# Patient Record
Sex: FEMALE | Race: BLACK OR AFRICAN AMERICAN | NOT HISPANIC OR LATINO | URBAN - METROPOLITAN AREA
[De-identification: names, ages, dates, MRNs, and addresses within clinical notes are randomized per-mention and may not be internally consistent; named-entity substitution may affect disease eponyms.]

---

## 2017-05-08 ENCOUNTER — OFFICE VISIT (OUTPATIENT)
Dept: INTERNAL MEDICINE | Facility: CLINIC | Age: 52
End: 2017-05-08
Payer: COMMERCIAL

## 2017-05-08 VITALS
DIASTOLIC BLOOD PRESSURE: 70 MMHG | HEART RATE: 76 BPM | WEIGHT: 186.5 LBS | HEIGHT: 64 IN | OXYGEN SATURATION: 99 % | SYSTOLIC BLOOD PRESSURE: 132 MMHG | BODY MASS INDEX: 31.84 KG/M2

## 2017-05-08 DIAGNOSIS — E78.2 MIXED HYPERLIPIDEMIA: ICD-10-CM

## 2017-05-08 DIAGNOSIS — Z00.00 VISIT FOR ANNUAL HEALTH EXAMINATION: Primary | ICD-10-CM

## 2017-05-08 DIAGNOSIS — I10 BENIGN ESSENTIAL HTN: ICD-10-CM

## 2017-05-08 DIAGNOSIS — M17.0 PRIMARY OSTEOARTHRITIS OF KNEES, BILATERAL: ICD-10-CM

## 2017-05-08 DIAGNOSIS — Z12.11 SCREEN FOR COLON CANCER: ICD-10-CM

## 2017-05-08 DIAGNOSIS — G56.03 BILATERAL CARPAL TUNNEL SYNDROME: ICD-10-CM

## 2017-05-08 PROCEDURE — 1160F RVW MEDS BY RX/DR IN RCRD: CPT | Mod: S$GLB,,, | Performed by: INTERNAL MEDICINE

## 2017-05-08 PROCEDURE — 99999 PR PBB SHADOW E&M-NEW PATIENT-LVL IV: CPT | Mod: PBBFAC,,, | Performed by: INTERNAL MEDICINE

## 2017-05-08 PROCEDURE — 3075F SYST BP GE 130 - 139MM HG: CPT | Mod: S$GLB,,, | Performed by: INTERNAL MEDICINE

## 2017-05-08 PROCEDURE — 99203 OFFICE O/P NEW LOW 30 MIN: CPT | Mod: S$GLB,,, | Performed by: INTERNAL MEDICINE

## 2017-05-08 PROCEDURE — 3078F DIAST BP <80 MM HG: CPT | Mod: S$GLB,,, | Performed by: INTERNAL MEDICINE

## 2017-05-08 RX ORDER — HYDROCHLOROTHIAZIDE 25 MG/1
25 TABLET ORAL DAILY
Qty: 90 TABLET | Refills: 2 | Status: SHIPPED | OUTPATIENT
Start: 2017-05-08 | End: 2018-06-19 | Stop reason: SDUPTHER

## 2017-05-08 RX ORDER — NORGESTIMATE AND ETHINYL ESTRADIOL 0.25-0.035
1 KIT ORAL DAILY
Qty: 30 TABLET | Refills: 0
Start: 2017-05-08 | End: 2018-07-31

## 2017-05-08 RX ORDER — HYDROCHLOROTHIAZIDE 25 MG/1
25 TABLET ORAL DAILY
Qty: 90 TABLET | Refills: 1
Start: 2017-05-08 | End: 2017-05-08 | Stop reason: SDUPTHER

## 2017-05-08 RX ORDER — FLUTICASONE PROPIONATE 50 MCG
1 SPRAY, SUSPENSION (ML) NASAL DAILY
Qty: 1 BOTTLE | Refills: 0
Start: 2017-05-08 | End: 2018-04-06 | Stop reason: SDUPTHER

## 2017-05-08 NOTE — MR AVS SNAPSHOT
[facility]  [address]  [facility] [address]  Phone: [phone]  Fax: [phone]                  Leah Minaya   2017 9:20 AM   Office Visit    Description:  Female : 1965   Provider:  Didi Bermudez MD   Department:  [facility]           Reason for Visit     Establish Care           Diagnoses this Visit        Comments    Visit for annual health examination    -  Primary     Benign essential HTN         Primary osteoarthritis of knees, bilateral         Bilateral carpal tunnel syndrome         Mixed hyperlipidemia         Screen for colon cancer                To Do List           Future Appointments        Provider Department Dept Phone    2017 10:20 AM LAB, APPOINTMENT [facility] [phone]      Goals (5 Years of Data)     None       These Medications        Disp Refills Start End    hydrochlorothiazide (HYDRODIURIL) 25 MG tablet 90 tablet 2 2017    Take 1 tablet (25 mg total) by mouth once daily. - Oral    Pharmacy: [facility] [address] [place] [address] #: [phone]         [facility]     [facility] Care Line -  Assistance  Unless otherwise directed by your provider, please contact [facility], our nurse care line that is available for  assistance.     Registered nurses in the [facility] provide: appointment scheduling, clinical advisement, health education, and other advisory services.  Call: [phone] (toll free)               Medications           Message regarding Medications     Verify the changes and/or additions to your medication regime listed below are the same as discussed with your clinician today.  If any of these changes or additions are incorrect, please notify your healthcare provider.             Verify that the below list of medications is an accurate representation of the medications you are  "currently taking.  If none reported, the list may be blank. If incorrect, please contact your healthcare provider. Carry this list with you in case of emergency.           Current Medications     fluticasone (FLONASE) 50 mcg/actuation nasal spray 1 spray by Each Nare route once daily.    hydrochlorothiazide (HYDRODIURIL) 25 MG tablet Take 1 tablet (25 mg total) by mouth once daily.    norgestimate-ethinyl estradiol (ORTHO-CYCLEN) 0.25-35 mg-mcg per tablet Take 1 tablet by mouth once daily.           Clinical Reference Information           Your Vitals Were     BP Pulse Height Weight SpO2 BMI    132/70 (BP Location: Right arm, Patient Position: Sitting) 76 5' 4" (1.626 m) 84.6 kg (186 lb 8.2 oz) 99% 32.01 kg/m2      Blood Pressure          Most Recent Value    BP  132/70      Allergies as of 5/8/2017     No Known Allergies      Immunizations Administered on Date of Encounter - 5/8/2017     None      Orders Placed During Today's Visit      Normal Orders This Visit    Ambulatory consult to Physical Therapy     Ambulatory referral to Gastroenterology     Future Labs/Procedures Expected by Expires    Comprehensive metabolic panel  5/8/2017 7/7/2018      [facility] Sign-Up     Activating your [facility] account is as easy as 1-2-3!     1) Visit [email or web address], select Sign Up Now, enter this activation code and your date of birth, then select Next.  [email or web address]  Expires: 6/22/2017  9:57 AM      2) Create a username and password to use when you visit [facility] in the future and select a security question in case you lose your password and select Next.    3) Enter your e-mail address and click Sign Up!    Additional Information  If you have questions, please e-mail [email or web address] or call [phone] to talk to our [facility] staff. Remember, [facility] is NOT to be used for urgent needs. For medical emergencies, dial 911.         Language Assistance Services     ATTENTION: Language assistance services are " available, free of charge. Please call [phone].      ATENCIÓN: Si habla español, tiene a hinkle disposición servicios gratuitos de asistencia lingüística. Llame al [phone].     CHÚ Ý: N?u b?n nói Ti?ng Vi?t, có các d?ch v? h? tr? ngôn ng? mi?n phí dành cho b?n. G?i s? [phone].         [facility] complies with applicable Federal civil rights laws and does not discriminate on the basis of race, color, national origin, age, disability, or sex.

## 2017-05-08 NOTE — PROGRESS NOTES
INTERNAL MEDICINE INITIAL VISIT NOTE      CHIEF COMPLAINT     Chief Complaint   Patient presents with    Establish Care       HPI     Leah Minaya is a 52 y.o. AA female who presents with a PMHx of:    Past Medical History:  Past Medical History:   Diagnosis Date    Benign essential HTN     Carpal tunnel syndrome on both sides     History of prediabetes     Mixed hyperlipidemia 5/8/2017    Primary osteoarthritis of knees, bilateral     Vitamin D deficiency      Here to re-establish care from [facility], last seen by me 1/2017.    Has hx B knee pain 2/2 OA.  Pt done in the past which helped and was referred back at last appt in Jan but says she did not get appt set up because [facility] did not have PT available.  Also sees Dr. Rizzo as needed, seen recently and got L knee injection (thinks synvisc?) which she says helped.      At last appt also c c/o of B hand numbness, history and exam c/w B carpal tunnel and pt was rec to use wrist splints.  Says sx have since resolved.    Also c hx anemia likely due to menses in the past but had resolved on last few labs and on OCP as per Gyn.  Has hx fibroids and was offered Mirena IUD but says she is still thinking about it.    Also c hx preDM which had normalized on last few checks c dietary modifications.    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  Also works part-time at [employer].    Past Surgical History:  History reviewed. No pertinent surgical history.    Home Medications:  Prior to Admission medications    Medication Sig Start Date End Date Taking? Authorizing Provider   fluticasone (FLONASE) 50 mcg/actuation nasal spray 1 spray by Each Nare route once daily. 5/8/17   Didi Bermudez MD   hydrochlorothiazide (HYDRODIURIL) 25 MG tablet Take 1 tablet (25 mg total) by mouth once daily. 5/8/17 5/8/18  Didi Bermudez MD   norgestimate-ethinyl estradiol (ORTHO-CYCLEN) 0.25-35 mg-mcg per tablet Take 1 tablet by mouth once daily. 5/8/17 5/8/18  Didi Bermudez MD       Allergies:  Review  "of patient's allergies indicates:  No Known Allergies    Family History:  Family History   Problem Relation Age of Onset    Breast cancer Mother      dx at 52    Hepatitis Father      autoimmune    Hypertension Father     Alcohol abuse Brother     Hypertension Brother        Social History:  Social History   Substance Use Topics    Smoking status: Never Smoker    Smokeless tobacco: None    Alcohol use No       Review of Systems:  Review of Systems   Constitutional: Negative for appetite change, chills, fatigue, fever and unexpected weight change.   HENT: Negative for congestion, hearing loss and rhinorrhea.    Eyes: Negative for pain and visual disturbance.   Respiratory: Negative for cough, chest tightness, shortness of breath and wheezing.    Cardiovascular: Negative for chest pain, palpitations and leg swelling.   Gastrointestinal: Negative for abdominal distention and abdominal pain.   Endocrine: Negative for polydipsia and polyuria.   Genitourinary: Negative for decreased urine volume, difficulty urinating, dysuria, hematuria and vaginal discharge.   Musculoskeletal: Positive for arthralgias (B knees as per HPI).   Neurological: Negative for weakness, numbness (B hands as per HPI) and headaches.   Psychiatric/Behavioral: Negative for behavioral problems and confusion.       Health Maintenance:   Immunizations:   Influenza is up to date 1/2017  TDap is up to date 8/2012    Cancer Screening:  PAP: is up to date. 12/2016 neg c neg HPV, EMB 12/2016 benign (Dr. Bucio)  Mammogram: is up to date. 9/2016 benign at [facility]  Colonoscopy: needed since now 52, will refer to Dr Mccain since pt has [facility] insurance.        PHYSICAL EXAM     /70 (BP Location: Right arm, Patient Position: Sitting)  Pulse 76  Ht 5' 4" (1.626 m)  Wt 84.6 kg (186 lb 8.2 oz)  SpO2 99%  BMI 32.01 kg/m2    GEN - A+OX4, NAD   HEENT - PERRL, EOMI, OP clear  Neck - No thyromegaly or cervical LAD. No thyroid masses felt.  CV - RRR, no " m/r/g  Chest - CTAB, no wheezing, crackles, or rhonchi  Abd - S/NT/ND/+BS.   Ext - 2+BDP and radial pulses. No C/C/E.  Knees--no noted swelling, Crepitus c ROM B.  Neuro - 5/5 BUE and BLE strength.  LN - No LAD appreciated.  Skin - Normal color and texture, no rash, no skin lesions.      LABS     From [facility] in Jan, to be scanned into Epic.    ASSESSMENT/PLAN     Leah Minaya is a 52 y.o. female with  Leah was seen today for establish care.    Diagnoses and all orders for this visit:    Visit for annual health examination       - History and physical exam completed.  Health maintenance reviewed as above.    Benign essential HTN  -     at goal.  Cont current medications.  - Refills for HCTZ sent.  - Comprehensive metabolic panel; Future; Expected date: 5/8/17    Primary osteoarthritis of knees, bilateral  -     Sees Dr. Rizzo at [facility].  Improved c recent knee injection.  Requesting PT referral, will place to [facility] based on insurance.  - Ambulatory consult to Physical Therapy    Bilateral carpal tunnel syndrome        - Resolved, no acute issues,  Not req use of splints.    Mixed hyperlipidemia       -  last year but improved to 128 in Jan 2017 labs at [facility], now at goal of <130, not req meds.       - Patient counseled on need for a low fat diet.  Avoid red meats and fried foods as well as cream-based foods and processed foods.  Recommend weight loss with diet and exercise.       - Can repeat labs at f/u.    Hx anemia       - Resolved on last few checks.  Still c menses q month which pt reports is heavy at times, on OCP as per Gyn, considering IUD.  Pap utd and benign, EMB benign 2016, ref for age-appropriate screening csc now.    Hx Vit D def       - Last check suboptimal at 25, advised to take D3 2000 IU otc as prev recommended.    Screen for colon cancer  -     Ambulatory referral to Gastroenterology    HM as above    RTC in 6 months, sooner if needed and depending on labs, cmp today.    Didi Bermudez,  MD  Department of Internal Medicine - [facility]  05/08/2017  9:27 AM

## 2018-04-06 ENCOUNTER — OFFICE VISIT (OUTPATIENT)
Dept: INTERNAL MEDICINE | Facility: CLINIC | Age: 53
End: 2018-04-06
Payer: COMMERCIAL

## 2018-04-06 VITALS
WEIGHT: 191.38 LBS | HEIGHT: 64 IN | OXYGEN SATURATION: 99 % | DIASTOLIC BLOOD PRESSURE: 90 MMHG | TEMPERATURE: 99 F | HEART RATE: 70 BPM | SYSTOLIC BLOOD PRESSURE: 161 MMHG | BODY MASS INDEX: 32.67 KG/M2

## 2018-04-06 DIAGNOSIS — J20.9 ACUTE BRONCHITIS AND BRONCHIOLITIS: Primary | ICD-10-CM

## 2018-04-06 DIAGNOSIS — J21.9 ACUTE BRONCHITIS AND BRONCHIOLITIS: Primary | ICD-10-CM

## 2018-04-06 PROCEDURE — 99214 OFFICE O/P EST MOD 30 MIN: CPT | Mod: S$GLB,,, | Performed by: INTERNAL MEDICINE

## 2018-04-06 PROCEDURE — 99999 PR PBB SHADOW E&M-EST. PATIENT-LVL IV: CPT | Mod: PBBFAC,,, | Performed by: INTERNAL MEDICINE

## 2018-04-06 RX ORDER — ALBUTEROL SULFATE 90 UG/1
2 AEROSOL, METERED RESPIRATORY (INHALATION) EVERY 4 HOURS PRN
Qty: 1 INHALER | Refills: 0 | Status: SHIPPED | OUTPATIENT
Start: 2018-04-06 | End: 2023-03-28

## 2018-04-06 RX ORDER — CETIRIZINE HYDROCHLORIDE 10 MG/1
10 TABLET ORAL DAILY
Qty: 30 TABLET | Refills: 0 | Status: SHIPPED | OUTPATIENT
Start: 2018-04-06 | End: 2023-03-28

## 2018-04-06 RX ORDER — FLUTICASONE PROPIONATE 50 MCG
1 SPRAY, SUSPENSION (ML) NASAL DAILY
Qty: 1 BOTTLE | Refills: 11
Start: 2018-04-06 | End: 2018-04-06 | Stop reason: SDUPTHER

## 2018-04-06 RX ORDER — FLUTICASONE PROPIONATE 50 MCG
1 SPRAY, SUSPENSION (ML) NASAL DAILY
Qty: 1 BOTTLE | Refills: 11 | Status: SHIPPED | OUTPATIENT
Start: 2018-04-06

## 2018-04-06 RX ORDER — BENZONATATE 100 MG/1
100 CAPSULE ORAL 3 TIMES DAILY PRN
Qty: 30 CAPSULE | Refills: 0 | Status: SHIPPED | OUTPATIENT
Start: 2018-04-06 | End: 2018-05-06

## 2018-04-06 RX ORDER — DEXAMETHASONE 4 MG/1
4 TABLET ORAL EVERY 12 HOURS
Qty: 10 TABLET | Refills: 0 | Status: SHIPPED | OUTPATIENT
Start: 2018-04-06 | End: 2018-04-11

## 2018-04-06 RX ORDER — AZITHROMYCIN 500 MG/1
500 TABLET, FILM COATED ORAL DAILY
Qty: 7 TABLET | Refills: 0 | Status: SHIPPED | OUTPATIENT
Start: 2018-04-06 | End: 2018-07-31

## 2018-04-06 NOTE — PROGRESS NOTES
INTERNAL MEDICINE CLINIC - SAME DAY APPOINTMENT  Progress Note    PRESENTING HISTORY     PCP: Didi Bermudez MD  Chief Complaint/Reason for Visit:     Chief Complaint   Patient presents with    Nasal Congestion     History of Present Illness & ROS : Ms. Leah Minaya is a 53 y.o. female.      She complains of nasal congestion on Saturday.  She felt postnasal drip. She was gagging.  She took Tylenol sinus. Did not work.  Sunday, running nose.    She took Dimetapp.  By Tuesday, she was coughing up phlegm yellow.  Took Mucinex.    Now she is feeling rattling in her chest with cough.    No fever. Possible chill on Tuesday.    Chest pain with coughing.    PAST HISTORY:     Past Medical History:   Diagnosis Date    Benign essential HTN     Bilateral carpal tunnel syndrome 5/8/2017    History of prediabetes     Mixed hyperlipidemia 5/8/2017    Primary osteoarthritis of knees, bilateral     Vitamin D deficiency        No past surgical history on file.    Family History   Problem Relation Age of Onset    Breast cancer Mother      dx at 52    Hepatitis Father      autoimmune    Hypertension Father     Alcohol abuse Brother     Hypertension Brother        Social History     Social History    Marital status: Single     Spouse name: N/A    Number of children: N/A    Years of education: N/A     Social History Main Topics    Smoking status: Never Smoker    Smokeless tobacco: None    Alcohol use No    Drug use: No    Sexual activity: Not Asked     Other Topics Concern    None     Social History Narrative    Works in [employer]'s Office at [employer], also works at [employer] part-time.       MEDICATIONS & ALLERGIES:     Current Outpatient Prescriptions on File Prior to Visit   Medication Sig Dispense Refill    hydrochlorothiazide (HYDRODIURIL) 25 MG tablet Take 1 tablet (25 mg total) by mouth once daily. 90 tablet 2    norgestimate-ethinyl estradiol (ORTHO-CYCLEN) 0.25-35 mg-mcg per tablet Take 1 tablet by mouth  once daily. 30 tablet 0     fluticasone (FLONASE) 50 mcg/actuation nasal spray 1 spray by Each Nare route once daily. 1 Bottle 0     No current facility-administered medications on file prior to visit.         Review of patient's allergies indicates:  No Known Allergies    Medications Reconciliation:   I have reconciled the patient's home medications with the patient/family. I have updated all changes.  Refer to After-Visit Medication List.    OBJECTIVE:     Vital Signs:  Vitals:    04/06/18 1525   BP: (!) 161/90   Pulse: 70   Temp: 98.6 °F (37 °C)     Wt Readings from Last 1 Encounters:   04/06/18 1525 86.8 kg (191 lb 5.8 oz)     Body mass index is 32.85 kg/m².     Physical Exam:  General: Well developed, well nourished. No distress.  HEENT: Head is normocephalic, atraumatic.  Ears: both external ears are normal.  TMs are normal bilaterally.  Nasal turbinates - mildly swollen mucosa.  Pharynx - normal.  Sinus - normal.  Eyes: Clear conjunctiva bilaterally.  Neck: Supple, symmetrical neck; trachea midline.  Lymph Nodes: No cervical or supraclavicular adenopathy.  Lungs: Decreased breath sounds diffusely with mild expiratory wheezing.  Cardiovascular: Heart with regular rate and rhythm.      Laboratory  Lab Results   Component Value Date    WBC 4.00 (L) 05/11/2011    HGB 11.6 (L) 05/11/2011    HCT 36.8 (L) 05/11/2011     05/11/2011    CHOL 199 05/11/2011    TRIG 53 05/11/2011    HDL 54 05/11/2011    ALT 15 05/08/2017    AST 21 05/08/2017     05/08/2017    K 4.0 05/08/2017     05/08/2017    CREATININE 0.9 05/08/2017    BUN 12 05/08/2017    CO2 25 05/08/2017    TSH 1.93 05/11/2011       ASSESSMENT & PLAN:     Acute bronchitis and bronchiolitis  - After 1 week of viral sinusitis.  Now with acute bronchitis with wheezing.  Plan:  -     albuterol 90 mcg/actuation inhaler; Inhale 2 puffs into the lungs every 4 (four) hours as needed for Wheezing or Shortness of Breath.  Dispense: 1 Inhaler; Refill: 0  -      azithromycin (ZITHROMAX) 500 MG tablet; Take 1 tablet (500 mg total) by mouth once daily.  Dispense: 7 tablet; Refill: 0  -     benzonatate (TESSALON) 100 MG capsule; Take 1 capsule (100 mg total) by mouth 3 (three) times daily as needed for Cough.  Dispense: 30 capsule; Refill: 0  -     dexamethasone (DECADRON) 4 MG Tab; Take 1 tablet (4 mg total) by mouth every 12 (twelve) hours.  Dispense: 10 tablet; Refill: 0  -     cetirizine (ZYRTEC) 10 MG tablet; Take 1 tablet (10 mg total) by mouth once daily.  Dispense: 30 tablet; Refill: 0  -     fluticasone (FLONASE) 50 mcg/actuation nasal spray; 1 spray (50 mcg total) by Each Nare route once daily.  Dispense: 1 Bottle; Refill: 11    Instructions for the patient:    Acute Bronchitis:    1. Take Azithromycin 500 m tablet daily for 7 days.  2. Take Decadron 4 m tablet twice daily for 5 days.  3. Take Tessalon Perle:  1 tablet every 6 hours as needed for cough. It is non-drowsy.  4. Take Zyrtec 10 m tablet daily for 5 days. Afterward you can take it daily as needed for allergy/nasal congestion.  5. Take Albuterol 2 puff every 4 hours while awake until cough is resolved.      Take yogurt (Activia, Greek, Slovenian or Kefir) 1 serving twice daily for 10 days to prevent antibiotic associated diarrhea or yeast infection.    Scheduled Follow-up :  No future appointments.    After Visit Medication List :     Medication List          Accurate as of 18  3:45 PM. If you have any questions, ask your nurse or doctor.               START taking these medications    albuterol 90 mcg/actuation inhaler  Inhale 2 puffs into the lungs every 4 (four) hours as needed for Wheezing or Shortness of Breath.  Started by:  Jarrod Harman MD     azithromycin 500 MG tablet  Commonly known as:  ZITHROMAX  Take 1 tablet (500 mg total) by mouth once daily.  Started by:  Jarrod Harman MD     benzonatate 100 MG capsule  Commonly known as:  TESSALON  Take 1 capsule (100 mg total) by mouth  3 (three) times daily as needed for Cough.  Started by:  Jarrod Harman MD     cetirizine 10 MG tablet  Commonly known as:  ZYRTEC  Take 1 tablet (10 mg total) by mouth once daily.  Started by:  Jarrod Harman MD     dexamethasone 4 MG Tab  Commonly known as:  DECADRON  Take 1 tablet (4 mg total) by mouth every 12 (twelve) hours.  Started by:  Jarrod Harman MD        CONTINUE taking these medications    fluticasone 50 mcg/actuation nasal spray  Commonly known as:  FLONASE  1 spray (50 mcg total) by Each Nare route once daily.     hydroCHLOROthiazide 25 MG tablet  Commonly known as:  HYDRODIURIL  Take 1 tablet (25 mg total) by mouth once daily.     norgestimate-ethinyl estradiol 0.25-35 mg-mcg per tablet  Commonly known as:  ORTHO-CYCLEN  Take 1 tablet by mouth once daily.           Where to Get Your Medications      These medications were sent to [facility] [address] [place] [id number]    Phone:  [phone]   · albuterol 90 mcg/actuation inhaler  · azithromycin 500 MG tablet  · benzonatate 100 MG capsule  · cetirizine 10 MG tablet  · dexamethasone 4 MG Tab  · fluticasone 50 mcg/actuation nasal spray         Signing Physician:  Jarrod Harman MD

## 2018-04-06 NOTE — PATIENT INSTRUCTIONS
Acute Bronchitis:    1. Take Azithromycin 500 m tablet daily for 7 days.  2. Take Decadron 4 m tablet twice daily for 5 days.  3. Take Tessalon Perle:  1 tablet every 6 hours as needed for cough. It is non-drowsy.  4. Take Zyrtec 10 m tablet daily for 5 days. Afterward you can take it daily as needed for allergy/nasal congestion.  5. Take Albuterol 2 puff every 4 hours while awake until cough is resolved.      Take yogurt (Activia, Greek, Amharic or Kefir) 1 serving twice daily for 10 days to prevent antibiotic associated diarrhea or yeast infection.

## 2018-05-11 DIAGNOSIS — Z12.11 COLON CANCER SCREENING: ICD-10-CM

## 2018-05-18 DIAGNOSIS — Z12.11 COLON CANCER SCREENING: ICD-10-CM

## 2018-06-19 RX ORDER — HYDROCHLOROTHIAZIDE 25 MG/1
25 TABLET ORAL DAILY
Qty: 30 TABLET | Refills: 0 | Status: SHIPPED | OUTPATIENT
Start: 2018-06-19 | End: 2018-07-31 | Stop reason: SDUPTHER

## 2018-06-19 NOTE — TELEPHONE ENCOUNTER
LVM for pt informing that HCTZ was sent to pharmacy, 1 month supply. Be sure to keep appt next month on 7/31

## 2018-06-19 NOTE — TELEPHONE ENCOUNTER
"----- Message from Talita Vincent sent at 2018 11:47 AM CDT -----  Contact: [phone]  RX request - refill or new RX.  Is this a refill or new RX:  New  RX name and strength: hydrochlorothiazide (HYDRODIURIL) 25 MG tablet ()  Directions:   Is this a 30 day or 90 day RX:    Local pharmacy or mail order pharmacy:    Pharmacy name and phone # (DON'T enter "on file" or "in chart"): [facility] [address] [phone] (Phone)  [phone] (Fax)      Comments:        "

## 2018-07-30 PROBLEM — R73.03 PREDIABETES: Status: ACTIVE | Noted: 2018-07-30

## 2018-07-30 NOTE — PROGRESS NOTES
"INTERNAL MEDICINE ESTABLISHED PATIENT VISIT NOTE    Subjective:     Chief Complaint: Annual Exam       Patient ID: Leah Minaya is a 53 y.o. female with HTN, preDM, HLD, B carpal tunnel, OA B knees, Vit D def, last seen by me 5/2017, here today for annual exam and f/u.    Also c/o rash on her back that started last week.  No new lotions.    Has been feeling more tired than usual.  Sometimes snores when she is very tired.    Also reports a "weird feeling" in her chest when bending over at times and requesting echo.  Stays it was a sharp sensation.  No exertional chest pain.  Sx L sided and under her L arm.    Past Medical History:  Past Medical History:   Diagnosis Date    Benign essential HTN     Bilateral carpal tunnel syndrome 5/8/2017    History of prediabetes     Mixed hyperlipidemia 5/8/2017    Primary osteoarthritis of knees, bilateral     Vitamin D deficiency        Home Medications:  Prior to Admission medications    Medication Sig Start Date End Date Taking? Authorizing Provider   albuterol 90 mcg/actuation inhaler Inhale 2 puffs into the lungs every 4 (four) hours as needed for Wheezing or Shortness of Breath. 4/6/18 5/6/18  Jarrod Harman MD   azithromycin (ZITHROMAX) 500 MG tablet Take 1 tablet (500 mg total) by mouth once daily. 4/6/18   Jarrod Harman MD   cetirizine (ZYRTEC) 10 MG tablet Take 1 tablet (10 mg total) by mouth once daily. 4/6/18 5/6/18  Jarrod Harman MD   fluticasone (FLONASE) 50 mcg/actuation nasal spray 1 spray (50 mcg total) by Each Nare route once daily. 4/6/18   Jarrod Harman MD   hydroCHLOROthiazide (HYDRODIURIL) 25 MG tablet Take 1 tablet (25 mg total) by mouth once daily. 6/19/18 6/19/19  Neris Herrera NP   norgestimate-ethinyl estradiol (ORTHO-CYCLEN) 0.25-35 mg-mcg per tablet Take 1 tablet by mouth once daily. 5/8/17 5/8/18  Didi Bermudez MD       Allergies:  Review of patient's allergies indicates:  No Known Allergies    Social History:  Social History   Substance " "Use Topics    Smoking status: Never Smoker    Smokeless tobacco: Not on file    Alcohol use No        Review of Systems   Constitutional: Negative for appetite change, chills, fatigue, fever and unexpected weight change.   HENT: Negative for congestion, hearing loss and rhinorrhea.    Eyes: Negative for pain and visual disturbance.   Respiratory: Negative for cough, chest tightness, shortness of breath and wheezing.    Cardiovascular: Negative for chest pain, palpitations and leg swelling.   Gastrointestinal: Negative for abdominal distention and abdominal pain.   Endocrine: Negative for polydipsia and polyuria.   Genitourinary: Negative for decreased urine volume, difficulty urinating, dysuria, hematuria and vaginal discharge.   Neurological: Negative for weakness, numbness and headaches.   Psychiatric/Behavioral: Negative for behavioral problems and confusion.         Health Maintenance:   Immunizations:   Influenza is up to date 1/2017  TDap is up to date 8/2012     Cancer Screening:  PAP: is up to date. 12/2016 neg c neg HPV, EMB 12/2016 benign (Dr. Bucio), has f/u 8/17/18  Mammogram: 7/30/18 at [facility], awaiting results.  Colonoscopy: needed since now 52, pt to get at [facility]    Objective:   BP (!) 142/90 (BP Location: Left arm, Patient Position: Sitting, BP Method: Medium (Manual))   Pulse 76   Ht 5' 5" (1.651 m)   Wt 86.6 kg (190 lb 14.7 oz)   SpO2 99%   BMI 31.77 kg/m²        General: AAO x3, no apparent distress  CV: RRR, no m/r/g  Pulm: Lungs CTAB, no crackles, no wheezes  Abd: s/NT/ND +BS  Extremities: no c/c/e  Skin: small patch of hyperpigmentation c slight scaling on R upper back near base of neck    Labs:     Lab Results   Component Value Date    WBC 4.00 (L) 05/11/2011    HGB 11.6 (L) 05/11/2011    HCT 36.8 (L) 05/11/2011    MCV 87.8 05/11/2011     05/11/2011     CMP  Sodium   Date Value Ref Range Status   05/08/2017 140 136 - 145 mmol/L Final     Potassium   Date Value Ref Range Status "   05/08/2017 4.0 3.5 - 5.1 mmol/L Final     Chloride   Date Value Ref Range Status   05/08/2017 105 95 - 110 mmol/L Final     CO2   Date Value Ref Range Status   05/08/2017 25 23 - 29 mmol/L Final     Glucose   Date Value Ref Range Status   05/08/2017 81 70 - 110 mg/dL Final     BUN, Bld   Date Value Ref Range Status   05/08/2017 12 6 - 20 mg/dL Final     Creatinine   Date Value Ref Range Status   05/08/2017 0.9 0.5 - 1.4 mg/dL Final     Calcium   Date Value Ref Range Status   05/08/2017 9.7 8.7 - 10.5 mg/dL Final     Total Protein   Date Value Ref Range Status   05/08/2017 8.0 6.0 - 8.4 g/dL Final     Albumin   Date Value Ref Range Status   05/08/2017 3.7 3.5 - 5.2 g/dL Final     Total Bilirubin   Date Value Ref Range Status   05/08/2017 0.5 0.1 - 1.0 mg/dL Final     Comment:     For infants and newborns, interpretation of results should be based  on gestational age, weight and in agreement with clinical  observations.  Premature Infant recommended reference ranges:  Up to 24 hours.............<8.0 mg/dL  Up to 48 hours............<12.0 mg/dL  3-5 days..................<15.0 mg/dL  6-29 days.................<15.0 mg/dL       Alkaline Phosphatase   Date Value Ref Range Status   05/08/2017 60 55 - 135 U/L Final     AST   Date Value Ref Range Status   05/08/2017 21 10 - 40 U/L Final     ALT   Date Value Ref Range Status   05/08/2017 15 10 - 44 U/L Final     Anion Gap   Date Value Ref Range Status   05/08/2017 10 8 - 16 mmol/L Final     eGFR if    Date Value Ref Range Status   05/08/2017 >60.0 >60 mL/min/1.73 m^2 Final     eGFR if non    Date Value Ref Range Status   05/08/2017 >60.0 >60 mL/min/1.73 m^2 Final     Comment:     Calculation used to obtain the estimated glomerular filtration  rate (eGFR) is the CKD-EPI equation. Since race is unknown   in our information system, the eGFR values for   -American and Non--American patients are given   for each creatinine result.        Lab Results   Component Value Date    LDLCALC 134.4 05/11/2011     No results found for: LABA1C, HGBA1C  Lab Results   Component Value Date    TSH 1.93 05/11/2011         Assessment/Plan     Leah was seen today for annual exam.    Diagnoses and all orders for this visit:    Visit for annual health examination  History and physical exam completed.  Health maintenance reviewed as above.    Benign essential HTN  Just above goal but has not been taking meds.  Will restart HCTZ now.  rec bp check in one month.  -     hydroCHLOROthiazide (HYDRODIURIL) 25 MG tablet; Take 1 tablet (25 mg total) by mouth once daily.  -     Comprehensive metabolic panel; Future  -     URINALYSIS; Future  -     Comprehensive metabolic panel  -     URINALYSIS    Mixed hyperlipidemia   1/2017, due for labs now, will check via [facility] based on pt's insurance  -     Lipid panel; Future  -     Lipid panel    Prediabetes  No recent labs  Will check now  -     Hemoglobin A1c; Future  -     Hemoglobin A1c    Bilateral carpal tunnel syndrome  No acute issues, use wrist splint prn    Primary osteoarthritis of knees, bilateral  Chronic, sees Dr. Rizzo, cont f/u as needed    Screening for thyroid disorder  -     TSH; Future  -     TSH    Rosacea  Sees derm, refills and mgmt as per Derm  -     mometasone 0.1% (ELOCON) 0.1 % cream; Apply topically once daily. for 10 days  -     hydroquinone 2 % Crea; Apply topically as needed.  -     clindamycin phosphate 1% (CLINDAGEL) 1 % gel; Apply topically 2 (two) times daily.    Anemia, unspecified type  Noted on chart review, will repeat c fe studies  -     Iron and TIBC; Future  -     Ferritin; Future    Vitamin D deficiency  rec D3 2000 daily for now  Will check levels  -     Vitamin D; Future    Chest discomfort  Sx atypical, appears likely related to gas  -     2D Echo w/ Color Flow Doppler; Future    Fatigue, unspecified type  Will check all basic labs as above and consider sleep study if labs  unremarkable.      HM as above  RTC in 1 mo for f/u BP, advised pt to call me 3 days after labs and echo for results from [facility], labs to be done today, pt to call Cardiology lab at [facility] to schedule echo.    Didi Bermudez MD  Department of Internal Medicine - [facility]  07/31/2018

## 2018-07-31 ENCOUNTER — OFFICE VISIT (OUTPATIENT)
Dept: INTERNAL MEDICINE | Facility: CLINIC | Age: 53
End: 2018-07-31
Payer: COMMERCIAL

## 2018-07-31 VITALS
OXYGEN SATURATION: 99 % | HEIGHT: 65 IN | HEART RATE: 76 BPM | BODY MASS INDEX: 31.81 KG/M2 | SYSTOLIC BLOOD PRESSURE: 142 MMHG | DIASTOLIC BLOOD PRESSURE: 90 MMHG | WEIGHT: 190.94 LBS

## 2018-07-31 DIAGNOSIS — Z13.29 SCREENING FOR THYROID DISORDER: ICD-10-CM

## 2018-07-31 DIAGNOSIS — I10 BENIGN ESSENTIAL HTN: ICD-10-CM

## 2018-07-31 DIAGNOSIS — Z13.0 SCREENING, ANEMIA, DEFICIENCY, IRON: ICD-10-CM

## 2018-07-31 DIAGNOSIS — D64.9 ANEMIA, UNSPECIFIED TYPE: ICD-10-CM

## 2018-07-31 DIAGNOSIS — R73.03 PREDIABETES: ICD-10-CM

## 2018-07-31 DIAGNOSIS — R53.83 FATIGUE, UNSPECIFIED TYPE: ICD-10-CM

## 2018-07-31 DIAGNOSIS — L71.9 ROSACEA: ICD-10-CM

## 2018-07-31 DIAGNOSIS — M17.0 PRIMARY OSTEOARTHRITIS OF KNEES, BILATERAL: ICD-10-CM

## 2018-07-31 DIAGNOSIS — R07.89 CHEST DISCOMFORT: ICD-10-CM

## 2018-07-31 DIAGNOSIS — E55.9 VITAMIN D DEFICIENCY: ICD-10-CM

## 2018-07-31 DIAGNOSIS — E78.2 MIXED HYPERLIPIDEMIA: ICD-10-CM

## 2018-07-31 DIAGNOSIS — G56.03 BILATERAL CARPAL TUNNEL SYNDROME: ICD-10-CM

## 2018-07-31 DIAGNOSIS — Z00.00 VISIT FOR ANNUAL HEALTH EXAMINATION: Primary | ICD-10-CM

## 2018-07-31 PROCEDURE — 99999 PR PBB SHADOW E&M-EST. PATIENT-LVL III: CPT | Mod: PBBFAC,,, | Performed by: INTERNAL MEDICINE

## 2018-07-31 PROCEDURE — 99396 PREV VISIT EST AGE 40-64: CPT | Mod: S$GLB,,, | Performed by: INTERNAL MEDICINE

## 2018-07-31 RX ORDER — MOMETASONE FUROATE 1 MG/G
CREAM TOPICAL DAILY
Qty: 45 G | Refills: 0
Start: 2018-07-31 | End: 2023-03-28

## 2018-07-31 RX ORDER — CLINDAMYCIN PHOSPHATE 10 MG/G
GEL TOPICAL 2 TIMES DAILY
Qty: 30 G | Refills: 0
Start: 2018-07-31

## 2018-07-31 RX ORDER — HYDROCHLOROTHIAZIDE 25 MG/1
25 TABLET ORAL DAILY
Qty: 90 TABLET | Refills: 2 | Status: SHIPPED | OUTPATIENT
Start: 2018-07-31 | End: 2019-08-19 | Stop reason: SDUPTHER

## 2018-07-31 RX ORDER — METRONIDAZOLE 10 MG/G
GEL TOPICAL DAILY
COMMUNITY

## 2018-08-16 ENCOUNTER — TELEPHONE (OUTPATIENT)
Dept: INTERNAL MEDICINE | Facility: CLINIC | Age: 53
End: 2018-08-16

## 2018-08-16 NOTE — TELEPHONE ENCOUNTER
Attempted to call pt, no answer, VM full.   Labs from [facility] reviewed.  Fe a little low but h/h wnl.  A1C now normalized at 5.5.  LDL at goal and stable at 129.  TSH wnl.  CMP wnl.  Wbc marginal at 4.1, will monitor.

## 2018-09-11 ENCOUNTER — OFFICE VISIT (OUTPATIENT)
Dept: URGENT CARE | Facility: CLINIC | Age: 53
End: 2018-09-11
Payer: COMMERCIAL

## 2018-09-11 VITALS
TEMPERATURE: 99 F | SYSTOLIC BLOOD PRESSURE: 155 MMHG | RESPIRATION RATE: 16 BRPM | BODY MASS INDEX: 31.65 KG/M2 | WEIGHT: 190 LBS | DIASTOLIC BLOOD PRESSURE: 80 MMHG | HEIGHT: 65 IN | OXYGEN SATURATION: 100 % | HEART RATE: 70 BPM

## 2018-09-11 DIAGNOSIS — N89.8 VAGINAL DISCHARGE: Primary | ICD-10-CM

## 2018-09-11 DIAGNOSIS — N94.9 VAGINAL DISCOMFORT: ICD-10-CM

## 2018-09-11 LAB
BILIRUB UR QL STRIP: NEGATIVE
GLUCOSE UR QL STRIP: NEGATIVE
KETONES UR QL STRIP: NEGATIVE
LEUKOCYTE ESTERASE UR QL STRIP: POSITIVE
PH, POC UA: 5.5 (ref 5–8)
POC BLOOD, URINE: NEGATIVE
POC NITRATES, URINE: NEGATIVE
PROT UR QL STRIP: NEGATIVE
SP GR UR STRIP: 1.02 (ref 1–1.03)
UROBILINOGEN UR STRIP-ACNC: ABNORMAL (ref 0.1–1.1)

## 2018-09-11 PROCEDURE — 87491 CHLMYD TRACH DNA AMP PROBE: CPT

## 2018-09-11 PROCEDURE — 87660 TRICHOMONAS VAGIN DIR PROBE: CPT

## 2018-09-11 PROCEDURE — 99214 OFFICE O/P EST MOD 30 MIN: CPT | Mod: 25,S$GLB,, | Performed by: NURSE PRACTITIONER

## 2018-09-11 PROCEDURE — 81003 URINALYSIS AUTO W/O SCOPE: CPT | Mod: QW,S$GLB,, | Performed by: NURSE PRACTITIONER

## 2018-09-11 RX ORDER — FLUCONAZOLE 150 MG/1
150 TABLET ORAL DAILY
Qty: 1 TABLET | Refills: 0 | Status: SHIPPED | OUTPATIENT
Start: 2018-09-11 | End: 2018-09-13 | Stop reason: SDUPTHER

## 2018-09-11 NOTE — PROGRESS NOTES
"Subjective:       Patient ID: Leah Minaya is a 53 y.o. female.    Vitals:  height is 5' 5" (1.651 m) and weight is 86.2 kg (190 lb). Her temperature is 98.8 °F (37.1 °C). Her blood pressure is 155/80 (abnormal) and her pulse is 70. Her respiration is 16 and oxygen saturation is 100%.     Chief Complaint: Vaginal Itching    The patient presents to the clinic today with complaints of vaginal discharge, and vaginal irritation over the last few days.  She has a history of DVT as well as yeast issues in the past.  She is sexually active.  Her partner has not had any complaints.  She denies any urinary symptoms.  She is sexually active with the same partner.  She is mildly concerned for STDs today is requesting testing for gonorrhea Chlamydia.  Denies any dysuria, urgency, or frequency.  The patient denies any fever, chills, or body aches.  She describes the discharge is a white clumpy discharge with no odor.  She denies any active lesions to her vaginal region.      Vaginal Itching   The patient's primary symptoms include genital itching. The patient's pertinent negatives include no missed menses. This is a new problem. The current episode started in the past 7 days. The problem occurs constantly. The problem has been unchanged. The patient is experiencing no pain. The problem affects both sides. She is not pregnant. Pertinent negatives include no abdominal pain, back pain, chills, dysuria, fever, hematuria, nausea, urgency or vomiting. She has tried antifungals for the symptoms. The treatment provided no relief. She is sexually active. It is unknown whether or not her partner has an STD. She uses nothing for contraception. Her menstrual history has been regular.     Review of Systems   Constitution: Negative for chills and fever.   Skin: Positive for itching.   Musculoskeletal: Negative for back pain.   Gastrointestinal: Negative for abdominal pain, nausea and vomiting.   Genitourinary: Negative for dysuria, genital " sores, hematuria, missed menses, non-menstrual bleeding and urgency.       Objective:      Physical Exam   Constitutional: She is oriented to person, place, and time. She appears well-developed and well-nourished.   HENT:   Head: Normocephalic and atraumatic.   Right Ear: External ear normal.   Left Ear: External ear normal.   Nose: Nose normal.   Eyes: Lids are normal.   Neck: Trachea normal, normal range of motion and phonation normal. Neck supple.   Cardiovascular: Normal pulses.   Pulmonary/Chest: Effort normal.   Abdominal: Soft. Normal appearance and bowel sounds are normal. She exhibits no distension. There is no tenderness. There is no CVA tenderness.   Genitourinary: There is tenderness on the right labia. There is tenderness on the left labia. Right adnexum displays no mass, no tenderness and no fullness. Left adnexum displays no mass, no tenderness and no fullness. There is erythema in the vagina. Vaginal discharge (white/yellowish discharge on exam. no odor present. ) found.   Genitourinary Comments: Patient tolerated the exam well. JASPER Boyce present during exam. Mild irritation to labia present. No lesions present.    Neurological: She is alert and oriented to person, place, and time.   Skin: Skin is warm, dry and intact.   Psychiatric: She has a normal mood and affect. Her speech is normal and behavior is normal. Cognition and memory are normal.   Nursing note and vitals reviewed.        Results for orders placed or performed in visit on 09/11/18   POCT Urinalysis, Dipstick, Automated, W/O Scope   Result Value Ref Range    POC Blood, Urine Negative Negative    POC Bilirubin, Urine Negative Negative    POC Urobilinogen, Urine norm 0.1 - 1.1    POC Ketones, Urine Negative Negative    POC Protein, Urine Negative Negative    POC Nitrates, Urine Negative Negative    POC Glucose, Urine Negative Negative    pH, UA 5.5 5 - 8    POC Specific Gravity, Urine 1.025 1.003 - 1.029    POC Leukocytes, Urine Positive  (A) Negative       Assessment:       1. Vaginal discharge    2. Vaginal discomfort        Plan:       MDM:    Vaginosis, gonorrhea, and chlamydia swab collected today during the pelvic exam.  Swab will be sent off to the lab and patient will be contacted with results.  Based on exam and history patient is not concerned for STDs will hold off on treatment until results return patient is in agreement.  Based on physical exam pelvic exam presumptive for possible yeast infection patient will be treated with Diflucan now and swab will be sent to the lab.  Patient verbalizes understanding and is in agreement with the plan of care.    Vaginal discharge  -     C. trachomatis/N. gonorrhoeae by AMP DNA  -     Vaginosis Screen by DNA Probe  -     fluconazole (DIFLUCAN) 150 MG Tab; Take 1 tablet (150 mg total) by mouth once daily. for 1 day  Dispense: 1 tablet; Refill: 0    Vaginal discomfort  -     POCT Urinalysis, Dipstick, Automated, W/O Scope  -     C. trachomatis/N. gonorrhoeae by AMP DNA  -     Vaginosis Screen by DNA Probe  -     fluconazole (DIFLUCAN) 150 MG Tab; Take 1 tablet (150 mg total) by mouth once daily. for 1 day  Dispense: 1 tablet; Refill: 0      Patient Instructions       Preventing Vaginal Infection  These steps can help you stay comfortable during treatment of a vaginal infection. They also help prevent vaginal infections in the future.  Keeping a healthy balance  Factors that change the normal balance in the vagina can lead to a vaginal infection. To help keep the balance normal, try these tips:  · Change out of wet bathing suits and damp exercise clothes as soon as possible. Yeast thrive in a warm, moist environment.  · Avoid wearing tight pants. Choose cotton underwear and pantyhose that have a cotton crotch. Cotton keeps you cooler and drier than synthetics.  · Don't douche unless directed by your health care provider. Douching can destroy friendly bacteria and change the vagina's normal  balance.  · Wipe from front to back after using the toilet. This prevents bacteria from spreading from the anus to the vulva.  · Wash the vulva with mild, unscented soap or with plain water.  · Wash your diaphragm, spermicide applicators, and sex toys with mild soap and water after use. Dry them thoroughly before putting them away.  · Change tampons often (every 2 hours to 4 hours). Leaving a tampon in for too long may disrupt the balance of vaginal bacteria.  · Avoid vaginal sprays, scented toilet paper and soaps, and deodorant tampons or pads, which can cause vaginal irritation  Staying healthy overall  Good overall health can help you resist infection. To be healthier:  · Help protect yourself from STDs by using latex condoms for intercourse. Ask your health care provider for more information about safer sex.  · Eat a variety of healthy foods.  · Exercise regularly.  · Get enough rest and sleep.  · Maintain a healthy weight. If you need to lose weight, ask your health care provider for advice on how to start.  Date Last Reviewed: 5/18/2015  © 2821-2173 [employer]. [address] [place] [address]. All rights reserved. This information is not intended as a substitute for professional medical care. Always follow your healthcare professional's instructions.        Vaginal Infection: Yeast (Candidiasis)  Yeast infection occurs when yeast in the vagina increase and attacks the vaginal tissues. Yeast is a type of fungus. These infections are often caused by a type of yeast called Candida albicans. Other species of yeast can also cause infections. Factors that may make infection more likely include recent antibiotic use, douching, or increased sex. Yeast infections are more common in women who have diabetes, or are obese or pregnant, or have a weak immune system.  Symptoms of yeast infection  · Clumpy or thin, white discharge, which may look like cottage cheese  · No odor or minimal  odor  · Severe vaginal itching or burning  · Burning with urination  · Swelling, redness of vulva  · Pain during sex  Treating yeast infection  Yeast infection is treated with a vaginal antifungal cream. In some cases, antifungal pills are prescribed instead. During treatment:  · Finish all of your medicine, even if your symptoms go away.  · Apply the cream before going to bed. Lie flat after applying so that it doesn't drip out.  · Do not douche or use tampons.  · Don't rely on a diaphragm or condoms, since the cream may weaken them.  · Avoid intercourse if advised by your healthcare provider.     Should I treat a yeast infection myself?  Discuss with your healthcare provider whether you should use over-the-counter medicines to treat a yeast infection. Self-treatment may depend on whether:  · You've had a yeast infection in the past.  · You're at risk for STDs.  Call your healthcare provider if symptoms do not go away or come back after treatment.   Date Last Reviewed: 3/1/2017  © 2551-0365 [employer]. [address] [place] [address]. All rights reserved. This information is not intended as a substitute for professional medical care. Always follow your healthcare professional's instructions.      -Your swab will be sent to the lab and you will be contacted within 3-4 days with your results.  -No sexual intercourse until you are contacted with your results.  -Diflucan to take now.  -be sure to drink plenty of fluids.  Please follow up with your Primary care provider within 2-5 days if your signs and symptoms have not resolved or worsen.     If your condition worsens or fails to improve we recommend that you receive another evaluation at the emergency room immediately or contact your primary medical clinic to discuss your concerns.   You must understand that you have received an Urgent Care treatment only and that you may be released before all of your medical problems are known or treated.  You, the patient, will arrange for follow up care as instructed.

## 2018-09-11 NOTE — PATIENT INSTRUCTIONS
Preventing Vaginal Infection  These steps can help you stay comfortable during treatment of a vaginal infection. They also help prevent vaginal infections in the future.  Keeping a healthy balance  Factors that change the normal balance in the vagina can lead to a vaginal infection. To help keep the balance normal, try these tips:  · Change out of wet bathing suits and damp exercise clothes as soon as possible. Yeast thrive in a warm, moist environment.  · Avoid wearing tight pants. Choose cotton underwear and pantyhose that have a cotton crotch. Cotton keeps you cooler and drier than synthetics.  · Don't douche unless directed by your health care provider. Douching can destroy friendly bacteria and change the vagina's normal balance.  · Wipe from front to back after using the toilet. This prevents bacteria from spreading from the anus to the vulva.  · Wash the vulva with mild, unscented soap or with plain water.  · Wash your diaphragm, spermicide applicators, and sex toys with mild soap and water after use. Dry them thoroughly before putting them away.  · Change tampons often (every 2 hours to 4 hours). Leaving a tampon in for too long may disrupt the balance of vaginal bacteria.  · Avoid vaginal sprays, scented toilet paper and soaps, and deodorant tampons or pads, which can cause vaginal irritation  Staying healthy overall  Good overall health can help you resist infection. To be healthier:  · Help protect yourself from STDs by using latex condoms for intercourse. Ask your health care provider for more information about safer sex.  · Eat a variety of healthy foods.  · Exercise regularly.  · Get enough rest and sleep.  · Maintain a healthy weight. If you need to lose weight, ask your health care provider for advice on how to start.  Date Last Reviewed: 5/18/2015  © 9236-6370 [employer]. [address] [place] [address], [place] [address]. All rights reserved. This information is not intended as a substitute  for professional medical care. Always follow your healthcare professional's instructions.        Vaginal Infection: Yeast (Candidiasis)  Yeast infection occurs when yeast in the vagina increase and attacks the vaginal tissues. Yeast is a type of fungus. These infections are often caused by a type of yeast called Candida albicans. Other species of yeast can also cause infections. Factors that may make infection more likely include recent antibiotic use, douching, or increased sex. Yeast infections are more common in women who have diabetes, or are obese or pregnant, or have a weak immune system.  Symptoms of yeast infection  · Clumpy or thin, white discharge, which may look like cottage cheese  · No odor or minimal odor  · Severe vaginal itching or burning  · Burning with urination  · Swelling, redness of vulva  · Pain during sex  Treating yeast infection  Yeast infection is treated with a vaginal antifungal cream. In some cases, antifungal pills are prescribed instead. During treatment:  · Finish all of your medicine, even if your symptoms go away.  · Apply the cream before going to bed. Lie flat after applying so that it doesn't drip out.  · Do not douche or use tampons.  · Don't rely on a diaphragm or condoms, since the cream may weaken them.  · Avoid intercourse if advised by your healthcare provider.     Should I treat a yeast infection myself?  Discuss with your healthcare provider whether you should use over-the-counter medicines to treat a yeast infection. Self-treatment may depend on whether:  · You've had a yeast infection in the past.  · You're at risk for STDs.  Call your healthcare provider if symptoms do not go away or come back after treatment.   Date Last Reviewed: 3/1/2017  © 8768-1868 [employer]. [address] [place] [address], [place] [address]. All rights reserved. This information is not intended as a substitute for professional medical care. Always follow your healthcare professional's  instructions.      -Your swab will be sent to the lab and you will be contacted within 3-4 days with your results.  -No sexual intercourse until you are contacted with your results.  -Diflucan to take now.  -be sure to drink plenty of fluids.  Please follow up with your Primary care provider within 2-5 days if your signs and symptoms have not resolved or worsen.     If your condition worsens or fails to improve we recommend that you receive another evaluation at the emergency room immediately or contact your primary medical clinic to discuss your concerns.   You must understand that you have received an Urgent Care treatment only and that you may be released before all of your medical problems are known or treated. You, the patient, will arrange for follow up care as instructed.

## 2018-09-12 LAB
C TRACH DNA SPEC QL NAA+PROBE: NOT DETECTED
N GONORRHOEA DNA SPEC QL NAA+PROBE: NOT DETECTED

## 2018-09-13 ENCOUNTER — TELEPHONE (OUTPATIENT)
Dept: URGENT CARE | Facility: CLINIC | Age: 53
End: 2018-09-13

## 2018-09-13 DIAGNOSIS — N94.9 VAGINAL DISCOMFORT: ICD-10-CM

## 2018-09-13 DIAGNOSIS — N89.8 VAGINAL DISCHARGE: ICD-10-CM

## 2018-09-13 LAB
CANDIDA RRNA VAG QL PROBE: NEGATIVE
G VAGINALIS RRNA GENITAL QL PROBE: NEGATIVE
T VAGINALIS RRNA GENITAL QL PROBE: NEGATIVE

## 2018-09-13 RX ORDER — FLUCONAZOLE 150 MG/1
150 TABLET ORAL DAILY
Qty: 1 TABLET | Refills: 0 | Status: SHIPPED | OUTPATIENT
Start: 2018-09-13 | End: 2018-09-14

## 2018-09-13 NOTE — TELEPHONE ENCOUNTER
Notified of negative results, states the diflucan helped. Sent fover rx for refill of diflucan and she is to follow with her OBGYN

## 2018-09-19 ENCOUNTER — OFFICE VISIT (OUTPATIENT)
Dept: INTERNAL MEDICINE | Facility: CLINIC | Age: 53
End: 2018-09-19
Payer: COMMERCIAL

## 2018-09-19 VITALS
WEIGHT: 194.44 LBS | DIASTOLIC BLOOD PRESSURE: 100 MMHG | BODY MASS INDEX: 32.4 KG/M2 | HEART RATE: 75 BPM | SYSTOLIC BLOOD PRESSURE: 164 MMHG | HEIGHT: 65 IN | OXYGEN SATURATION: 98 %

## 2018-09-19 DIAGNOSIS — I10 BENIGN ESSENTIAL HTN: Primary | ICD-10-CM

## 2018-09-19 PROCEDURE — 99213 OFFICE O/P EST LOW 20 MIN: CPT | Mod: S$GLB,,, | Performed by: INTERNAL MEDICINE

## 2018-09-19 PROCEDURE — 99999 PR PBB SHADOW E&M-EST. PATIENT-LVL III: CPT | Mod: PBBFAC,,, | Performed by: INTERNAL MEDICINE

## 2018-09-19 RX ORDER — AMLODIPINE BESYLATE 5 MG/1
5 TABLET ORAL DAILY
Qty: 90 TABLET | Refills: 0 | Status: SHIPPED | OUTPATIENT
Start: 2018-09-19 | End: 2018-10-30 | Stop reason: SDUPTHER

## 2018-09-19 NOTE — PROGRESS NOTES
INTERNAL MEDICINE ESTABLISHED PATIENT VISIT NOTE    Subjective:     Chief Complaint: Follow-up  HTN     Patient ID: Leah Minaya is a 53 y.o. female with HTN, preDM, HLD, B carpal tunnel, OA B knees, Vit D def, last seen by me in July, here today for f/u BP.    At last appt, bp elevated but pt reported that she had not been taking meds.  Was restarted and pt has been taking HCTZ 25 daily as rx'ed.    Reports she feels a little anxious today.    Past Medical History:  Past Medical History:   Diagnosis Date    Benign essential HTN     Bilateral carpal tunnel syndrome 5/8/2017    History of prediabetes     Mixed hyperlipidemia 5/8/2017    Primary osteoarthritis of knees, bilateral     Vitamin D deficiency        Home Medications:  Prior to Admission medications    Medication Sig Start Date End Date Taking? Authorizing Provider   clindamycin phosphate 1% (CLINDAGEL) 1 % gel Apply topically 2 (two) times daily. 7/31/18  Yes Didi Bermudez MD   fluticasone (FLONASE) 50 mcg/actuation nasal spray 1 spray (50 mcg total) by Each Nare route once daily. 4/6/18  Yes Jarrod Harman MD   hydroCHLOROthiazide (HYDRODIURIL) 25 MG tablet Take 1 tablet (25 mg total) by mouth once daily. 7/31/18 7/31/19 Yes Didi Bermudez MD   hydroquinone 2 % Crea Apply topically as needed. 7/31/18  Yes Didi Bermudez MD   metronidazole 1% (METROGEL) 1 % Gel Apply topically once daily.   Yes Historical Provider, MD   albuterol 90 mcg/actuation inhaler Inhale 2 puffs into the lungs every 4 (four) hours as needed for Wheezing or Shortness of Breath. 4/6/18 5/6/18  Jarrod Harman MD   amLODIPine (NORVASC) 5 MG tablet Take 1 tablet (5 mg total) by mouth once daily. 9/19/18 9/19/19  Didi Bermudez MD   cetirizine (ZYRTEC) 10 MG tablet Take 1 tablet (10 mg total) by mouth once daily. 4/6/18 5/6/18  Jarrod Harman MD   mometasone 0.1% (ELOCON) 0.1 % cream Apply topically once daily. for 10 days 7/31/18 8/10/18  Didi Bermudez MD       Allergies:  Review of patient's  "allergies indicates:  No Known Allergies    Social History:  Social History     Tobacco Use    Smoking status: Never Smoker    Smokeless tobacco: Never Used   Substance Use Topics    Alcohol use: No    Drug use: No        Review of Systems   Constitutional: Negative for chills, fatigue and fever.   Respiratory: Negative for cough, chest tightness and shortness of breath.    Cardiovascular: Negative for chest pain.   Gastrointestinal: Negative for abdominal pain and blood in stool.   Genitourinary: Negative for dysuria and frequency.         Health Maintenance:   Immunizations:   Influenza is up to date 1/2017, declined today  TDap is up to date 8/2012     Cancer Screening:  PAP: is up to date. 12/2016 neg c neg HPV, EMB 12/2016 benign (Dr. Bucio), has f/u 8/17/18  Mammogram: 7/30/18 at [facility], awaiting results.  Colonoscopy: needed since now 52, pt to get at [facility]        Objective:   BP (!) 164/100 (BP Location: Right arm, Patient Position: Sitting, BP Method: Large (Manual))   Pulse 75   Ht 5' 5" (1.651 m)   Wt 88.2 kg (194 lb 7.1 oz)   LMP 09/17/2018   SpO2 98%   BMI 32.36 kg/m²        General: AAO x3, no apparent distress  CV: RRR, no m/r/g  Pulm: Lungs CTAB, no crackles, no wheezes  Abd: s/NT/ND +BS  Extremities: no c/c/e    Labs:         Assessment/Plan     Leah was seen today for follow-up.    Diagnoses and all orders for this visit:    Benign essential HTN  Elevated today  Will cont hctz 25 and add amlodipine.  Risks and benefits were discussed with patient.  -     amLODIPine (NORVASC) 5 MG tablet; Take 1 tablet (5 mg total) by mouth once daily.    HM as above  RTC in 1 mo for bp check    Didi Bermudez MD  Department of Internal Medicine - [facility]  09/19/2018  "

## 2018-10-30 ENCOUNTER — OFFICE VISIT (OUTPATIENT)
Dept: INTERNAL MEDICINE | Facility: CLINIC | Age: 53
End: 2018-10-30
Payer: COMMERCIAL

## 2018-10-30 VITALS
DIASTOLIC BLOOD PRESSURE: 84 MMHG | HEART RATE: 75 BPM | HEIGHT: 65 IN | OXYGEN SATURATION: 99 % | WEIGHT: 194.88 LBS | BODY MASS INDEX: 32.47 KG/M2 | SYSTOLIC BLOOD PRESSURE: 132 MMHG

## 2018-10-30 DIAGNOSIS — M62.838 TRAPEZIUS MUSCLE SPASM: ICD-10-CM

## 2018-10-30 DIAGNOSIS — I10 BENIGN ESSENTIAL HTN: Primary | ICD-10-CM

## 2018-10-30 DIAGNOSIS — E78.2 MIXED HYPERLIPIDEMIA: ICD-10-CM

## 2018-10-30 DIAGNOSIS — M17.0 PRIMARY OSTEOARTHRITIS OF KNEES, BILATERAL: ICD-10-CM

## 2018-10-30 DIAGNOSIS — R73.03 PREDIABETES: ICD-10-CM

## 2018-10-30 DIAGNOSIS — L30.9 ECZEMA, UNSPECIFIED TYPE: ICD-10-CM

## 2018-10-30 PROCEDURE — 99999 PR PBB SHADOW E&M-EST. PATIENT-LVL IV: CPT | Mod: PBBFAC,,, | Performed by: INTERNAL MEDICINE

## 2018-10-30 PROCEDURE — 99214 OFFICE O/P EST MOD 30 MIN: CPT | Mod: S$GLB,,, | Performed by: INTERNAL MEDICINE

## 2018-10-30 RX ORDER — AMLODIPINE BESYLATE 5 MG/1
5 TABLET ORAL DAILY
Qty: 90 TABLET | Refills: 1 | Status: SHIPPED | OUTPATIENT
Start: 2018-10-30 | End: 2024-01-29

## 2018-10-30 RX ORDER — TRIAMCINOLONE ACETONIDE 1 MG/G
CREAM TOPICAL 2 TIMES DAILY
Qty: 45 G | Refills: 0 | Status: SHIPPED | OUTPATIENT
Start: 2018-10-30

## 2018-10-30 RX ORDER — CYCLOBENZAPRINE HCL 5 MG
5 TABLET ORAL NIGHTLY PRN
Qty: 30 TABLET | Refills: 0 | Status: SHIPPED | OUTPATIENT
Start: 2018-10-30 | End: 2018-12-22 | Stop reason: SDUPTHER

## 2018-10-30 NOTE — PROGRESS NOTES
INTERNAL MEDICINE ESTABLISHED PATIENT VISIT NOTE    Subjective:     Chief Complaint: Follow-up  HTN     Patient ID: Leah Minaya is a 53 y.o. female with HTN, preDM, HLD, B carpal tunnel, OA B knees, Vit D def, last seen by me a month ago at which time BP elevated so Amlodipine was added to her HCTZ, here today for BP f/u.    Fell about two weeks ago and says her knee pain has gotten better since.  Still does have some stiffness and says no one from PT ever called her.  Would still to get scheduled.    Also reports skin on her back feels dry and itchy.    Also reports tightness on mm of her shoulders.  Prev tx'ed c prn flexeril which helped.    Past Medical History:  Past Medical History:   Diagnosis Date    Benign essential HTN     Bilateral carpal tunnel syndrome 5/8/2017    History of prediabetes     Mixed hyperlipidemia 5/8/2017    Primary osteoarthritis of knees, bilateral     Vitamin D deficiency        Home Medications:  Prior to Admission medications    Medication Sig Start Date End Date Taking? Authorizing Provider   albuterol 90 mcg/actuation inhaler Inhale 2 puffs into the lungs every 4 (four) hours as needed for Wheezing or Shortness of Breath. 4/6/18 5/6/18  Jarrod Harman MD   amLODIPine (NORVASC) 5 MG tablet Take 1 tablet (5 mg total) by mouth once daily. 9/19/18 9/19/19  Didi Bermudez MD   cetirizine (ZYRTEC) 10 MG tablet Take 1 tablet (10 mg total) by mouth once daily. 4/6/18 5/6/18  Jarrod Harman MD   clindamycin phosphate 1% (CLINDAGEL) 1 % gel Apply topically 2 (two) times daily. 7/31/18   Didi Bermudez MD   fluticasone (FLONASE) 50 mcg/actuation nasal spray 1 spray (50 mcg total) by Each Nare route once daily. 4/6/18   Jarrod Harman MD   hydroCHLOROthiazide (HYDRODIURIL) 25 MG tablet Take 1 tablet (25 mg total) by mouth once daily. 7/31/18 7/31/19  Didi Bermudez MD   hydroquinone 2 % Crea Apply topically as needed. 7/31/18   Didi Bermudez MD   metronidazole 1% (METROGEL) 1 % Gel Apply topically once  "daily.    Historical Provider, MD   mometasone 0.1% (ELOCON) 0.1 % cream Apply topically once daily. for 10 days 7/31/18 8/10/18  Didi Bermudez MD       Allergies:  Review of patient's allergies indicates:  No Known Allergies    Social History:  Social History     Tobacco Use    Smoking status: Never Smoker    Smokeless tobacco: Never Used   Substance Use Topics    Alcohol use: No    Drug use: No        Review of Systems   Constitutional: Negative for chills, fatigue and fever.   Respiratory: Negative for cough, chest tightness and shortness of breath.    Cardiovascular: Negative for chest pain.   Gastrointestinal: Negative for abdominal pain and blood in stool.   Genitourinary: Negative for dysuria and frequency.   Musculoskeletal: Positive for arthralgias and myalgias (B shoulder).   Skin: Positive for rash.         Health Maintenance:     Immunizations:   Influenza today  TDap is up to date 8/2012     Cancer Screening:  PAP: is up to date. 12/2016 neg c neg HPV, EMB 12/2016 benign (Dr. Bucio), seen in Aug, had [employer]  Mammogram: 7/30/18 at [facility], awaiting results.  Colonoscopy: needed since now 52, pt to get at [facility], ordered at last appt, pt advised to schedule    Objective:   /84 (BP Location: Left arm, Patient Position: Sitting)   Pulse 75   Ht 5' 5" (1.651 m)   Wt 88.4 kg (194 lb 14.2 oz)   LMP 10/07/2018   SpO2 99%   BMI 32.43 kg/m²        General: AAO x3, no apparent distress  CV: RRR, no m/r/g  Pulm: Lungs CTAB, no crackles, no wheezes  Abd: s/NT/ND +BS  Extremities: no c/c/e  Skin: small patch of dry skin on back c mild hyperpigmentation, minimal scaling.    Labs:         Assessment/Plan     Leah was seen today for follow-up.    Diagnoses and all orders for this visit:    Benign essential HTN  Now at goal  Cont hctz and amlodipine  -     amLODIPine (NORVASC) 5 MG tablet; Take 1 tablet (5 mg total) by mouth once daily.    Mixed hyperlipidemia  Last labs from [facility] at goal, cont low fat diet.  LDL " 129.    Prediabetes  Normalized on recent labs at [facility], a1c 5.5    Primary osteoarthritis of knees, bilateral  -     As per HPI  -     Ambulatory consult to Physical Therapy    Trapezius muscle spasm  As per HPI  Ok for prn flexeril, 30 pills to last 4 mos.  -     cyclobenzaprine (FLEXERIL) 5 MG tablet; Take 1 tablet (5 mg total) by mouth nightly as needed for Muscle spasms.    Eczema, unspecified type  Noted on exam, will tx  -     triamcinolone acetonide 0.1% (KENALOG) 0.1 % cream; Apply topically 2 (two) times daily.    HM as above  RTC in 6 mos for f/u, sooner if needed.    iDdi Bermudez MD  Department of Internal Medicine - [facility]  10/30/2018

## 2018-11-09 ENCOUNTER — TELEPHONE (OUTPATIENT)
Dept: INTERNAL MEDICINE | Facility: CLINIC | Age: 53
End: 2018-11-09

## 2018-12-22 DIAGNOSIS — M62.838 TRAPEZIUS MUSCLE SPASM: ICD-10-CM

## 2018-12-24 RX ORDER — CYCLOBENZAPRINE HCL 5 MG
TABLET ORAL
Qty: 30 TABLET | Refills: 0 | Status: SHIPPED | OUTPATIENT
Start: 2018-12-24 | End: 2024-02-20

## 2019-01-18 ENCOUNTER — TELEPHONE (OUTPATIENT)
Dept: INTERNAL MEDICINE | Facility: CLINIC | Age: 54
End: 2019-01-18

## 2019-01-18 NOTE — TELEPHONE ENCOUNTER
----- Message from Marcia Sargent sent at 1/18/2019 10:58 AM CST -----  Contact: [phone]  Patient is requesting a call from the office in regards to her blood pressure reading today is 118/77 .    Please advise, thanks

## 2019-01-18 NOTE — TELEPHONE ENCOUNTER
Spoke with pt and she states her bp is low. Pt informed her bp is perfect. Pt c/o headaches . I told pt she needs an appt. Pt states she will call next week to let me know how she feels

## 2019-08-19 DIAGNOSIS — I10 BENIGN ESSENTIAL HTN: ICD-10-CM

## 2019-08-20 RX ORDER — HYDROCHLOROTHIAZIDE 25 MG/1
TABLET ORAL
Qty: 90 TABLET | Refills: 1 | Status: SHIPPED | OUTPATIENT
Start: 2019-08-20

## 2020-06-09 DIAGNOSIS — Z12.11 COLON CANCER SCREENING: ICD-10-CM

## 2020-08-21 DIAGNOSIS — Z12.39 BREAST CANCER SCREENING: ICD-10-CM

## 2021-08-23 DIAGNOSIS — M25.561 RIGHT KNEE PAIN, UNSPECIFIED CHRONICITY: Primary | ICD-10-CM

## 2021-08-23 DIAGNOSIS — M25.562 LEFT KNEE PAIN, UNSPECIFIED CHRONICITY: ICD-10-CM

## 2021-08-24 ENCOUNTER — HOSPITAL ENCOUNTER (OUTPATIENT)
Dept: RADIOLOGY | Facility: HOSPITAL | Age: 56
Discharge: HOME | End: 2021-08-24
Attending: ORTHOPAEDIC SURGERY
Payer: COMMERCIAL

## 2021-08-24 ENCOUNTER — OFFICE VISIT (OUTPATIENT)
Dept: ORTHOPEDICS | Facility: CLINIC | Age: 56
End: 2021-08-24
Payer: COMMERCIAL

## 2021-08-24 VITALS
HEIGHT: 65 IN | HEART RATE: 70 BPM | WEIGHT: 205 LBS | SYSTOLIC BLOOD PRESSURE: 181 MMHG | DIASTOLIC BLOOD PRESSURE: 90 MMHG | BODY MASS INDEX: 34.16 KG/M2

## 2021-08-24 DIAGNOSIS — M25.561 RIGHT KNEE PAIN, UNSPECIFIED CHRONICITY: ICD-10-CM

## 2021-08-24 DIAGNOSIS — M25.562 LEFT KNEE PAIN, UNSPECIFIED CHRONICITY: ICD-10-CM

## 2021-08-24 DIAGNOSIS — M17.12 PRIMARY OSTEOARTHRITIS OF LEFT KNEE: Primary | ICD-10-CM

## 2021-08-24 DIAGNOSIS — M17.11 PRIMARY OSTEOARTHRITIS OF RIGHT KNEE: ICD-10-CM

## 2021-08-24 PROCEDURE — 73564 XR KNEE COMP 4 OR MORE VIEWS BILAT: ICD-10-PCS | Mod: 26,50,, | Performed by: RADIOLOGY

## 2021-08-24 PROCEDURE — 73564 X-RAY EXAM KNEE 4 OR MORE: CPT | Mod: 26,50,, | Performed by: RADIOLOGY

## 2021-08-24 PROCEDURE — 99999 PR PBB SHADOW E&M-EST. PATIENT-LVL III: CPT | Mod: PBBFAC,,, | Performed by: ORTHOPAEDIC SURGERY

## 2021-08-24 PROCEDURE — 99214 PR OFFICE/OUTPT VISIT, EST, LEVL IV, 30-39 MIN: ICD-10-PCS | Mod: 25,S$GLB,, | Performed by: ORTHOPAEDIC SURGERY

## 2021-08-24 PROCEDURE — 99214 OFFICE O/P EST MOD 30 MIN: CPT | Mod: 25,S$GLB,, | Performed by: ORTHOPAEDIC SURGERY

## 2021-08-24 PROCEDURE — 20610 LARGE JOINT ASPIRATION/INJECTION: BILATERAL KNEE: ICD-10-PCS | Mod: 50,S$GLB,, | Performed by: ORTHOPAEDIC SURGERY

## 2021-08-24 PROCEDURE — 73564 X-RAY EXAM KNEE 4 OR MORE: CPT | Mod: TC,50,FY

## 2021-08-24 PROCEDURE — 99999 PR PBB SHADOW E&M-EST. PATIENT-LVL III: ICD-10-PCS | Mod: PBBFAC,,, | Performed by: ORTHOPAEDIC SURGERY

## 2021-08-24 PROCEDURE — 20610 DRAIN/INJ JOINT/BURSA W/O US: CPT | Mod: 50,S$GLB,, | Performed by: ORTHOPAEDIC SURGERY

## 2021-08-24 RX ORDER — MIRABEGRON 50 MG/1
1 TABLET, FILM COATED, EXTENDED RELEASE ORAL DAILY
COMMUNITY
Start: 2021-08-03

## 2021-08-24 RX ORDER — KETOROLAC TROMETHAMINE 30 MG/ML
30 INJECTION, SOLUTION INTRAMUSCULAR; INTRAVENOUS
Status: DISCONTINUED | OUTPATIENT
Start: 2021-08-24 | End: 2021-08-24 | Stop reason: HOSPADM

## 2021-08-24 RX ADMIN — KETOROLAC TROMETHAMINE 30 MG: 30 INJECTION, SOLUTION INTRAMUSCULAR; INTRAVENOUS at 08:08

## 2021-09-21 ENCOUNTER — OFFICE VISIT (OUTPATIENT)
Dept: ORTHOPEDICS | Facility: CLINIC | Age: 56
End: 2021-09-21
Payer: COMMERCIAL

## 2021-09-21 VITALS
WEIGHT: 192.25 LBS | HEIGHT: 65 IN | SYSTOLIC BLOOD PRESSURE: 188 MMHG | DIASTOLIC BLOOD PRESSURE: 96 MMHG | BODY MASS INDEX: 32.03 KG/M2 | HEART RATE: 75 BPM

## 2021-09-21 DIAGNOSIS — M17.12 PRIMARY OSTEOARTHRITIS OF LEFT KNEE: ICD-10-CM

## 2021-09-21 DIAGNOSIS — M17.11 PRIMARY OSTEOARTHRITIS OF RIGHT KNEE: Primary | ICD-10-CM

## 2021-09-21 PROCEDURE — 99999 PR PBB SHADOW E&M-EST. PATIENT-LVL III: CPT | Mod: PBBFAC,,, | Performed by: ORTHOPAEDIC SURGERY

## 2021-09-21 PROCEDURE — 20610 LARGE JOINT ASPIRATION/INJECTION: BILATERAL KNEE: ICD-10-PCS | Mod: 50,S$GLB,, | Performed by: ORTHOPAEDIC SURGERY

## 2021-09-21 PROCEDURE — 99999 PR PBB SHADOW E&M-EST. PATIENT-LVL III: ICD-10-PCS | Mod: PBBFAC,,, | Performed by: ORTHOPAEDIC SURGERY

## 2021-09-21 PROCEDURE — 20610 DRAIN/INJ JOINT/BURSA W/O US: CPT | Mod: 50,S$GLB,, | Performed by: ORTHOPAEDIC SURGERY

## 2021-09-21 PROCEDURE — 99499 NO LOS: ICD-10-PCS | Mod: S$GLB,,, | Performed by: ORTHOPAEDIC SURGERY

## 2021-09-21 PROCEDURE — 99499 UNLISTED E&M SERVICE: CPT | Mod: S$GLB,,, | Performed by: ORTHOPAEDIC SURGERY

## 2023-03-28 ENCOUNTER — HOSPITAL ENCOUNTER (OUTPATIENT)
Dept: RADIOLOGY | Facility: HOSPITAL | Age: 58
Discharge: HOME | End: 2023-03-28
Attending: ORTHOPAEDIC SURGERY
Payer: COMMERCIAL

## 2023-03-28 ENCOUNTER — OFFICE VISIT (OUTPATIENT)
Dept: ORTHOPEDICS | Facility: CLINIC | Age: 58
End: 2023-03-28
Payer: COMMERCIAL

## 2023-03-28 VITALS
SYSTOLIC BLOOD PRESSURE: 185 MMHG | HEART RATE: 73 BPM | DIASTOLIC BLOOD PRESSURE: 82 MMHG | HEIGHT: 65 IN | WEIGHT: 200.63 LBS | BODY MASS INDEX: 33.43 KG/M2

## 2023-03-28 DIAGNOSIS — M17.12 PRIMARY OSTEOARTHRITIS OF LEFT KNEE: ICD-10-CM

## 2023-03-28 DIAGNOSIS — M17.11 PRIMARY OSTEOARTHRITIS OF RIGHT KNEE: Primary | ICD-10-CM

## 2023-03-28 DIAGNOSIS — M25.562 PAIN IN BOTH KNEES, UNSPECIFIED CHRONICITY: ICD-10-CM

## 2023-03-28 DIAGNOSIS — M25.562 PAIN IN BOTH KNEES, UNSPECIFIED CHRONICITY: Primary | ICD-10-CM

## 2023-03-28 DIAGNOSIS — M25.561 PAIN IN BOTH KNEES, UNSPECIFIED CHRONICITY: Primary | ICD-10-CM

## 2023-03-28 DIAGNOSIS — M25.561 PAIN IN BOTH KNEES, UNSPECIFIED CHRONICITY: ICD-10-CM

## 2023-03-28 PROCEDURE — 20610 DRAIN/INJ JOINT/BURSA W/O US: CPT | Mod: 50,S$GLB,, | Performed by: ORTHOPAEDIC SURGERY

## 2023-03-28 PROCEDURE — 99999 PR PBB SHADOW E&M-EST. PATIENT-LVL IV: ICD-10-PCS | Mod: PBBFAC,,, | Performed by: ORTHOPAEDIC SURGERY

## 2023-03-28 PROCEDURE — 99214 OFFICE O/P EST MOD 30 MIN: CPT | Mod: 25,S$GLB,, | Performed by: ORTHOPAEDIC SURGERY

## 2023-03-28 PROCEDURE — 20610 LARGE JOINT ASPIRATION/INJECTION: BILATERAL KNEE: ICD-10-PCS | Mod: 50,S$GLB,, | Performed by: ORTHOPAEDIC SURGERY

## 2023-03-28 PROCEDURE — 99999 PR PBB SHADOW E&M-EST. PATIENT-LVL IV: CPT | Mod: PBBFAC,,, | Performed by: ORTHOPAEDIC SURGERY

## 2023-03-28 PROCEDURE — 73564 XR KNEE COMP 4 OR MORE VIEWS BILAT: ICD-10-PCS | Mod: 26,50,, | Performed by: RADIOLOGY

## 2023-03-28 PROCEDURE — 99214 PR OFFICE/OUTPT VISIT, EST, LEVL IV, 30-39 MIN: ICD-10-PCS | Mod: 25,S$GLB,, | Performed by: ORTHOPAEDIC SURGERY

## 2023-03-28 PROCEDURE — 73564 X-RAY EXAM KNEE 4 OR MORE: CPT | Mod: 26,50,, | Performed by: RADIOLOGY

## 2023-03-28 PROCEDURE — 73564 X-RAY EXAM KNEE 4 OR MORE: CPT | Mod: TC,50,PN

## 2023-03-28 RX ORDER — BUPIVACAINE HYDROCHLORIDE 5 MG/ML
5 INJECTION, SOLUTION PERINEURAL
Status: DISCONTINUED | OUTPATIENT
Start: 2023-03-28 | End: 2023-03-28 | Stop reason: HOSPADM

## 2023-03-28 RX ORDER — KETOROLAC TROMETHAMINE 30 MG/ML
30 INJECTION, SOLUTION INTRAMUSCULAR; INTRAVENOUS
Status: DISCONTINUED | OUTPATIENT
Start: 2023-03-28 | End: 2023-03-28 | Stop reason: HOSPADM

## 2023-03-28 RX ORDER — LIDOCAINE HYDROCHLORIDE 10 MG/ML
4 INJECTION INFILTRATION; PERINEURAL
Status: DISCONTINUED | OUTPATIENT
Start: 2023-03-28 | End: 2023-03-28 | Stop reason: HOSPADM

## 2023-03-28 RX ORDER — IBUPROFEN 200 MG
200 TABLET ORAL
COMMUNITY

## 2023-03-28 RX ADMIN — KETOROLAC TROMETHAMINE 30 MG: 30 INJECTION, SOLUTION INTRAMUSCULAR; INTRAVENOUS at 02:03

## 2023-03-28 RX ADMIN — LIDOCAINE HYDROCHLORIDE 4 ML: 10 INJECTION INFILTRATION; PERINEURAL at 02:03

## 2023-03-28 RX ADMIN — BUPIVACAINE HYDROCHLORIDE 5 ML: 5 INJECTION, SOLUTION PERINEURAL at 02:03

## 2023-03-28 NOTE — PROCEDURES
Large Joint Aspiration/Injection: bilateral knee    Date/Time: 3/28/2023 2:45 PM  Performed by: Mayur Rizzo MD  Authorized by: Mayur Rizzo MD     Consent Done?:  Yes (Verbal)  Indications:  Arthritis  Site marked: the procedure site was marked    Timeout: prior to procedure the correct patient, procedure, and site was verified    Prep: patient was prepped and draped in usual sterile fashion      Local anesthesia used?: Yes    Local anesthetic:  Topical anesthetic    Details:  Needle Size:  22 G  Ultrasonic Guidance for needle placement?: No    Approach:  Anterolateral  Location:  Knee  Laterality:  Bilateral  Site:  Bilateral knee  Medications (Right):  30 mg ketorolac 30 mg/mL (1 mL); 5 mL BUPivacaine 0.5 % (5 mg/mL); 4 mL LIDOcaine HCL 10 mg/ml (1%) 10 mg/mL (1 %)  Medications (Left):  30 mg ketorolac 30 mg/mL (1 mL); 5 mL BUPivacaine 0.5 % (5 mg/mL); 4 mL LIDOcaine HCL 10 mg/ml (1%) 10 mg/mL (1 %)  Patient tolerance:  Patient tolerated the procedure well with no immediate complications

## 2023-03-28 NOTE — PROGRESS NOTES
[facility] [address]          Subjective:      Patient ID: Leah Minaya is a 58 y.o. female.    Chief Complaint: Pain of the Left Knee and Pain of the Right Knee    HPI   Knee Pain: bilateral  swelling: yes  worsens with activity: yes  relieved by: injections          Past Medical History:   Diagnosis Date    Benign essential HTN     Bilateral carpal tunnel syndrome 5/8/2017    History of prediabetes     Mixed hyperlipidemia 5/8/2017    Primary osteoarthritis of knees, bilateral     Vitamin D deficiency         Past Surgical History:   Procedure Laterality Date    none          Current Outpatient Medications   Medication Instructions    albuterol 90 mcg/actuation inhaler 2 puffs, Inhalation, Every 4 hours PRN    amLODIPine (NORVASC) 5 mg, Oral, Daily    cetirizine (ZYRTEC) 10 mg, Oral, Daily    clindamycin phosphate 1% (CLINDAGEL) 1 % gel Topical (Top), 2 times daily    cyclobenzaprine (FLEXERIL) 5 MG tablet  TAKE ONE TABLET BY MOUTH NIGHTLY AS NEEDED FOR MUSCLE SPASMS    fluticasone propionate (FLONASE) 50 mcg, Each Nostril, Daily    hydroCHLOROthiazide (HYDRODIURIL) 25 MG tablet TAKE 1 TABLET BY MOUTH ONCE DAILY    hydroquinone 2 % Crea Apply topically as needed.    ibuprofen (ADVIL,MOTRIN) 200 mg, Oral    metronidazole 1% (METROGEL) 1 % Gel Topical (Top), Daily    mirabegron (MYRBETRIQ) 25 mg Tb24 ER tablet Oral    mometasone 0.1% (ELOCON) 0.1 % cream Topical (Top), Daily    MYRBETRIQ 50 mg Tb24 1 tablet, Oral, Daily    triamcinolone acetonide 0.1% (KENALOG) 0.1 % cream Topical (Top), 2 times daily        Review of patient's allergies indicates:  No Known Allergies    Social History     Socioeconomic History    Marital status: Single   Tobacco Use    Smoking status: Never    Smokeless tobacco: Never   Substance and Sexual Activity    Alcohol use: No    Drug use: No    Sexual activity: Yes   Social History Narrative    Works in [place]'s Office at [employer], also works at [employer] part-time.       Family  History   Problem Relation Age of Onset    Breast cancer Mother         dx at 52    Hepatitis Father         autoimmune    Hypertension Father     Alcohol abuse Brother     Hypertension Brother          ROS          Objective:                  Right Knee Exam     Inspection   Swelling: present  Effusion: present    Tenderness   The patient is tender to palpation of the medial joint line.    Range of Motion   Extension:  5   Flexion:  110     Tests   Patella   Patellar apprehension: negative  Passive Patellar Tilt: neutral  Patellar Tracking: normal    Other   Sensation: normal    Left Knee Exam     Inspection   Swelling: present  Effusion: present    Tenderness   The patient tender to palpation of the medial joint line.    Range of Motion   Extension:  5   Flexion:  110     Tests   Patella   Patellar apprehension: negative  Passive Patellar Tilt: neutral  Patellar Tracking: normal    Other   Sensation: normal    Muscle Strength   Right Lower Extremity   Quadriceps:  4/5   Hamstrin/5   Left Lower Extremity   Quadriceps:  4/5   Hamstrin/5             Assessment:      Plan :  we injected the bilateral knee w 1cc of ketorolac, marcaine and lidocaine under sterile conditions with the patient's informed consent for severe bone on bone knee oa. Follow up in 3 months        Mayur Rizzo MD   2023

## 2023-06-05 ENCOUNTER — PATIENT MESSAGE (OUTPATIENT)
Dept: ORTHOPEDICS | Facility: CLINIC | Age: 58
End: 2023-06-05
Payer: COMMERCIAL

## 2023-06-27 ENCOUNTER — OFFICE VISIT (OUTPATIENT)
Dept: ORTHOPEDICS | Facility: CLINIC | Age: 58
End: 2023-06-27
Payer: COMMERCIAL

## 2023-06-27 VITALS
WEIGHT: 201.94 LBS | HEART RATE: 76 BPM | SYSTOLIC BLOOD PRESSURE: 183 MMHG | HEIGHT: 65 IN | DIASTOLIC BLOOD PRESSURE: 111 MMHG | BODY MASS INDEX: 33.65 KG/M2

## 2023-06-27 DIAGNOSIS — M17.11 PRIMARY OSTEOARTHRITIS OF RIGHT KNEE: Primary | ICD-10-CM

## 2023-06-27 PROCEDURE — 99999 PR PBB SHADOW E&M-EST. PATIENT-LVL V: CPT | Mod: PBBFAC,,, | Performed by: ORTHOPAEDIC SURGERY

## 2023-06-27 PROCEDURE — 99999 PR PBB SHADOW E&M-EST. PATIENT-LVL V: ICD-10-PCS | Mod: PBBFAC,,, | Performed by: ORTHOPAEDIC SURGERY

## 2023-06-27 PROCEDURE — 99213 OFFICE O/P EST LOW 20 MIN: CPT | Mod: S$GLB,,, | Performed by: ORTHOPAEDIC SURGERY

## 2023-06-27 PROCEDURE — 99213 PR OFFICE/OUTPT VISIT, EST, LEVL III, 20-29 MIN: ICD-10-PCS | Mod: S$GLB,,, | Performed by: ORTHOPAEDIC SURGERY

## 2023-06-27 NOTE — PROGRESS NOTES
Subjective:      Patient ID: Leah Minaya is a 58 y.o. female.    Chief Complaint: Pain of the Left Knee and Pain of the Right Knee    Knee Pain: right  swelling: Yes  worsens with activity: Yes  relieved by: injections       Social History     Occupational History    Not on file   Tobacco Use    Smoking status: Never    Smokeless tobacco: Never   Substance and Sexual Activity    Alcohol use: No    Drug use: No    Sexual activity: Yes      Review of Systems   Constitutional: Negative for diaphoresis.   HENT:  Negative for ear discharge, nosebleeds and stridor.    Eyes:  Negative for photophobia.   Cardiovascular:  Negative for syncope.   Respiratory:  Negative for hemoptysis, shortness of breath and wheezing.    Neurological:  Negative for tremors.   Psychiatric/Behavioral: Negative.         Objective:    General    Constitutional: She is oriented to person, place, and time. She appears well-developed.   HENT:   Head: Normocephalic and atraumatic.   Right Ear: External ear normal.   Left Ear: External ear normal.   Eyes: EOM are normal.   Cardiovascular:  Intact distal pulses.            Neurological: She is alert and oriented to person, place, and time.   Psychiatric: She has a normal mood and affect. Her behavior is normal. Judgment and thought content normal.     General Musculoskeletal Exam   Gait: antalgic and abnormal       Right Knee Exam     Inspection   Swelling: present  Effusion: present    Tenderness   The patient is tender to palpation of the medial joint line.    Crepitus   The patient has crepitus of the patella.    Range of Motion   Flexion:  abnormal     Other   Sensation: normal    Muscle Strength   Right Lower Extremity   Quadriceps:  4/5   Hamstrin/5        Assessment:       1. Primary osteoarthritis of right knee          Plan:       Would like to consider tka in oct

## 2023-07-11 ENCOUNTER — ANESTHESIA EVENT (OUTPATIENT)
Dept: SURGERY | Facility: HOSPITAL | Age: 58
End: 2023-07-11
Payer: COMMERCIAL

## 2023-10-09 ENCOUNTER — ANESTHESIA (OUTPATIENT)
Dept: SURGERY | Facility: HOSPITAL | Age: 58
End: 2023-10-09
Payer: COMMERCIAL

## 2023-11-30 ENCOUNTER — RESEARCH ENCOUNTER (OUTPATIENT)
Dept: RESEARCH | Facility: HOSPITAL | Age: 58
End: 2023-11-30
Payer: COMMERCIAL

## 2023-11-30 ENCOUNTER — HOSPITAL ENCOUNTER (OUTPATIENT)
Dept: RADIOLOGY | Facility: HOSPITAL | Age: 58
Discharge: HOME | End: 2023-11-30
Attending: ORTHOPAEDIC SURGERY
Payer: COMMERCIAL

## 2023-11-30 ENCOUNTER — OFFICE VISIT (OUTPATIENT)
Dept: ORTHOPEDICS | Facility: CLINIC | Age: 58
End: 2023-11-30
Payer: COMMERCIAL

## 2023-11-30 ENCOUNTER — CLINICAL SUPPORT (OUTPATIENT)
Dept: LAB | Facility: HOSPITAL | Age: 58
End: 2023-11-30
Attending: ORTHOPAEDIC SURGERY
Payer: COMMERCIAL

## 2023-11-30 VITALS
HEIGHT: 65 IN | HEART RATE: 80 BPM | BODY MASS INDEX: 33.65 KG/M2 | SYSTOLIC BLOOD PRESSURE: 193 MMHG | DIASTOLIC BLOOD PRESSURE: 113 MMHG | WEIGHT: 201.94 LBS

## 2023-11-30 DIAGNOSIS — I10 BENIGN ESSENTIAL HTN: ICD-10-CM

## 2023-11-30 DIAGNOSIS — M25.562 CHRONIC PAIN OF LEFT KNEE: ICD-10-CM

## 2023-11-30 DIAGNOSIS — G89.29 CHRONIC PAIN OF LEFT KNEE: ICD-10-CM

## 2023-11-30 DIAGNOSIS — M62.81 QUADRICEPS WEAKNESS: ICD-10-CM

## 2023-11-30 DIAGNOSIS — E78.2 MIXED HYPERLIPIDEMIA: ICD-10-CM

## 2023-11-30 DIAGNOSIS — M17.12 PRIMARY OSTEOARTHRITIS OF LEFT KNEE: ICD-10-CM

## 2023-11-30 DIAGNOSIS — R73.03 PREDIABETES: ICD-10-CM

## 2023-11-30 DIAGNOSIS — G89.29 CHRONIC PAIN OF LEFT KNEE: Primary | ICD-10-CM

## 2023-11-30 DIAGNOSIS — M25.562 CHRONIC PAIN OF LEFT KNEE: Primary | ICD-10-CM

## 2023-11-30 PROCEDURE — 71045 X-RAY EXAM CHEST 1 VIEW: CPT | Mod: 26,,, | Performed by: RADIOLOGY

## 2023-11-30 PROCEDURE — 99999 PR PBB SHADOW E&M-EST. PATIENT-LVL V: ICD-10-PCS | Mod: PBBFAC,,, | Performed by: ORTHOPAEDIC SURGERY

## 2023-11-30 PROCEDURE — 77073 XR HIP TO ANKLE: ICD-10-PCS | Mod: 26,,, | Performed by: RADIOLOGY

## 2023-11-30 PROCEDURE — 93010 ELECTROCARDIOGRAM REPORT: CPT | Mod: ,,, | Performed by: INTERNAL MEDICINE

## 2023-11-30 PROCEDURE — 71045 XR CHEST 1 VIEW PRE-OP: ICD-10-PCS | Mod: 26,,, | Performed by: RADIOLOGY

## 2023-11-30 PROCEDURE — 71045 X-RAY EXAM CHEST 1 VIEW: CPT | Mod: TC,FY

## 2023-11-30 PROCEDURE — 99999 PR PBB SHADOW E&M-EST. PATIENT-LVL V: CPT | Mod: PBBFAC,,, | Performed by: ORTHOPAEDIC SURGERY

## 2023-11-30 PROCEDURE — 77073 BONE LENGTH STUDIES: CPT | Mod: TC,FY

## 2023-11-30 PROCEDURE — 93005 ELECTROCARDIOGRAM TRACING: CPT

## 2023-11-30 PROCEDURE — 99215 OFFICE O/P EST HI 40 MIN: CPT | Mod: S$GLB,,, | Performed by: ORTHOPAEDIC SURGERY

## 2023-11-30 PROCEDURE — 77073 BONE LENGTH STUDIES: CPT | Mod: 26,,, | Performed by: RADIOLOGY

## 2023-11-30 PROCEDURE — 93010 EKG 12-LEAD: ICD-10-PCS | Mod: ,,, | Performed by: INTERNAL MEDICINE

## 2023-11-30 PROCEDURE — 99215 PR OFFICE/OUTPT VISIT, EST, LEVL V, 40-54 MIN: ICD-10-PCS | Mod: S$GLB,,, | Performed by: ORTHOPAEDIC SURGERY

## 2023-11-30 NOTE — PROGRESS NOTES
Subjective:      Patient ID: Leah Minaya is a 58 y.o. female.    Chief Complaint: Pain of the Right Knee    Knee Pain: left  swelling: Yes  worsens with activity: Yes  relieved by: injections         Social History     Occupational History    Not on file   Tobacco Use    Smoking status: Never    Smokeless tobacco: Never   Substance and Sexual Activity    Alcohol use: No    Drug use: No    Sexual activity: Yes      Review of Systems   Constitutional: Negative for diaphoresis.   HENT:  Negative for ear discharge, nosebleeds and stridor.    Eyes:  Negative for photophobia.   Cardiovascular:  Negative for syncope.   Respiratory:  Negative for hemoptysis, shortness of breath and wheezing.    Neurological:  Negative for tremors.   Psychiatric/Behavioral: Negative.           Objective:    General    Constitutional: She is oriented to person, place, and time. She appears well-developed and well-nourished.   HENT:   Head: Normocephalic and atraumatic.   Right Ear: External ear normal.   Left Ear: External ear normal.   Eyes: EOM are normal.   Pulmonary/Chest: Effort normal.   Neurological: She is alert and oriented to person, place, and time.   Psychiatric: She has a normal mood and affect. Her behavior is normal. Judgment and thought content normal.     General Musculoskeletal Exam   Gait: antalgic and abnormal         Left Knee Exam     Inspection   Swelling: present  Effusion: present    Tenderness   The patient tender to palpation of the medial joint line.    Crepitus   The patient has crepitus of the patella.    Other   Sensation: normal    Muscle Strength   Left Lower Extremity   Quadriceps:  4/5   Hamstrin/5          Assessment:       1. Chronic pain of left knee    2. Primary osteoarthritis of left knee    3. Mixed hyperlipidemia    4. Benign essential HTN    5. Prediabetes    6. Quadriceps weakness          Plan:       The patient has failed non operative management, has painful crepitus, and has swelling.  The natural history of severe degenerative arthritis was discussed at length and nonoperative and operative treatment was reviewed. Due to the functional limitations and disability, I recommend left total knee replacement for KL 4.  She doesn't have much pain but struggles with rising from a chair and walking.The risks, benefits, convalescence, and alternatives were reviewed.  Numerous questions were asked and answered.  Models were used as an education tool.  Surgery will be scheduled at a convenient time.  The discussion with the patient included a description of a total knee replacement.  A total knee replacement (TKR) means a resurfacing of all three surfaces-the patella, femur, and tibia, with metal and plastic parts. The prosthetic parts are usually cemented into position and will allow a patient a full range of motion from. The postoperative motion, however, is determined by multiple factors, the most important of which is preoperative motion. In general, the better the motion preoperatively, the better the motion postoperatively.  In patients with good bone stock and bone quality (ie males, younger patients) I will generally use an uncemented tibial component and leave the patella unresurfaced, in an effort to preserve bone stock for any future revision surgeries. In those patients we discuss the risk of anterior knee pain in a small subset of patients (5-10%) with an unresurfaced patella. The operation, pending medical clearance, generally requires a hospitalization of 0-3 days for one knee (possibly longer for a bilateral replacement). In general, we prefer to perform the procedure under epidural/spinal anesthesia.  Patient blood donation will be based on the individual patient but is not common and based on preoperative hemoglobin/hematocrit levels.The operation will be done preferably in a minimally invasive fashion which will facilitate recovery.  While performing the procedure in a minimally invasive  manner is our preference, some patients are not candidates.  In that situation, a non-minimally invasive technique will be performed.  This will not adversely affect the long term success of the TKR.  Postoperatively, the patient is  walking the day of surgery and may be discahrged the day of surgery if stable with a walker or cane.  The first couple of days can be painful and the pain medication will alleviate but not eliminate the pain.  Thus, the patient must really push hard to get the range of motion.   The cane is to be dispensed with once the patient is secure enough.  In general, there is no cast or brace required with a routine knee replacement. In the long term, we do not encourage our patients to run for the sake of running; although, pending their preoperative status, we often allow patients to play doubles tennis or comparable activities.  We also allow them to do gentle intermediate downhill skiing if they are truly an expert skier.  Biking is encouraged as well as swimming.  The follow-up periods are usually at 2 weeks, 3 months, six months, and yearly intervals.  Potential complications with total knee replacements include infection (less than 2%), which is much higher in patients with obesity, diabetes, or other conditions which adversely affect the immune system.  (Patients with a previous history of osteomyelitis can have infection rates as high as 15%).  By nerve damage we mean a peroneal nerve palsy with a foot drop (a flapping foot with ambulation).  This is particularly more apt to occur with a bad valgus (knock knee) deformity.  The incidence can be quite high in this particular patient population.  There are always areas of skin numbness, but this is not an untoward effect, nor do we consider it a complication.  Other potential complications include dislocation of the patellar component (less than 2%).  The loosening of either the tibial or femoral component is much more infrequent.  It  usually occurs with infection or long-term use in a patient population that is at extreme risk (e.g., markedly overweight and not conscientious about their exercises).  Major blood vessel damage is also extremely rare.  Theoretically, because of the anatomic proximity of the popliteal artery, it could be lacerated with subsequent repairs required.  Albeit unlikely, a disruption of the popliteal artery could theoretically result in an amputation.  Similarly, infection could theoretically result in an amputation if one were to grow out an organism that cannot be controlled with antibiotics.  General medical complications include phlebitis for which we prophylactically anticoagulate, but it could still occur and fatal pulmonary embolus has been reported.  Cardiovascular problems, such as a heart attack or ischemia, are always a concern with such hemodynamic changes in the blood vascular system.  Our goal is to improve at least 80% of the pain and hopefully 100% but some aspects of the patients anatomy or other factors may not provide complete pain relief. Other general complications are very rare but in medicine anything could theoretically happen. We also discussed performing a pre-operative peripheral sensory block of the bracnhes of the AFCN and ISN of the same knee using iovera to help with pain control post surgery. This is a relatively new technology with early data demonstrating significant pain improvement and functional recovery. However the science defining all the associated risks is still developing. From what we know thus far, a small number of patients can have nerve pain along the areas of the treated nerves. I think the patient understands the risk benefit ratio of total knee replacement and the iovera treatment and would like to pursue the total knee replacement and iovera treatment. we will begin the pre-operative process.

## 2023-11-30 NOTE — PROGRESS NOTES
Protocol: innovations in Genicular Outcomes Registry (Raj)  Protocol#: Raj  IRB#: 2021.156  Version Date: 10-Clement-2023  PI: Mayur Rizzo MD  Patient Initials: C.S.     Study Recruitment Note:     Research coordinator met with patient, in private clinic room, to discuss above mentioned protocol. Patient is alert and oriented x 3. Mood and affect appropriate to the situation. Patient states that she is not participating in any other research studies. Patient states understanding about the diagnosis of osteoarthritis of the knee and of the procedure to being done on that knee.  Purpose of study reviewed with the patient.  Patient states understanding of this information.   Length of study and number of participants reviewed with patient; patient states understanding of the length of the study.   Study procedure discussed in detail with patient. Patient states understanding of this information.  Potential benefits of study along with costs and/or payment reimbursement per insurance discussed with patient; Patient states understanding that insurance will cover cost of all standard of care medications and procedures. Patient aware of $20 payment for qualifying visits with completed questionnaires.  Alternative treatment methods discussed with patient; Patient states understanding of this information.   Study related questions/compensation for injury, and whom to contact regarding rights as a research subject all reviewed with patient; Patient verbalizes understanding of this information.   Voluntary participation and withdrawal from research stressed to patient; patient states understanding that she may withdraw consent at any time without compromise to care.   Confidentiality and HIPAA discussed with patient. Patient verbalizes understanding of this information.          Patient states her interest in and will decide participation at Left Knee Iovera appointment, which is scheduled for 1/25/2024. She will also receive  Left TKA on 1/29/2024 Study brochure and paper copey of consent form was given to patient for review. She was instructed to call if she had any questions or concerns.

## 2023-12-13 ENCOUNTER — TELEPHONE (OUTPATIENT)
Dept: ORTHOPEDICS | Facility: CLINIC | Age: 58
End: 2023-12-13
Payer: COMMERCIAL

## 2023-12-13 ENCOUNTER — PATIENT MESSAGE (OUTPATIENT)
Dept: ORTHOPEDICS | Facility: CLINIC | Age: 58
End: 2023-12-13
Payer: COMMERCIAL

## 2023-12-13 NOTE — TELEPHONE ENCOUNTER
----- Message from Nancy Ocasio sent at 12/13/2023  3:28 PM CST -----  Contact: pt  Type:  Needs Medical Advice    Who Called: pt   Would the patient rather a call back or a response via [facility]? 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

## 2024-01-04 ENCOUNTER — TELEPHONE (OUTPATIENT)
Dept: ORTHOPEDICS | Facility: CLINIC | Age: 59
End: 2024-01-04
Payer: COMMERCIAL

## 2024-01-04 NOTE — TELEPHONE ENCOUNTER
Spoke with patient again and advised her her that her paper work is still at the  to be . Patient verbally understand and will be by tomorrow to  her paper work.

## 2024-01-04 NOTE — TELEPHONE ENCOUNTER
----- Message from Ericka Jones sent at 1/4/2024 12:00 PM CST -----  Type:  Needs Medical Advice    Who Called: pt  Would the patient rather a call back or a response via [facility]? call  Best Call Back Number:  [phone]  Additional Information: would like to speak with nurse returning call from over negro holiday called before christmas as well

## 2024-01-10 ENCOUNTER — OFFICE VISIT (OUTPATIENT)
Dept: FAMILY MEDICINE | Facility: HOSPITAL | Age: 59
End: 2024-01-10
Attending: NURSE PRACTITIONER
Payer: COMMERCIAL

## 2024-01-10 ENCOUNTER — TELEPHONE (OUTPATIENT)
Dept: REHABILITATION | Facility: HOSPITAL | Age: 59
End: 2024-01-10
Payer: COMMERCIAL

## 2024-01-10 ENCOUNTER — HOSPITAL ENCOUNTER (OUTPATIENT)
Dept: PREADMISSION TESTING | Facility: HOSPITAL | Age: 59
Discharge: HOME | End: 2024-01-10
Attending: NURSE PRACTITIONER
Payer: COMMERCIAL

## 2024-01-10 VITALS
WEIGHT: 203.25 LBS | RESPIRATION RATE: 16 BRPM | HEIGHT: 65 IN | TEMPERATURE: 98 F | OXYGEN SATURATION: 98 % | BODY MASS INDEX: 33.86 KG/M2 | HEART RATE: 78 BPM | SYSTOLIC BLOOD PRESSURE: 135 MMHG | DIASTOLIC BLOOD PRESSURE: 87 MMHG

## 2024-01-10 VITALS
WEIGHT: 203.25 LBS | OXYGEN SATURATION: 98 % | HEIGHT: 65 IN | SYSTOLIC BLOOD PRESSURE: 135 MMHG | BODY MASS INDEX: 33.86 KG/M2 | DIASTOLIC BLOOD PRESSURE: 87 MMHG | HEART RATE: 78 BPM

## 2024-01-10 DIAGNOSIS — Z01.818 PRE-OPERATIVE CLEARANCE: Primary | ICD-10-CM

## 2024-01-10 PROCEDURE — 99214 OFFICE O/P EST MOD 30 MIN: CPT

## 2024-01-10 RX ORDER — LIDOCAINE HYDROCHLORIDE 10 MG/ML
1 INJECTION, SOLUTION EPIDURAL; INFILTRATION; INTRACAUDAL; PERINEURAL ONCE
Status: CANCELLED | OUTPATIENT
Start: 2024-01-10 | End: 2024-01-10

## 2024-01-10 RX ORDER — SODIUM CHLORIDE, SODIUM LACTATE, POTASSIUM CHLORIDE, CALCIUM CHLORIDE 600; 310; 30; 20 MG/100ML; MG/100ML; MG/100ML; MG/100ML
INJECTION, SOLUTION INTRAVENOUS CONTINUOUS
Status: CANCELLED | OUTPATIENT
Start: 2024-01-10

## 2024-01-10 NOTE — TELEPHONE ENCOUNTER
Spoke with patient who will be calling back by end of day to tell [facility] if she is able to transfer to this facility for post-operative care.    Regina Connell, PT, DPT  
None

## 2024-01-10 NOTE — DISCHARGE INSTRUCTIONS
Your surgery is scheduled for 1/29/24.    Please report to [facility] on the 1st Floor at 0530 a.m.    THIS TIME IS SUBJECT TO CHANGE.  YOU WILL RECEIVE A PHONE CALL THE DAY BEFORE SURGERY BY 3:30 PM TO CONFIRM YOUR TIME OF ARRIVAL.  IF YOU HAVE NOT RECEIVED A PHONE CALL BY 3:30 PM THE DAY BEFORE YOUR SURGERY PLEASE CALL [phone]     INSTRUCTIONS IMPORTANT!!!  ¨ Do not eat or drink after 12 midnight-including water, candy, gum, & mints. OK to brush teeth.      ____  Proceed to [facility] on *** for additional testing.        ____  Do not wear makeup, including mascara.  ____  No powder, lotions or creams to surgical area.  ____  Please remove all jewelry, including piercings and leave at home.  ____  No money or valuables needed. Please leave at home.  ____  Please bring any documents given by your doctor.  ____  If going home the same day, arrange for a ride home. You will not be able to             drive if Anesthesia was used.  ____  Children under 18 years require a parent / guardian present the entire time             they are in surgery / recovery.  ____  Wear loose fitting clothing. Allow for dressings, bandages.  ____  Stop Aspirin, Ibuprofen, Motrin, Aleve, Goody's/BC powders, Excedrine and Naproxen (NSAIDS) at least 3-5 days before surgery, unless otherwise instructed by your doctor, or the nurse.   You MAY use Tylenol/acetaminophen until day of surgery.  ____  Wash the surgical area with Hibiclens or Dial Antibacterial bar soap the night before surgery, and again the             morning of surgery.  Be sure to rinse hibiclens or Dial Antibacterial bar soap off completely (if instructed by   nurse).  ____  If you take diabetic medication including Metformin, Glimepiride, Glipizide, Glyburide, Byetta, Januvia, Actos, do not take am of surgery unless instructed by Doctor.  ____ Hold Invokana, Farxiga, and Jardiance for 3 days prior to surgery.   ____  Call MD for temperature  above 101 degrees or any other signs of infection such as Urinary (bladder) infection, Upper respiratory infection, skin boils, etc.  ____ Stop taking any Fish Oil supplement or any Vitamins that contain Vitamin E at least 5 days prior to surgery.  ____ Do Not wear your contact lenses the day of your procedure.  You may wear your glasses.      ____Do not shave surgical site for 3 days prior to surgery.  ____ Practice Good hand washing before, during, and after procedure.      I have read or had read and explained to me, and understand the above information.  Additional comments or instructions:  For additional questions call [phone]      ANESTHESIA SIDE EFFECTS  -For the first 24 hours after surgery:  Do not drive, use heavy equipment, make important decisions, or drink alcohol  -It is normal to feel sleepy for several hours.  Rest until you are more awake.  -Have someone stay with you, if needed.  They can watch for problems and help keep you safe.  -Some possible post anesthesia side effects include: nausea and vomiting, sore throat and hoarseness, sleepiness, and dizziness.        Pre-Op Bathing Instructions    Before surgery, you can play an important role in your own health.    Because skin is not sterile, we need to be sure that your skin is as free of germs as possible. By following the instructions below, you can reduce the number of germs on your skin before surgery.    IMPORTANT: You will need to shower with a special soap called Hibiclens*, available at any pharmacy.  If you are allergic to Chlorhexidine (the antiseptic in Hibiclens), use an antibacterial soap such as Dial Soap for your preoperative shower.  You will shower with Hibiclens both the night before your surgery and the morning of your surgery.  Do not use Hibiclens on the head, face or genitals to avoid injury to those areas.    STEP #1: THE NIGHT BEFORE YOUR SURGERY     Do not shave the area of your body where your surgery will be  performed.  Shower and wash your hair and body as usual with your normal soap and shampoo.  Rinse your hair and body thoroughly after you shower to remove all soap residue.  With your hand, apply one packet of Hibiclens soap to the surgical site.   Wash the site gently for five (5) minutes. Do not scrub your skin too hard.   Do not wash with your regular soap after Hibiclens is used.  Rinse your body thoroughly.  Pat yourself dry with a clean, soft towel.  Do not use lotion, cream, or powder.  Wear clean clothes.    STEP #2: THE MORNING OF YOUR SURGERY     Repeat Step #1.    * Not to be used by people allergic to Chlorhexidine.

## 2024-01-10 NOTE — PRE-PROCEDURE INSTRUCTIONS
Son    Allergies, medical, surgical, family and psychosocial histories reviewed with patient. Periop plan of care reviewed. Preop instructions given, including medications to take and to hold. Hibiclens soap and instructions on use given. Time allotted for questions to be addressed.  Patient verbalized understanding.    Arrival time 0530

## 2024-01-10 NOTE — ANESTHESIA PREPROCEDURE EVALUATION
"                                                                                                             01/29/2024  Leah Minaya is a 58 y.o., female scheduled for ARTHROPLASTY, KNEE, TOTAL (Right) 1/29/24.    Per family medicine Dr. Mathew, "Patient is medically optimized with a low to moderate risk to proceed with (per ACC/AHA mehul-operative guidelines) a moderate risk surgery".    Past Medical History:   Diagnosis Date    Benign essential HTN     Bilateral carpal tunnel syndrome 5/8/2017    History of prediabetes     Mixed hyperlipidemia 5/8/2017    Primary osteoarthritis of knees, bilateral     Vitamin D deficiency      Past Surgical History:   Procedure Laterality Date    none       Review of patient's allergies indicates:  No Known Allergies    Current Outpatient Medications   Medication Instructions    acetaminophen (TYLENOL) 325 mg, Oral, Every 4 hours    albuterol 90 mcg/actuation inhaler 2 puffs, Inhalation, Every 4 hours PRN    amLODIPine (NORVASC) 5 mg, Oral, Daily    aspirin (ECOTRIN) 81 mg, Oral, 2 times daily    cephALEXin (KEFLEX) 500 mg, Oral, Every 6 hours    cetirizine (ZYRTEC) 10 mg, Oral, Daily    clindamycin phosphate 1% (CLINDAGEL) 1 % gel Topical (Top), 2 times daily    cyclobenzaprine (FLEXERIL) 5 MG tablet  TAKE ONE TABLET BY MOUTH NIGHTLY AS NEEDED FOR MUSCLE SPASMS    diclofenac (VOLTAREN) 75 mg, Oral, 2 times daily    famotidine (PEPCID) 20 mg, Oral, 2 times daily    fluticasone propionate (FLONASE) 50 mcg, Each Nostril, Daily    hydroCHLOROthiazide (HYDRODIURIL) 25 MG tablet TAKE 1 TABLET BY MOUTH ONCE DAILY    hydroquinone 2 % Crea Apply topically as needed.    ibuprofen (ADVIL,MOTRIN) 200 mg, Oral    metronidazole 1% (METROGEL) 1 % Gel Topical (Top), Daily    mirabegron (MYRBETRIQ) 25 mg Tb24 ER tablet Oral    mometasone 0.1% (ELOCON) 0.1 % cream Topical (Top), Daily    MYRBETRIQ 50 mg Tb24 1 tablet, Oral, Daily    tranexamic acid (LYSTEDA) 650 mg, Oral, 2 times daily    " triamcinolone acetonide 0.1% (KENALOG) 0.1 % cream Topical (Top), 2 times daily       Pre-op Assessment    I have reviewed the Patient Summary Reports.     I have reviewed the Nursing Notes. I have reviewed the NPO Status.   I have reviewed the Medications.     Review of Systems  Anesthesia Hx:  No problems with previous Anesthesia   History of prior surgery of interest to airway management or planning:          Denies Family Hx of Anesthesia complications.    Denies Personal Hx of Anesthesia complications.                    Social:  Non-Smoker, No Alcohol Use       Hematology/Oncology:  Hematology Normal   Oncology Normal                                   Cardiovascular:  Exercise tolerance: good   Denies Pacemaker. Hypertension       Denies Angina.     hyperlipidemia                             Pulmonary:       Denies Shortness of breath. Recent URI, resolved                 Renal/:   Denies Chronic Renal Disease.                Hepatic/GI:  Hepatic/GI Normal     Denies GERD. Denies Liver Disease.            Musculoskeletal:  Arthritis               Neurological:  Denies TIA.  Denies CVA. Neuromuscular Disease,   Denies Seizures.                                Endocrine:  Denies Diabetes. Denies Hypothyroidism.  Denies Hyperthyroidism.       Obesity / BMI > 30      Physical Exam  General: Cooperative, Alert, Oriented and Well nourished    Airway:  Mallampati: IV / III  Mouth Opening: Normal  TM Distance: Normal  Tongue: Large  Neck ROM: Normal ROM    Dental:  Intact    Chest/Lungs:  Clear to auscultation, Normal Respiratory Rate    Heart:  Rate: Normal  Rhythm: Regular Rhythm  Sounds: Normal      Lab Results   Component Value Date    WBC 5.03 11/30/2023    HGB 14.4 11/30/2023    HCT 44.3 11/30/2023     11/30/2023    CHOL 199 05/11/2011    TRIG 53 05/11/2011    HDL 54 05/11/2011    ALT 15 11/30/2023    AST 16 11/30/2023     11/30/2023    K 3.7 11/30/2023     11/30/2023    CREATININE 0.9  11/30/2023    BUN 10 11/30/2023    CO2 25 11/30/2023    TSH 1.93 05/11/2011    INR 0.9 11/30/2023    HGBA1C 5.3 11/30/2023     Results for orders placed or performed in visit on 11/30/23   EKG 12-lead    Collection Time: 11/30/23 10:41 AM    Narrative    Test Reason : M25.562,G89.29,M17.12,E78.2,I10,R73.03,M62.81,    Vent. Rate : 063 BPM     Atrial Rate : 063 BPM     P-R Int : 188 ms          QRS Dur : 082 ms      QT Int : 392 ms       P-R-T Axes : 043 032 016 degrees     QTc Int : 401 ms    Normal sinus rhythm  Possible Left atrial enlargement  Possible Anterior infarct ,age undetermined  Abnormal ECG  When compared with ECG of 11-MAY-2011 11:01,  No significant change was found  Confirmed by Antoine Byrne MD ([id number]) on 12/1/2023 5:26:15 PM    Referred By: SUZANNE VAUGHAN           Confirmed By:Antoine Byrne MD     CXR 11/30/23  FINDINGS:  The cardiomediastinal silhouette is not enlarged. There is no pleural effusion. The trachea is midline. The lungs are symmetrically expanded bilaterally with coarse interstitial attenuation bilaterally.. No large focal consolidation seen. There is no pneumothorax. The osseous structures are remarkable for degenerative change noting dextroscoliotic curvature of the spine..    Impression:  1. Interstitial findings are accentuated by habitus, no large focal consolidation.       Anesthesia Plan  Type of Anesthesia, risks & benefits discussed:    Anesthesia Type: Spinal, Epidural, Regional, CSE  Intra-op Monitoring Plan: Standard ASA Monitors  Post Op Pain Control Plan: multimodal analgesia and IV/PO Opioids PRN  Induction:  IV  Informed Consent: Informed consent signed with the Patient and all parties understand the risks and agree with anesthesia plan.  All questions answered.   ASA Score: 2  Day of Surgery Review of History & Physical: H&P Update referred to the surgeon/provider.  Anesthesia Plan Notes: Anesthesia consent to be signed prior to surgery 1/29/24      Ready For  Surgery From Anesthesia Perspective.     .

## 2024-01-10 NOTE — PROGRESS NOTES
"  [facility] FAMILY PRACTICE CLINIC NOTE  Pre-Op Visit      SUBJECTIVE:     Patient: Leah Minaya is a 58 y.o. female with a past medical history of hypertension who presents for preop evaluation.    Chief Compliant:   Chief Complaint   Patient presents with    Pre-op Exam       History of Present Illness:  Presents for pre-op evaluation. Scheduled for a right total knee arthroplasty January 29, 2024 by Dr. Mayur Rizzo.  The patient states that she takes hydrochlorothiazide 25 mg daily in addition to amlodipine 10 mg daily for blood pressure control.  She states that normally her blood pressure is elevated whenever she comes to the doctor's office, however today it appears well-controlled.  Per chart review, the patient had an EKG done on November 30, 2023 that overall showed no acute changes from a prior EKG done in 2011.  She endorses no history of cardiac or respiratory abnormalities in addition to this she denies any thyroid abnormalities.  DASI was performed results are below.  She does endorse some generalized fatigue that has been going on for months, recent lab work was unremarkable for both the CBC and CMP and the patient filled out a PHQ-9 in office.      Review of Systems   Constitutional:  Negative for chills and fever.   Respiratory:  Negative for sputum production.    Cardiovascular:  Negative for chest pain.   Gastrointestinal:  Negative for nausea and vomiting.         OBJECTIVE:     Vital Signs (Most Recent)  Vitals:    01/10/24 1023   BP: 135/87   BP Location: Left arm   Patient Position: Sitting   BP Method: Medium (Automatic)   Pulse: 78   SpO2: 98%   Weight: 92.2 kg (203 lb 4.2 oz)   Height: 5' 5" (1.651 m)     BMI: Body mass index is 33.82 kg/m².     Physical Exam:  Physical Exam  Constitutional:       General: She is not in acute distress.  HENT:      Head: Normocephalic.      Right Ear: External ear normal.      Left Ear: External ear normal.      Nose: Nose normal. No congestion or rhinorrhea.    "   Mouth/Throat:      Mouth: Mucous membranes are moist.      Pharynx: Oropharynx is clear. No oropharyngeal exudate.   Eyes:      Extraocular Movements: Extraocular movements intact.   Cardiovascular:      Pulses: Normal pulses.      Heart sounds: Normal heart sounds. No murmur heard.  Pulmonary:      Effort: Pulmonary effort is normal. No respiratory distress.      Breath sounds: Normal breath sounds. No wheezing or rales.   Musculoskeletal:         General: Normal range of motion.      Cervical back: Normal range of motion.      Comments: Patient walks with cane  Slight decreased strength, proximally 4+/5 at the bilateral knee joints  Strength 5/5 at the elbows and shoulders   Skin:     General: Skin is warm and dry.   Neurological:      Mental Status: She is alert.          ASSESSMENT:   Leah Minaya is a 58 y.o. female who presents to clinic to for preoperative evaluation.  In regard to the patient's blood pressure, it appears well-controlled at least at this clinic visit.  In regard to her fatigue, her recent cardiac and metabolic workup makes it seem less suspicious for a more sinister etiology.  Her PHQ-9 was also unremarkable making psychological factors a less likely etiology.  Given the patient's functional status her fatigue may be secondary to deconditioning, however appropriate precautions should be had with any procedure.    1. Pre-operative clearance         PLAN:   Pre-operative evaluation with risk assessment              -           Scheduled for Total Knee Arthroplasty on 01/29/24 by Dr. Rizzo.  -           DASI score: 58.2 w/ Functional Capacity in METS: 9.89 (>4) with a revised cardiac risk index of 0.    - Patient is not on antiplatelets/anticoagulation.   - Patient does not know have a history of blood dyscrasias or previous reaction to anesthesia   - Patient  is a NEVER SMOKER.  - Patient does have a prior h/o HTN.  - Patient does not have a prior h/o HLD/CAD w/ either MI or CVA.  .  - Patient does not have a prior h/o DM. Last A1C: 5.3% on 11/30/23  - Patient does not have a prior h/o thyroid disease.   - Patient does not have a prior h/o COPD/Asthma/NGUYEN/OHS. does not use CPAP. does not have changes from baseline function.   - Patient does not have a prior h/o HF.  - BMI: Body mass index is 33.82 kg/m².              -           Patient is medically optimized with a low to moderate risk to proceed with (per ACC/AHA mehul-operative guidelines) a moderate risk surgery.   - Please call our office for any additional questions or concerns.      Pre-operative clearance        Provided patient with anticipatory guidance and return precautions. Treatment plan discussed with patient, all questions answered, and patient acknowledged understanding and verbal agreement.      Case discussed with Dr. SHANNA Levi    ________________________  Anoop Mathew Jr, MD  [facility] Family Medicine PGY-1

## 2024-01-22 NOTE — DISCHARGE INSTRUCTIONS
Post Op Total Knee Arthroplasty Instructions     1. Enteric coated aspirin 81 mg by mouth twice a day for 6 weeks to prevent blood clots,  unless otherwise indicated.  2. Please take a stomach reflux medication such as pepcid, prevacid, nexium (H-2 blocker or PPI) while on aspirin to prevent stomach ulcers. You will be given a prescription for pepcid.  3. Nitin stockings should be worn as much as possible for 6 weeks to prevent blood clots.  4. Do not start antibiotics for any suspected infections related to the surgery until evaluated by dr elise staff  5. No driving for approximately 2-4 weeks  6. You can shower once the incision is completely dry, otherwise place a new dry dressing twice a day if there is drainage. Please call the office if the drainage increases after discharge.  7. You may resume all pre-surgery medications unless otherwise indicated  8. All patients should be seen in Dr Elise office approx 2 weeks after surgery  9. Dr Rizzo prefers outpatient physical therapy upon discharge home. If home PT is needed, please contact Dr Rizzo for approval  10. Patients should see their primary care doctor after discharge home

## 2024-01-25 ENCOUNTER — PROCEDURE VISIT (OUTPATIENT)
Dept: ORTHOPEDICS | Facility: CLINIC | Age: 59
End: 2024-01-25
Payer: COMMERCIAL

## 2024-01-25 ENCOUNTER — RESEARCH ENCOUNTER (OUTPATIENT)
Dept: RESEARCH | Facility: HOSPITAL | Age: 59
End: 2024-01-25
Payer: COMMERCIAL

## 2024-01-25 VITALS
SYSTOLIC BLOOD PRESSURE: 152 MMHG | HEART RATE: 55 BPM | BODY MASS INDEX: 33.86 KG/M2 | WEIGHT: 203.25 LBS | HEIGHT: 65 IN | DIASTOLIC BLOOD PRESSURE: 89 MMHG

## 2024-01-25 DIAGNOSIS — M25.562 CHRONIC PAIN OF LEFT KNEE: ICD-10-CM

## 2024-01-25 DIAGNOSIS — R73.03 PREDIABETES: ICD-10-CM

## 2024-01-25 DIAGNOSIS — M62.81 QUADRICEPS WEAKNESS: ICD-10-CM

## 2024-01-25 DIAGNOSIS — E78.2 MIXED HYPERLIPIDEMIA: ICD-10-CM

## 2024-01-25 DIAGNOSIS — M17.12 PRIMARY OSTEOARTHRITIS OF LEFT KNEE: ICD-10-CM

## 2024-01-25 DIAGNOSIS — G89.29 CHRONIC PAIN OF LEFT KNEE: ICD-10-CM

## 2024-01-25 DIAGNOSIS — I10 BENIGN ESSENTIAL HTN: ICD-10-CM

## 2024-01-25 PROCEDURE — 64640 INJECTION TREATMENT OF NERVE: CPT | Mod: LT,S$GLB,, | Performed by: ORTHOPAEDIC SURGERY

## 2024-01-25 PROCEDURE — 99499 UNLISTED E&M SERVICE: CPT | Mod: S$GLB,,, | Performed by: ORTHOPAEDIC SURGERY

## 2024-01-25 NOTE — PROGRESS NOTES
Protocol: innovations in Genicular Outcomes Registry (Raj)  Protocol#: Raj  IRB#: 2021.156  Version Date: 10-Clement-2023  PI: Mayur Rizzo MD  Patient Initials: ALON   (Study ID#: [id number])    Screening Note:    Patient reports the following information during screening visit.    Demographics:  female  Age: 58  Black or -American  Non-    Social Hx:  Marital Status: Single  Never smoker  Hx of Alcohol Abuse or dependency: No  Hx of Opioid abuse: No  Hx of Illicit drug abuse: No    Economic hx:  Current Work Status: Employed  Type of Physical Activity at work is sedentary. Patient works as a .   # of days missed in the past 30 days due to OA affected knee: not applicable   Status: No  Level of physical activity during last 12 months: moderate  Highest Level of Education Obtained: 3yrs of college    Medical Hx:  Target Knee for study: left   OA Severity according to patient: severe  Year OA was Dx on target knee: 2010  allergies and past medical history  Active Ambulatory Problems     Diagnosis Date Noted    Benign essential HTN     Primary osteoarthritis of knees, bilateral     Vitamin D deficiency     Bilateral carpal tunnel syndrome 05/08/2017    Mixed hyperlipidemia 05/08/2017    Prediabetes 07/30/2018    Trapezius muscle spasm 10/30/2018    Primary osteoarthritis of left knee 08/24/2021    Primary osteoarthritis of right knee 08/24/2021    Chronic pain of left knee 11/30/2023    Quadriceps weakness 11/30/2023     Resolved Ambulatory Problems     Diagnosis Date Noted    No Resolved Ambulatory Problems     Past Medical History:   Diagnosis Date    History of prediabetes     Hx of Fibroids         Surgical Hx:  Past Surgical History:   Procedure Laterality Date    Episiotomy (related to pregnancy)           OA Treatment Hx:  Did pt receive steroid injections (intra-articular) for knee OA within last 12 months?  No  Did the patient receive viscosupplementation within last 12 months? No  Is  there a hx of cryoanalgesia or radio-frequency ablation tx for the affected knee within last 12 months? No  Herbal therapy or supplements within last 12 months for OA knee pain? Yes (apple cider vinegar; Aspercreme)   Medical marijuana use within last 12 months for OA pain? No  Hx of acupuncture within last 12 months for OA pain? No  Knee brace use within last 12 months for OA pain? Yes  Physical therapy within last 12 months for OA knee pain? No  Did the patient receive any treatments (other than the above) for Knee OA within the last 12 months? Yes (Bilateral Toradol)     Prior and Current OA Medications/ Any Analgesic Medications used in the last three months prior to enrollment: (Per study sponsor list)  Patient states the following medications were taken:    Aleve

## 2024-01-25 NOTE — PROCEDURES
Procedures  Procedure Note Iovera:    DATE OF PROCEDURE:  01/25/2024    PREOPERATIVE DIAGNOSIS: left knee pain.     POSTOPERATIVE DIAGNOSIS: left knee pain.     PROCEDURE: Iovera treatment of anterior femoral cutaneous nerve, Lateral femoral cut nerve, and both branches of infrapatellar saphenous nerve using at least 4 different punctures to treat all 4 nerves. (cpt 64640 x4)    ATTENDING SURGEON: Mayur Rizzo M.D.   ASSISTANT: kisha miller    COMPLICATIONS: None.     IMPLANTS:  None    ESTIMATED BLOOD LOSS:  < 5cc    SPECIMENS REMOVED:  None    ANESTHESIA: Local lidocaine    INDICATIONS FOR PROCEDURE: This is a 58 y.o. female with longstanding knee pain. They have failed non operative management including injections.I discussed a new treatment therapy called Iovera, which is cryotherapy, to provide symptomatic relief along the sensory distribution of the infrapatellar tendon branch of the saphenous nerve  and AFCN. The patient elected to move forward with this  We did discuss the fact that this is a fairly novel procedure and there is very limited scientific data around this.  However, it FDA approved.  The patient was given patient information and literature to review prior to the procedure as well.  Based on this, the patient agreed to move forward with doing the procedure.      PROCEDURE:    The patient was placed supine on the exam table and the proximal medial aspect of the left tibia and anterior aspect of distal femur was prepped with sterile Betadine and alcohol.  A line was drawn extending approximately 5 cm medial to inferior pole of the patella distally to a point approximately 5 cm medial to the tibial tubercle.  A second line was drawn in a medial to lateral direction the width of the patella approximately 7 cm proximal to the patella. We then infiltrated the skin with lidocaine along both lines using a 25g needle. We then introduced the Iovera device along these lines and this device penetrated the skin,  creating cryotherapy to both branches of the infrapatellar saphenous nerve, a third treatment to the anterior femoral cutaneous nerve, and fourth LFCN. 7 punctures of the skin were made to treat the 2 branches of the ISN and another 7 punctures were made to treat the AFCN and LFCN. There were a total of 4 nerves treated with iovera. The patient tolerated the procedure well with no problems.

## 2024-01-25 NOTE — PROGRESS NOTES
Protocol: Innovations in Genicular Outcomes Registry (Raj)  Protocol#:Raj  IRB# 2021.156  Version Date: 10-Clement-2023  PI: Mayur Rizzo MD  Patient Initials: C.S.  (Study ID# [id number])       Treatment Visit     Research staff approached patient in private clinic room prior to OA injection. Patient awake, alert. Mood and affect appropriate to situation. Patient stated willingness to continue participation in above study. Patient states pain for target knee is 4 on pain scale. Manny entered the room to perform Iovera treatment to target knee (LEFT) with Dr. Rizzo oversight. Patient tolerated procedure well with no immediate complications. No analgesic medication prescribed after the treatment.     Research staff discussed the daily questionnaires that start tomorrow. Patient stated understanding and said she will complete the questionnaires as soon as the e-mail reminder is received. Patient reports post procedure pain of target knee as a 0 on pain scale. Patient instructed to call Dr. Rizzo or research team with any questions or concerns.     She is scheduled for LT TKA on Monday.

## 2024-01-25 NOTE — PROGRESS NOTES
Protocol: innovations in Genicular Outcomes Registry (Raj)  Protocol#: Raj  IRB#: 2021.156  Version Date: 10-Clement-2023  PI: Mayur Rizzo MD  Patient Initials: C.S.     Eligibility Note:      - Inclusion Criteria  1. Planned to receive treatment for knee OA pain including, but not limited to, knee injections, nerve blocking  procedures, or knee arthroplasty within 60 days of screening yes, scheduled for Iovera to Left on 1/25/2024 in preparation for TKA on Monday.  2. Able to understand the informed consent and the assessment questionnaires and have the ability to complete  them in the opinion of the investigator: yes  3. Have access to a smartphone or internet access with a computer/tablet to complete the questionnaires using  the registry application: yes  4. KL Score according to Dr. Rizzo: 4    7.4 Exclusion Criteria  1. Actively enrolled in an investigational trial that would preclude patients from receiving the sites standard  of care for knee OA pain or knee arthroplasty recovery protocol: no   2. Planning to have a surgery other than on the target knee: no

## 2024-01-25 NOTE — PROGRESS NOTES
Protocol: innovations in Genicular Outcomes Registry (Raj)  Protocol#: Raj  IRB#: 2021.156  Version Date: 10-Clement-2023  PI: Mayur Rizzo MD  Patient Initials: C.S.     Informed Consent Process:     Research coordinator met with patient, in private clinic room, to discuss above mentioned protocol. Patient is alert and oriented x 3. Mood and affect appropriate to the situation. Patient states that she is not participating in any other research studies. Patient states understanding about the diagnosis of osteoarthritis of the knee and of the procedure to being done on that knee.  Purpose of study reviewed with the patient.  Patient states understanding of this information.   Length of study and number of participants reviewed with patient; patient states understanding of the length of the study.   Study procedure discussed in detail with patient. Patient states understanding of this information.  Potential benefits of study along with costs and/or payment reimbursement per insurance discussed with patient; Patient states understanding that insurance will cover cost of all standard of care medications and procedures. Patient aware of $20 payment for qualifying visits with completed questionnaires.  Alternative treatment methods discussed with patient; Patient states understanding of this information.   Study related questions/compensation for injury, and whom to contact regarding rights as a research subject all reviewed with patient; Patient verbalizes understanding of this information.   Voluntary participation and withdrawal from research stressed to patient; patient states understanding that she may withdraw consent at any time without compromise to care.   Confidentiality and HIPAA discussed with patient. Patient verbalizes understanding of this information.      Patient states her willingness to participate in study; [employer] eligibility confirmed and [employer] link send to patient's e-mail address. Patient  instructed to read informed consent in its entirety, initial at the bottom of each page, then sign the last page via their smart device or computer and submit. Throughout consenting process, patient given several opportunities to ask questions; all questions addressed and answered to patient's satisfaction.      Patient signed eConsent and research team completed the staff portion. Patient completed all baseline/screening questionnaires prior to any procedures.    Patient given [employer]. Patient aware it may take 24-48 hrs for the funds to appear on the card balance.

## 2024-01-29 ENCOUNTER — RESEARCH ENCOUNTER (OUTPATIENT)
Dept: RESEARCH | Facility: HOSPITAL | Age: 59
End: 2024-01-29
Payer: COMMERCIAL

## 2024-01-29 ENCOUNTER — TELEPHONE (OUTPATIENT)
Dept: RESEARCH | Facility: HOSPITAL | Age: 59
End: 2024-01-29
Payer: COMMERCIAL

## 2024-01-29 ENCOUNTER — HOSPITAL ENCOUNTER (OUTPATIENT)
Facility: HOSPITAL | Age: 59
Discharge: HOME | End: 2024-01-30
Attending: ORTHOPAEDIC SURGERY | Admitting: ORTHOPAEDIC SURGERY
Payer: COMMERCIAL

## 2024-01-29 DIAGNOSIS — M17.12 PRIMARY OSTEOARTHRITIS OF LEFT KNEE: ICD-10-CM

## 2024-01-29 DIAGNOSIS — M17.11 PRIMARY OSTEOARTHRITIS OF RIGHT KNEE: Primary | ICD-10-CM

## 2024-01-29 DIAGNOSIS — M17.10 ARTHRITIS OF KNEE: ICD-10-CM

## 2024-01-29 LAB
APPEARANCE FLD: CLEAR
BODY FLD TYPE: NORMAL
COLOR FLD: YELLOW
LYMPHOCYTES NFR FLD MANUAL: 39 %
MESOTHL CELL NFR FLD MANUAL: 4 %
MONOS+MACROS NFR FLD MANUAL: 51 %
NEUTROPHILS NFR FLD MANUAL: 6 %
WBC # FLD: 273 /CU MM

## 2024-01-29 PROCEDURE — 63600175 PHARM REV CODE 636 W HCPCS: Performed by: STUDENT IN AN ORGANIZED HEALTH CARE EDUCATION/TRAINING PROGRAM

## 2024-01-29 PROCEDURE — D9220A PRA ANESTHESIA: Mod: CRNA,,, | Performed by: NURSE ANESTHETIST, CERTIFIED REGISTERED

## 2024-01-29 PROCEDURE — 71000015 HC POSTOP RECOV 1ST HR: Performed by: ORTHOPAEDIC SURGERY

## 2024-01-29 PROCEDURE — 89051 BODY FLUID CELL COUNT: CPT | Performed by: ORTHOPAEDIC SURGERY

## 2024-01-29 PROCEDURE — 25000003 PHARM REV CODE 250

## 2024-01-29 PROCEDURE — C1776 JOINT DEVICE (IMPLANTABLE): HCPCS | Performed by: ORTHOPAEDIC SURGERY

## 2024-01-29 PROCEDURE — C1713 ANCHOR/SCREW BN/BN,TIS/BN: HCPCS | Performed by: ORTHOPAEDIC SURGERY

## 2024-01-29 PROCEDURE — 63600175 PHARM REV CODE 636 W HCPCS: Mod: JZ,JG | Performed by: STUDENT IN AN ORGANIZED HEALTH CARE EDUCATION/TRAINING PROGRAM

## 2024-01-29 PROCEDURE — 71000039 HC RECOVERY, EACH ADD'L HOUR: Performed by: ORTHOPAEDIC SURGERY

## 2024-01-29 PROCEDURE — 36000710: Performed by: ORTHOPAEDIC SURGERY

## 2024-01-29 PROCEDURE — 64447 NJX AA&/STRD FEMORAL NRV IMG: CPT | Performed by: STUDENT IN AN ORGANIZED HEALTH CARE EDUCATION/TRAINING PROGRAM

## 2024-01-29 PROCEDURE — 63600175 PHARM REV CODE 636 W HCPCS: Performed by: NURSE ANESTHETIST, CERTIFIED REGISTERED

## 2024-01-29 PROCEDURE — 25000003 PHARM REV CODE 250: Performed by: NURSE ANESTHETIST, CERTIFIED REGISTERED

## 2024-01-29 PROCEDURE — 71000033 HC RECOVERY, INTIAL HOUR: Performed by: ORTHOPAEDIC SURGERY

## 2024-01-29 PROCEDURE — 25000003 PHARM REV CODE 250: Performed by: STUDENT IN AN ORGANIZED HEALTH CARE EDUCATION/TRAINING PROGRAM

## 2024-01-29 PROCEDURE — 36000711: Performed by: ORTHOPAEDIC SURGERY

## 2024-01-29 PROCEDURE — 27201423 OPTIME MED/SURG SUP & DEVICES STERILE SUPPLY: Performed by: ORTHOPAEDIC SURGERY

## 2024-01-29 PROCEDURE — 76942 ECHO GUIDE FOR BIOPSY: CPT | Performed by: STUDENT IN AN ORGANIZED HEALTH CARE EDUCATION/TRAINING PROGRAM

## 2024-01-29 PROCEDURE — 37000009 HC ANESTHESIA EA ADD 15 MINS: Performed by: ORTHOPAEDIC SURGERY

## 2024-01-29 PROCEDURE — A4247 BETADINE/IODINE SWABS/WIPES: HCPCS

## 2024-01-29 PROCEDURE — 37000008 HC ANESTHESIA 1ST 15 MINUTES: Performed by: ORTHOPAEDIC SURGERY

## 2024-01-29 PROCEDURE — 71000016 HC POSTOP RECOV ADDL HR: Performed by: ORTHOPAEDIC SURGERY

## 2024-01-29 PROCEDURE — 27447 TOTAL KNEE ARTHROPLASTY: CPT | Mod: 58,LT,, | Performed by: ORTHOPAEDIC SURGERY

## 2024-01-29 PROCEDURE — 63600175 PHARM REV CODE 636 W HCPCS: Mod: JG | Performed by: ORTHOPAEDIC SURGERY

## 2024-01-29 PROCEDURE — D9220A PRA ANESTHESIA: Mod: ANES,,, | Performed by: STUDENT IN AN ORGANIZED HEALTH CARE EDUCATION/TRAINING PROGRAM

## 2024-01-29 PROCEDURE — 25000003 PHARM REV CODE 250: Performed by: ORTHOPAEDIC SURGERY

## 2024-01-29 PROCEDURE — 63600175 PHARM REV CODE 636 W HCPCS: Performed by: ORTHOPAEDIC SURGERY

## 2024-01-29 PROCEDURE — 20985 CPTR-ASST DIR MS PX: CPT | Mod: ,,, | Performed by: ORTHOPAEDIC SURGERY

## 2024-01-29 DEVICE — COMPONENT TRABECULAR CR TIB 4: Type: IMPLANTABLE DEVICE | Site: KNEE | Status: FUNCTIONAL

## 2024-01-29 RX ORDER — DIPHENHYDRAMINE HCL 25 MG
25 CAPSULE ORAL EVERY 6 HOURS PRN
Status: ACTIVE | OUTPATIENT
Start: 2024-01-29

## 2024-01-29 RX ORDER — TRANEXAMIC ACID 100 MG/ML
INJECTION, SOLUTION INTRAVENOUS
Status: DISCONTINUED | OUTPATIENT
Start: 2024-01-29 | End: 2024-01-29 | Stop reason: HOSPADM

## 2024-01-29 RX ORDER — CEFAZOLIN SODIUM 2 G/50ML
2 SOLUTION INTRAVENOUS EVERY 8 HOURS
Status: COMPLETED | OUTPATIENT
Start: 2024-01-29 | End: 2024-01-29

## 2024-01-29 RX ORDER — SODIUM CHLORIDE, SODIUM LACTATE, POTASSIUM CHLORIDE, CALCIUM CHLORIDE 600; 310; 30; 20 MG/100ML; MG/100ML; MG/100ML; MG/100ML
INJECTION, SOLUTION INTRAVENOUS CONTINUOUS
Status: DISCONTINUED | OUTPATIENT
Start: 2024-01-29 | End: 2024-01-30 | Stop reason: HOSPADM

## 2024-01-29 RX ORDER — PROPOFOL 10 MG/ML
VIAL (ML) INTRAVENOUS
Status: DISCONTINUED | OUTPATIENT
Start: 2024-01-29 | End: 2024-01-29

## 2024-01-29 RX ORDER — MIDAZOLAM HYDROCHLORIDE 1 MG/ML
INJECTION, SOLUTION INTRAMUSCULAR; INTRAVENOUS
Status: DISCONTINUED | OUTPATIENT
Start: 2024-01-29 | End: 2024-01-29

## 2024-01-29 RX ORDER — IPRATROPIUM BROMIDE AND ALBUTEROL SULFATE 2.5; .5 MG/3ML; MG/3ML
3 SOLUTION RESPIRATORY (INHALATION) EVERY 4 HOURS PRN
Status: DISCONTINUED | OUTPATIENT
Start: 2024-01-29 | End: 2024-01-29 | Stop reason: HOSPADM

## 2024-01-29 RX ORDER — ASPIRIN 81 MG/1
81 TABLET ORAL 2 TIMES DAILY
Qty: 84 TABLET | Refills: 0 | Status: SHIPPED | OUTPATIENT
Start: 2024-01-29 | End: 2024-03-25

## 2024-01-29 RX ORDER — FENTANYL CITRATE 50 UG/ML
INJECTION, SOLUTION INTRAMUSCULAR; INTRAVENOUS
Status: DISCONTINUED | OUTPATIENT
Start: 2024-01-29 | End: 2024-01-29

## 2024-01-29 RX ORDER — BISACODYL 10 MG/1
10 SUPPOSITORY RECTAL 2 TIMES DAILY PRN
Status: DISPENSED | OUTPATIENT
Start: 2024-01-29

## 2024-01-29 RX ORDER — TRANEXAMIC ACID 650 MG/1
1950 TABLET ORAL ONCE
Status: COMPLETED | OUTPATIENT
Start: 2024-01-29 | End: 2024-01-29

## 2024-01-29 RX ORDER — FAMOTIDINE 20 MG/1
20 TABLET, FILM COATED ORAL 2 TIMES DAILY
Qty: 84 TABLET | Refills: 0 | Status: SHIPPED | OUTPATIENT
Start: 2024-01-29 | End: 2024-03-25

## 2024-01-29 RX ORDER — CEFAZOLIN SODIUM 2 G/50ML
2 SOLUTION INTRAVENOUS EVERY 8 HOURS
Status: DISPENSED | OUTPATIENT
Start: 2024-01-29 | End: 2024-01-30

## 2024-01-29 RX ORDER — POVIDONE-IODINE 10 %
SOLUTION, NON-ORAL TOPICAL
Status: DISCONTINUED | OUTPATIENT
Start: 2024-01-29 | End: 2024-01-29 | Stop reason: HOSPADM

## 2024-01-29 RX ORDER — SODIUM CHLORIDE 0.9 G/100ML
IRRIGANT IRRIGATION
Status: DISCONTINUED | OUTPATIENT
Start: 2024-01-29 | End: 2024-01-29 | Stop reason: HOSPADM

## 2024-01-29 RX ORDER — FAMOTIDINE 20 MG/1
20 TABLET, FILM COATED ORAL 2 TIMES DAILY
Status: DISPENSED | OUTPATIENT
Start: 2024-01-29

## 2024-01-29 RX ORDER — CEFAZOLIN SODIUM 1 G/3ML
INJECTION, POWDER, FOR SOLUTION INTRAMUSCULAR; INTRAVENOUS
Status: DISCONTINUED | OUTPATIENT
Start: 2024-01-29 | End: 2024-01-29

## 2024-01-29 RX ORDER — AMOXICILLIN 250 MG
1 CAPSULE ORAL 2 TIMES DAILY
Status: DISPENSED | OUTPATIENT
Start: 2024-01-29

## 2024-01-29 RX ORDER — ACETAMINOPHEN 325 MG/1
650 TABLET ORAL EVERY 6 HOURS
Status: DISPENSED | OUTPATIENT
Start: 2024-01-29

## 2024-01-29 RX ORDER — ONDANSETRON HYDROCHLORIDE 2 MG/ML
INJECTION, SOLUTION INTRAVENOUS
Status: DISCONTINUED | OUTPATIENT
Start: 2024-01-29 | End: 2024-01-29

## 2024-01-29 RX ORDER — EPHEDRINE SULFATE 50 MG/ML
INJECTION, SOLUTION INTRAVENOUS
Status: DISCONTINUED | OUTPATIENT
Start: 2024-01-29 | End: 2024-01-29

## 2024-01-29 RX ORDER — DEXAMETHASONE SODIUM PHOSPHATE 10 MG/ML
10 INJECTION INTRAMUSCULAR; INTRAVENOUS ONCE
Status: ACTIVE | OUTPATIENT
Start: 2024-01-29

## 2024-01-29 RX ORDER — ONDANSETRON HYDROCHLORIDE 2 MG/ML
4 INJECTION, SOLUTION INTRAVENOUS EVERY 12 HOURS PRN
Status: ACTIVE | OUTPATIENT
Start: 2024-01-29

## 2024-01-29 RX ORDER — HYDROMORPHONE HYDROCHLORIDE 2 MG/ML
0.5 INJECTION, SOLUTION INTRAMUSCULAR; INTRAVENOUS; SUBCUTANEOUS EVERY 10 MIN PRN
Status: DISCONTINUED | OUTPATIENT
Start: 2024-01-29 | End: 2024-01-29 | Stop reason: HOSPADM

## 2024-01-29 RX ORDER — TRANEXAMIC ACID 650 MG/1
650 TABLET ORAL 2 TIMES DAILY
Qty: 28 TABLET | Refills: 0 | Status: SHIPPED | OUTPATIENT
Start: 2024-01-29 | End: 2024-02-26

## 2024-01-29 RX ORDER — CEPHALEXIN 500 MG/1
500 CAPSULE ORAL EVERY 6 HOURS
Qty: 4 CAPSULE | Refills: 0 | Status: SHIPPED | OUTPATIENT
Start: 2024-01-29 | End: 2024-02-13

## 2024-01-29 RX ORDER — CELECOXIB 100 MG/1
200 CAPSULE ORAL 2 TIMES DAILY
Status: DISPENSED | OUTPATIENT
Start: 2024-01-29

## 2024-01-29 RX ORDER — DICLOFENAC SODIUM 75 MG/1
75 TABLET, DELAYED RELEASE ORAL 2 TIMES DAILY
Qty: 84 TABLET | Refills: 0 | Status: SHIPPED | OUTPATIENT
Start: 2024-01-29 | End: 2024-03-25

## 2024-01-29 RX ORDER — PROPOFOL 10 MG/ML
VIAL (ML) INTRAVENOUS CONTINUOUS PRN
Status: DISCONTINUED | OUTPATIENT
Start: 2024-01-29 | End: 2024-01-29

## 2024-01-29 RX ORDER — ONDANSETRON HYDROCHLORIDE 2 MG/ML
4 INJECTION, SOLUTION INTRAVENOUS EVERY 8 HOURS PRN
Status: ACTIVE | OUTPATIENT
Start: 2024-01-29

## 2024-01-29 RX ORDER — BUPIVACAINE HYDROCHLORIDE 2.5 MG/ML
INJECTION, SOLUTION EPIDURAL; INFILTRATION; INTRACAUDAL
Status: DISCONTINUED | OUTPATIENT
Start: 2024-01-29 | End: 2024-01-29

## 2024-01-29 RX ORDER — BUPIVACAINE HYDROCHLORIDE 2.5 MG/ML
INJECTION, SOLUTION INFILTRATION; PERINEURAL
Status: DISCONTINUED | OUTPATIENT
Start: 2024-01-29 | End: 2024-01-29 | Stop reason: HOSPADM

## 2024-01-29 RX ORDER — TRANEXAMIC ACID 10 MG/ML
1000 INJECTION, SOLUTION INTRAVENOUS EVERY 6 HOURS
Status: DISPENSED | OUTPATIENT
Start: 2024-01-29 | End: 2024-01-30

## 2024-01-29 RX ORDER — KETAMINE HCL IN 0.9 % NACL 50 MG/5 ML
SYRINGE (ML) INTRAVENOUS
Status: DISCONTINUED | OUTPATIENT
Start: 2024-01-29 | End: 2024-01-29

## 2024-01-29 RX ORDER — OXYCODONE HYDROCHLORIDE 5 MG/1
5 TABLET ORAL
Status: DISCONTINUED | OUTPATIENT
Start: 2024-01-29 | End: 2024-01-29 | Stop reason: HOSPADM

## 2024-01-29 RX ORDER — PROCHLORPERAZINE EDISYLATE 5 MG/ML
5 INJECTION INTRAMUSCULAR; INTRAVENOUS EVERY 30 MIN PRN
Status: DISCONTINUED | OUTPATIENT
Start: 2024-01-29 | End: 2024-01-29 | Stop reason: HOSPADM

## 2024-01-29 RX ORDER — ACETAMINOPHEN 325 MG/1
325 TABLET ORAL EVERY 4 HOURS
Qty: 84 TABLET | Refills: 0 | Status: SHIPPED | OUTPATIENT
Start: 2024-01-29 | End: 2024-02-26

## 2024-01-29 RX ORDER — ACETAMINOPHEN 500 MG
1000 TABLET ORAL ONCE
Status: COMPLETED | OUTPATIENT
Start: 2024-01-29 | End: 2024-01-29

## 2024-01-29 RX ORDER — NAPROXEN SODIUM 220 MG/1
81 TABLET, FILM COATED ORAL 2 TIMES DAILY
Status: DISPENSED | OUTPATIENT
Start: 2024-01-29 | End: 2024-03-11

## 2024-01-29 RX ORDER — OXYCODONE HYDROCHLORIDE 5 MG/1
10 TABLET ORAL EVERY 4 HOURS PRN
Status: DISCONTINUED | OUTPATIENT
Start: 2024-01-29 | End: 2024-01-29 | Stop reason: HOSPADM

## 2024-01-29 RX ORDER — TRANEXAMIC ACID 100 MG/ML
INJECTION, SOLUTION INTRAVENOUS
Status: DISCONTINUED | OUTPATIENT
Start: 2024-01-29 | End: 2024-01-29

## 2024-01-29 RX ORDER — BUPIVACAINE HYDROCHLORIDE 7.5 MG/ML
INJECTION, SOLUTION INTRASPINAL
Status: DISCONTINUED | OUTPATIENT
Start: 2024-01-29 | End: 2024-01-29

## 2024-01-29 RX ORDER — CELECOXIB 100 MG/1
400 CAPSULE ORAL ONCE
Status: COMPLETED | OUTPATIENT
Start: 2024-01-29 | End: 2024-01-29

## 2024-01-29 RX ORDER — TALC
6 POWDER (GRAM) TOPICAL NIGHTLY PRN
Status: DISPENSED | OUTPATIENT
Start: 2024-01-29

## 2024-01-29 RX ORDER — LIDOCAINE HYDROCHLORIDE 10 MG/ML
1 INJECTION, SOLUTION EPIDURAL; INFILTRATION; INTRACAUDAL; PERINEURAL ONCE
Status: DISCONTINUED | OUTPATIENT
Start: 2024-01-29 | End: 2024-01-29 | Stop reason: HOSPADM

## 2024-01-29 RX ORDER — PREGABALIN 75 MG/1
150 CAPSULE ORAL ONCE
Status: COMPLETED | OUTPATIENT
Start: 2024-01-29 | End: 2024-01-29

## 2024-01-29 RX ORDER — DEXAMETHASONE SODIUM PHOSPHATE 4 MG/ML
INJECTION, SOLUTION INTRA-ARTICULAR; INTRALESIONAL; INTRAMUSCULAR; INTRAVENOUS; SOFT TISSUE
Status: DISCONTINUED | OUTPATIENT
Start: 2024-01-29 | End: 2024-01-29

## 2024-01-29 RX ORDER — PREGABALIN 75 MG/1
75 CAPSULE ORAL 2 TIMES DAILY
Status: DISPENSED | OUTPATIENT
Start: 2024-01-29

## 2024-01-29 RX ORDER — CEFAZOLIN SODIUM 2 G/50ML
2 SOLUTION INTRAVENOUS ONCE
Status: ACTIVE | OUTPATIENT
Start: 2024-01-29

## 2024-01-29 RX ADMIN — EPHEDRINE SULFATE 5 MG: 50 INJECTION, SOLUTION INTRAMUSCULAR; INTRAVENOUS; SUBCUTANEOUS at 08:01

## 2024-01-29 RX ADMIN — SODIUM CHLORIDE: 0.9 INJECTION, SOLUTION INTRAVENOUS at 07:01

## 2024-01-29 RX ADMIN — PROCHLORPERAZINE EDISYLATE 5 MG: 5 INJECTION INTRAMUSCULAR; INTRAVENOUS at 10:01

## 2024-01-29 RX ADMIN — BUPIVACAINE HYDROCHLORIDE 15 ML: 2.5 INJECTION, SOLUTION EPIDURAL; INFILTRATION; INTRACAUDAL at 09:01

## 2024-01-29 RX ADMIN — BUPIVACAINE HYDROCHLORIDE 10 ML: 2.5 INJECTION, SOLUTION EPIDURAL; INFILTRATION; INTRACAUDAL; PERINEURAL at 09:01

## 2024-01-29 RX ADMIN — PREGABALIN 150 MG: 75 CAPSULE ORAL at 06:01

## 2024-01-29 RX ADMIN — ACETAMINOPHEN 650 MG: 325 TABLET ORAL at 06:01

## 2024-01-29 RX ADMIN — MIDAZOLAM 2 MG: 1 INJECTION INTRAMUSCULAR; INTRAVENOUS at 07:01

## 2024-01-29 RX ADMIN — FENTANYL CITRATE 50 MCG: 50 INJECTION, SOLUTION INTRAMUSCULAR; INTRAVENOUS at 07:01

## 2024-01-29 RX ADMIN — CELECOXIB 400 MG: 100 CAPSULE ORAL at 06:01

## 2024-01-29 RX ADMIN — CEFAZOLIN SODIUM 2 G: 2 SOLUTION INTRAVENOUS at 03:01

## 2024-01-29 RX ADMIN — HYDROMORPHONE HYDROCHLORIDE 0.5 MG: 2 INJECTION, SOLUTION INTRAMUSCULAR; INTRAVENOUS; SUBCUTANEOUS at 10:01

## 2024-01-29 RX ADMIN — BUPIVACAINE HYDROCHLORIDE IN DEXTROSE 1.5 ML: 7.5 INJECTION, SOLUTION SUBARACHNOID at 07:01

## 2024-01-29 RX ADMIN — ACETAMINOPHEN 1000 MG: 500 TABLET ORAL at 06:01

## 2024-01-29 RX ADMIN — CELECOXIB 200 MG: 100 CAPSULE ORAL at 09:01

## 2024-01-29 RX ADMIN — Medication 40 MG: at 08:01

## 2024-01-29 RX ADMIN — TRANEXAMIC ACID 1000 MG: 10 INJECTION, SOLUTION INTRAVENOUS at 06:01

## 2024-01-29 RX ADMIN — FAMOTIDINE 20 MG: 20 TABLET ORAL at 09:01

## 2024-01-29 RX ADMIN — EPHEDRINE SULFATE 10 MG: 50 INJECTION, SOLUTION INTRAMUSCULAR; INTRAVENOUS; SUBCUTANEOUS at 08:01

## 2024-01-29 RX ADMIN — DEXAMETHASONE SODIUM PHOSPHATE 4 MG: 4 INJECTION, SOLUTION INTRA-ARTICULAR; INTRALESIONAL; INTRAMUSCULAR; INTRAVENOUS; SOFT TISSUE at 07:01

## 2024-01-29 RX ADMIN — PROPOFOL 80 MCG/KG/MIN: 10 INJECTION, EMULSION INTRAVENOUS at 07:01

## 2024-01-29 RX ADMIN — CEFAZOLIN 2 G: 330 INJECTION, POWDER, FOR SOLUTION INTRAMUSCULAR; INTRAVENOUS at 07:01

## 2024-01-29 RX ADMIN — CEFAZOLIN SODIUM 2 G: 2 SOLUTION INTRAVENOUS at 10:01

## 2024-01-29 RX ADMIN — TRANEXAMIC ACID 1950 MG: 650 TABLET ORAL at 06:01

## 2024-01-29 RX ADMIN — Medication 10 MG: at 07:01

## 2024-01-29 RX ADMIN — TRANEXAMIC ACID 1000 MG: 100 INJECTION, SOLUTION INTRAVENOUS at 07:01

## 2024-01-29 RX ADMIN — ONDANSETRON 4 MG: 2 INJECTION, SOLUTION INTRAMUSCULAR; INTRAVENOUS at 07:01

## 2024-01-29 RX ADMIN — ASPIRIN 81 MG CHEWABLE TABLET 81 MG: 81 TABLET CHEWABLE at 09:01

## 2024-01-29 RX ADMIN — OXYCODONE HYDROCHLORIDE 10 MG: 5 TABLET ORAL at 10:01

## 2024-01-29 RX ADMIN — PROPOFOL 80 MG: 10 INJECTION, EMULSION INTRAVENOUS at 07:01

## 2024-01-29 RX ADMIN — EPHEDRINE SULFATE 10 MG: 50 INJECTION, SOLUTION INTRAMUSCULAR; INTRAVENOUS; SUBCUTANEOUS at 09:01

## 2024-01-29 RX ADMIN — DOCUSATE SODIUM AND SENNOSIDES 1 TABLET: 8.6; 5 TABLET, FILM COATED ORAL at 09:01

## 2024-01-29 NOTE — H&P
[facility] H&P  The patient has been personally evaluated by me. They verbally confirmed they will undergo L TKA with Dr. Rizzo. There have been no changes in patient's health since the last time they were seen.      Pratibha Cohen MD, PGY-3  [facility]         [facility] NOTE  Pre-Op Visit        SUBJECTIVE:      Patient: Leah Minaya is a 58 y.o. female with a past medical history of hypertension who presents for preop evaluation.     Chief Compliant:       Chief Complaint   Patient presents with    Pre-op Exam         History of Present Illness:  Presents for pre-op evaluation. Scheduled for a right total knee arthroplasty January 29, 2024 by Dr. Mayur Rizzo.  The patient states that she takes hydrochlorothiazide 25 mg daily in addition to amlodipine 10 mg daily for blood pressure control.  She states that normally her blood pressure is elevated whenever she comes to the doctor's office, however today it appears well-controlled.  Per chart review, the patient had an EKG done on November 30, 2023 that overall showed no acute changes from a prior EKG done in 2011.  She endorses no history of cardiac or respiratory abnormalities in addition to this she denies any thyroid abnormalities.  DASI was performed results are below.  She does endorse some generalized fatigue that has been going on for months, recent lab work was unremarkable for both the CBC and CMP and the patient filled out a PHQ-9 in office.        Review of Systems   Constitutional:  Negative for chills and fever.   Respiratory:  Negative for sputum production.    Cardiovascular:  Negative for chest pain.   Gastrointestinal:  Negative for nausea and vomiting.            OBJECTIVE:      Vital Signs (Most Recent)  Vitals       Vitals:     01/10/24 1023   BP: 135/87   BP Location: Left arm   Patient Position: Sitting   BP Method: Medium (Automatic)   Pulse: 78   SpO2: 98%   Weight: 92.2 kg (203 lb 4.2 oz)   Height: 5'  "5" (1.651 m)         BMI: Body mass index is 33.82 kg/m².      Physical Exam:  Physical Exam  Constitutional:       General: She is not in acute distress.  HENT:      Head: Normocephalic.      Right Ear: External ear normal.      Left Ear: External ear normal.      Nose: Nose normal. No congestion or rhinorrhea.      Mouth/Throat:      Mouth: Mucous membranes are moist.      Pharynx: Oropharynx is clear. No oropharyngeal exudate.   Eyes:      Extraocular Movements: Extraocular movements intact.   Cardiovascular:      Pulses: Normal pulses.      Heart sounds: Normal heart sounds. No murmur heard.  Pulmonary:      Effort: Pulmonary effort is normal. No respiratory distress.      Breath sounds: Normal breath sounds. No wheezing or rales.   Musculoskeletal:         General: Normal range of motion.      Cervical back: Normal range of motion.      Comments: Patient walks with cane  Slight decreased strength, proximally 4+/5 at the bilateral knee joints  Strength 5/5 at the elbows and shoulders   Skin:     General: Skin is warm and dry.   Neurological:      Mental Status: She is alert.            ASSESSMENT:   Leah Minaya is a 58 y.o. female who presents to clinic to for preoperative evaluation.  In regard to the patient's blood pressure, it appears well-controlled at least at this clinic visit.  In regard to her fatigue, her recent cardiac and metabolic workup makes it seem less suspicious for a more sinister etiology.  Her PHQ-9 was also unremarkable making psychological factors a less likely etiology.  Given the patient's functional status her fatigue may be secondary to deconditioning, however appropriate precautions should be had with any procedure.     1. Pre-operative clearance          PLAN:   Pre-operative evaluation with risk assessment              -           Scheduled for Total Knee Arthroplasty on 01/29/24 by Dr. Rizzo.  -           DASI score: 58.2 w/ Functional Capacity in METS: 9.89 (>4) with a revised " cardiac risk index of 0.               -           Patient is not on antiplatelets/anticoagulation.              -           Patient does not know have a history of blood dyscrasias or previous reaction to anesthesia              -           Patient  is a NEVER SMOKER.  -           Patient does have a prior h/o HTN.  -           Patient does not have a prior h/o HLD/CAD w/ either MI or CVA. .  -           Patient does not have a prior h/o DM. Last A1C: 5.3% on 11/30/23  -           Patient does not have a prior h/o thyroid disease.   -           Patient does not have a prior h/o COPD/Asthma/NGUYEN/OHS. does not use CPAP. does not have changes from baseline function.   -           Patient does not have a prior h/o HF.  -           BMI: Body mass index is 33.82 kg/m².              -           Patient is medically optimized with a low to moderate risk to proceed with (per ACC/AHA mehul-operative guidelines) a moderate risk surgery.              -           Please call our office for any additional questions or concerns.        Pre-operative clearance           Provided patient with anticipatory guidance and return precautions. Treatment plan discussed with patient, all questions answered, and patient acknowledged understanding and verbal agreement.        Case discussed with Dr. SHANNA Levi     ________________________  Anoop Mathew Jr, MD  [facility] Family Medicine PGY-1

## 2024-01-29 NOTE — TELEPHONE ENCOUNTER
Raj Study  Study ID#[id number]    Left voicemail reminding patient to complete Day 3 and 4 of questionnaires.

## 2024-01-29 NOTE — OP NOTE
Procedure Note Nadine Monoblock TKA:      DATE OF PROCEDURE:  1/29/2024    PREOPERATIVE DIAGNOSIS: left knee bone-on-bone osteoarthritis.     POSTOPERATIVE DIAGNOSIS: same    PROCEDURE: Computer-assisted left total knee arthroplasty with unresurfaced patella.     ATTENDING SURGEON: Mayur Rizzo M.D.   ASSISTANT:     Risk Adjustment: Please see media tab for any additional diagnoses faxed from my office related to theTKA.    COMPLICATIONS: None.     IMPLANTS:   1. Nadine NexGen trabecular metal monoblock tibial tray size 4 and 10 mm height .   2. Nadine Persona femoral component narrow Size 8  left.     INDICATIONS FOR PROCEDURE: This is a 58 y.o. female with longstanding knee pain. They have failed nonoperative management including injections. Risks and benefits of total knee arthroplasty were explained to the patient. The patient agreed to move forward with total knee arthroplasty. We also discussed the fact that no resurfacing the patella could lead to anterior knee pain requiring additional surgery in the future.     FINDINGS: The patient had a complete articular cartilage loss down to subchondral bone throughout the knee.      PROCEDURE IN DETAIL: The patient was then brought to the Operating Room and spinal anesthesia started without any difficulties. The patient was placed supine on the operative table.  Knee was then prepped and draped in sterile fashion. Prior to incision, proper site and procedure as well as antibiotic administration was verified. AFCN and ISN nerves were then injected with marcaine. Anterior midline incision was created overlying center patella proximally to the medial border of the tibial tubercle distally. Skin, subcutaneous fat and deep fascial layer were sharply incised until we came to extensor mechanism retinaculum. Flap was then elevated medially to VMO and laterally to lateral border of patella. Knee was then aspirated and synovial fluid sent for cell count.  Medial parapatellar arthrotomy was injected with Marcaine and a medial parapatellar arthrotomy was then created. Synovium overlying the anterolateral distal femur was then excised as well as the infrapatellar tendon fat pad. Sleeve of soft tissue was elevated off the proximal medial tibia. Periphery of the patella was denvervated using bovie cautery, Hohmann retractors then placed medially and laterally along the distal femur to protect the collateral ligaments. ACL and anterior horn of lateral meniscus were then transected. We then pinned our navigation tracker on to distal femur and then performed our anatomy survey for the femur. We placed distal femoral cutting guide such that we were perpendicular to mechanical axis, aiming for about 1 degree of flexion and about 9 mm of bony resection. Distal femur was then resected. Next, we placed AP sizing guide on distal femur and measured for a size 8 femoral component. We then drilled 2 pin holes in 3 degrees of external rotation relative to posterior condylar axis. Anteroposterior condylar bone was then resected. Resected posterior femoral bone measured approximately 3 mm in difference with the lateral piece thinner than the medial piece. Next, we subluxed the tibia forward and resected medial and lateral menisci. We then pinned our navigation tracker on to the proximal tibia and then performed our anatomy survey for tibia. We placed our proximal tibial cutting guide such that we were perpendicular to the mechanical axis, aiming for about 1 to 2 mm of bony resection medially  and about  6 degrees posterior slope. Proximal tibial bone was then resected and detached from posterior soft tissues using Bovie cautery. Next, with knee at 90 degrees, we placed a laminar  in lateral compartment and resected the ACL as well as small osteophytes posteriorly along the femur. We then injected the posterior capsule with marcaine. We then assessed our gaps using a 10 mm spacer  block. With the 10 mm spacer block, the knee came out to full extension with nice stability with varus and valgus stress, and nice symmetry between flexion and extension. We assessed our tibial cut and our alignment maureen fell within the center of the ankle. Once we were happy with soft tissue sleeves and bony cuts, we then placed tibial protector on the proximal tibia and we then created our chamfer cuts. We then placed our femoral trial. This was lateralized as much as possible and anchored using the lug hole screws.  Next, we subluxed the tibia forward, and sized our proximal tibia for a size 4 baseplate, the center of which was rotated such that it was in line with medial third tibial tubercle. This was then pinned in place. We then trialed using a 10 trial polyethylene. With 10 mm polyethylene, the knee came out to full extension. We had nice stability with varus and valgus stress and nice symmetry between flexion and extension. Patella tracked centrally within trochlear groove. Once we were happy with all our trial components, we then removed all our trial components, except the tibial baseplate. We then drilled our trabecular metal peg holes. We then drilled the sclerotic bone along the tibia using a long drill bit as well. We then placed the knee in extension and examined the posterior aspect of knee for bleeding. We then prepared our bony surfaces for implantation using pulse lavage antibiotic saline. Femoral component was then impacted into palce. We then protected the femoral component with a lap pad and subluxed the tibia forward. we then subluxed the tibia forward and impacted the monoblock tibial component into place. We then reexamined our gaps, stability and tracking; all of which remained unchanged. We then copiously irrigated the knee with pulse lavage betadine saline. We then closed our medial parapatellar arthrotomy with a running #2 barbed suture,  subcutaneous deep fascial layer was closed with  simple interrupted #1 vicryl suture, subcutaneous skin with 2-0 Vicryl and incision with silver water proof dressing. The patient was then transferred to Recovery Room bed in stable condition.

## 2024-01-29 NOTE — ANESTHESIA PROCEDURE NOTES
Left Adductor Canal Peripheral Block    Patient location during procedure: post-op   Block not for primary anesthetic.  Reason for block: at surgeon's request and post-op pain management   Post-op Pain Location: Left knee   Start time: 1/29/2024 9:22 AM  Timeout: 1/29/2024 9:20 AM   End time: 1/29/2024 9:30 AM    Staffing  Authorizing Provider: Sudhakar Amos MD  Performing Provider: Sudhakar Amos MD    Staffing  Performed by: Sudhakar Amos MD  Authorized by: Sudhakar Amos MD    Preanesthetic Checklist  Completed: patient identified, IV checked, site marked, risks and benefits discussed, surgical consent, monitors and equipment checked, pre-op evaluation and timeout performed  Peripheral Block  Patient position: supine  Prep: ChloraPrep  Patient monitoring: heart rate, cardiac monitor, continuous pulse ox, continuous capnometry and frequent blood pressure checks  Block type: adductor canal  Laterality: left  Injection technique: single shot  Needle  Needle type: Stimuplex   Needle gauge: 20 G  Needle length: 4 in  Needle localization: anatomical landmarks and ultrasound guidance   -ultrasound image captured on disc.  Assessment  Injection assessment: negative aspiration, negative parasthesia and local visualized surrounding nerve  Paresthesia pain: none  Heart rate change: no  Slow fractionated injection: yes    Medications:    Medications: bupivacaine (pf) (MARCAINE) injection 0.25% - Perineural   10 mL - 1/29/2024 9:24:00 AM    Additional Notes  VSS.  PACU RN monitoring vitals throughout procedure.  Patient tolerated procedure well.    With 10 mL liposomal bupivacaine 0.25%

## 2024-01-29 NOTE — ANESTHESIA PROCEDURE NOTES
Left iPACK Peripheral Block    Patient location during procedure: post-op   Block not for primary anesthetic.  Reason for block: at surgeon's request and post-op pain management   Post-op Pain Location: Left knee   Start time: 1/29/2024 9:22 AM  Timeout: 1/29/2024 9:20 AM   End time: 1/29/2024 9:30 AM    Staffing  Authorizing Provider: Sudhakar Amos MD  Performing Provider: Sudhakar Amos MD    Staffing  Performed by: Sudhakar Amos MD  Authorized by: Sudhakar Amos MD    Preanesthetic Checklist  Completed: patient identified, IV checked, site marked, risks and benefits discussed, surgical consent, monitors and equipment checked, pre-op evaluation and timeout performed  Peripheral Block  Patient position: supine  Prep: ChloraPrep  Patient monitoring: heart rate, cardiac monitor, continuous pulse ox, continuous capnometry and frequent blood pressure checks  Block type: I PACK  Laterality: left  Injection technique: single shot  Needle  Needle type: Stimuplex   Needle gauge: 20 G  Needle length: 4 in  Needle localization: anatomical landmarks and ultrasound guidance   -ultrasound image captured on disc.  Assessment  Injection assessment: negative aspiration, negative parasthesia and local visualized surrounding nerve  Paresthesia pain: none  Heart rate change: no  Slow fractionated injection: yes    Medications:    Medications: bupivacaine (pf) (MARCAINE) injection 0.25% - Perineural   15 mL - 1/29/2024 9:29:00 AM    Additional Notes  VSS.  PACU RN monitoring vitals throughout procedure.  Patient tolerated procedure well.    With epi 1:200k

## 2024-01-29 NOTE — TRANSFER OF CARE
"Anesthesia Transfer of Care Note    Patient: Leah Minaya    Procedure(s) Performed: Procedure(s) (LRB):  ARTHROPLASTY, KNEE, TOTAL monoblock (Left)    Patient location: PACU    Transport from OR: Transported from OR on 6-10 L/min O2 by face mask with adequate spontaneous ventilation    Post pain: adequate analgesia    Post assessment: no apparent anesthetic complications    Post vital signs: stable    Level of consciousness: sedated    Nausea/Vomiting: no nausea/vomiting    Complications: none    Transfer of care protocol was followed    Last vitals: Visit Vitals  BP (!) 188/80 (BP Location: Right arm, Patient Position: Lying)   Pulse 60   Temp 36.7 °C (98.1 °F) (Tympanic)   Resp 16   Ht 5' 4" (1.626 m)   Wt 92.1 kg (203 lb)   LMP 10/07/2018   SpO2 100%   Breastfeeding No   BMI 34.84 kg/m²     "

## 2024-01-29 NOTE — PLAN OF CARE
Patient still sleepy and waiting on bed.  Reported out to Jessica KILGORE who will assume care of patient until she receives a room

## 2024-01-29 NOTE — ANESTHESIA PROCEDURE NOTES
Intubation    Date/Time: 1/29/2024 8:14 AM    Performed by: Vikas Galvan CRNA  Authorized by: Sudhakar Amos MD    Intubation:     Mask Ventilation:  Easy mask    Attempts:  1    Attempted By:  CRNA    Difficult Airway Encountered?: No      Complications:  None    Airway Device:  Supraglottic airway/LMA    Airway Device Size:  3.5    Style/Cuff Inflation:  Cuffed    Secured at:  The lips    Placement Verified By:  Capnometry    Complicating Factors:  None    Findings Post-Intubation:  BS equal bilateral

## 2024-01-29 NOTE — PT/OT/SLP PROGRESS
Physical Therapy      Patient Name:  Leah Minaya   MRN:  [id number]    Patient not seen today secondary to lethargy post op. Attempted eval x 2. One attempt at 1338 and OT with 2nd attempt at 1435. Pt sleeping and unable to be awoken post operatively.  Will follow-up as time allows.

## 2024-01-29 NOTE — PLAN OF CARE
Patient still very sleepy, patient not waking up in order to do PT or even take meds, will continue to monitor

## 2024-01-29 NOTE — PLAN OF CARE
Patient has met PACU discharge criteria, VSS on room air, Left adductor canal block performed per SHANNA Dougherty MD and pain well controlled. Family updated by text message. Post op xray taken in PACU. Tolerated apple juice and ice chips.  Released from PACU by Anesthesia MD.

## 2024-01-29 NOTE — PROGRESS NOTES
Protocol: [employer] Outcomes Registry (Raj)  Protocol#:Raj  IRB# 2021.156  Version Date: 10-Clement-2023  PI: Mayur Rizzo MD  Patient Initials: FACUNDO.  (Study ID# [id number])         2nd Treatment Visit - TKA Surgery     Patient admitted to hospital-based outpatient department (HOPD) at [facility] for Total Knee Surgery to target knee (left) under the direction of Dr. Rizzo.     Patient's pre-op pain score was 0.     Pre-op medications: acetaminophen; pregabalin; celecoxib; tranexamic acid  Type(s) of anesthesia used: spinal  Was a tourniquet used? Yes  Nerve block type: Adductor canal block  Local Anaesthetic Field block: iPACK  Intra-op medications: Marcaine  Post-op medications: hydromorphone HCL/PF; oxycodone HCL; prochlorperazine edisylate; cefazolin sodium/dextrose; acetaminophen; tranexamic acid in NaCl  American Society of Anesthesiologists (ASA) Physical Status: II     Discharged to Secondary recovery room and the to the floor post surgery for recovery. Patient stated overnight due to still being too sleepy from anesthesia to be discharged.     Discharged from hospital on 1/30/2024 at 3:13pm to home.     Physical Therapy scheduled to begin on 1/30/2024.      Medications prescribed at time of discharge are:     Medication Name Does (with units) Frequency Number of pills prescribed Refills (if yes, how many)   acetaminophen 325mg scheduled 84 no   aspirin (ECOTRIN) 81mg scheduled 84 no   cephALEXin (KEFLEX) 500mg scheduled 4 no   diclofenac (VOLTAREN) 75mg scheduled 84 no   famotidine (PEPCID) 20mg scheduled 84 no   tranexamic acid (LYSTEDA) 650mg scheduled 28 no        Patient was called by  and reminded about study questionnaires that start of 1/30/2024.     No adverse events reported.    CPT Code: 38215  ICD Code:  M17.12

## 2024-01-29 NOTE — PT/OT/SLP PROGRESS
Occupational Therapy evaluation attempt      Patient Name:  Leah Minaya   MRN:  [id number]    Patient not seen today secondary to Other (Comment) (x2 visit attempts (13:38; 14:42); patient very sleepy with family at bedside; snoring/ difficulty to arouse from sleep; RN aware). Will follow-up when appropriate. Unsafe to attempt occupational therapy evaluation at this time.    1/29/2024

## 2024-01-29 NOTE — PT/OT/SLP PROGRESS
Physical Therapy      Patient Name:  Leah Minaya   MRN:  [id number]    Attempted PT evaluation for the 3rd time at 1550. Pt too sleepy/lethargic for evaluation despite max vc's and tactile stimuli. Pt with difficulty maintaining eye opening.  Will follow-up as time allows.

## 2024-01-29 NOTE — ANESTHESIA PROCEDURE NOTES
Spinal    Diagnosis: Left knee osteoarthritis  Patient location during procedure: OR  Start time: 1/29/2024 7:15 AM  Timeout: 1/29/2024 7:11 AM  End time: 1/29/2024 7:27 AM    Staffing  Authorizing Provider: Sudhakar Amos MD  Performing Provider: Sudhakar Amos MD    Staffing  Performed by: Sudhakar Amos MD  Authorized by: Sudhakar Amos MD    Preanesthetic Checklist  Completed: patient identified, IV checked, site marked, risks and benefits discussed, surgical consent, monitors and equipment checked, pre-op evaluation and timeout performed  Spinal Block  Patient position: sitting  Prep: ChloraPrep  Patient monitoring: heart rate, frequent blood pressure checks and continuous pulse ox  Approach: midline  Location: L3-4  Injection technique: single shot  CSF Fluid: clear free-flowing CSF  Needle  Needle type: pencil-tip   Needle gauge: 24 G  Needle length: 3.5 in  Additional Documentation: negative aspiration for heme and no paresthesia on injection  Needle localization: anatomical landmarks  Assessment  Sensory level: T8   Dermatomal levels determined by pinch or prick  Ease of block: moderate  Patient's tolerance of the procedure: comfortable throughout block  Additional Notes      Transient paraesthesia upon dural puncture, but CSF flowing.  No complaints during injection.

## 2024-01-30 ENCOUNTER — TELEPHONE (OUTPATIENT)
Dept: RESEARCH | Facility: HOSPITAL | Age: 59
End: 2024-01-30
Payer: COMMERCIAL

## 2024-01-30 ENCOUNTER — PATIENT MESSAGE (OUTPATIENT)
Dept: ORTHOPEDICS | Facility: CLINIC | Age: 59
End: 2024-01-30
Payer: COMMERCIAL

## 2024-01-30 VITALS
BODY MASS INDEX: 36.13 KG/M2 | HEIGHT: 64 IN | DIASTOLIC BLOOD PRESSURE: 63 MMHG | RESPIRATION RATE: 18 BRPM | SYSTOLIC BLOOD PRESSURE: 137 MMHG | WEIGHT: 211.63 LBS | TEMPERATURE: 98 F | HEART RATE: 61 BPM | OXYGEN SATURATION: 98 %

## 2024-01-30 PROCEDURE — 97165 OT EVAL LOW COMPLEX 30 MIN: CPT

## 2024-01-30 PROCEDURE — 97530 THERAPEUTIC ACTIVITIES: CPT

## 2024-01-30 PROCEDURE — 97162 PT EVAL MOD COMPLEX 30 MIN: CPT

## 2024-01-30 PROCEDURE — 97535 SELF CARE MNGMENT TRAINING: CPT

## 2024-01-30 PROCEDURE — 25000003 PHARM REV CODE 250: Performed by: STUDENT IN AN ORGANIZED HEALTH CARE EDUCATION/TRAINING PROGRAM

## 2024-01-30 PROCEDURE — 97110 THERAPEUTIC EXERCISES: CPT

## 2024-01-30 PROCEDURE — 97116 GAIT TRAINING THERAPY: CPT

## 2024-01-30 RX ADMIN — TRANEXAMIC ACID 1000 MG: 10 INJECTION, SOLUTION INTRAVENOUS at 12:01

## 2024-01-30 RX ADMIN — TRANEXAMIC ACID 1000 MG: 10 INJECTION, SOLUTION INTRAVENOUS at 05:01

## 2024-01-30 RX ADMIN — ACETAMINOPHEN 650 MG: 325 TABLET ORAL at 01:01

## 2024-01-30 RX ADMIN — ASPIRIN 81 MG CHEWABLE TABLET 81 MG: 81 TABLET CHEWABLE at 09:01

## 2024-01-30 RX ADMIN — ACETAMINOPHEN 650 MG: 325 TABLET ORAL at 12:01

## 2024-01-30 RX ADMIN — CELECOXIB 200 MG: 100 CAPSULE ORAL at 09:01

## 2024-01-30 RX ADMIN — PREGABALIN 75 MG: 75 CAPSULE ORAL at 09:01

## 2024-01-30 RX ADMIN — FAMOTIDINE 20 MG: 20 TABLET ORAL at 09:01

## 2024-01-30 RX ADMIN — DOCUSATE SODIUM AND SENNOSIDES 1 TABLET: 8.6; 5 TABLET, FILM COATED ORAL at 09:01

## 2024-01-30 RX ADMIN — ACETAMINOPHEN 650 MG: 325 TABLET ORAL at 05:01

## 2024-01-30 NOTE — PLAN OF CARE
01/30/24 0054   Admission   Initial VN Admission Questions Complete   Communication Issues? None   Shift   Virtual Nurse - Rounding Complete   Pain Management Interventions quiet environment facilitated;relaxation techniques promoted   Virtual Nurse - Patient Verbalized Approval Of VN Rounding;Camera Use   Type of Frequent Check   Type Patient Rounds   Safety/Activity   Patient Rounds bed wheels locked;clutter free environment maintained;ID band on;visualized patient;call light in patient/parent reach;placement of personal items at bedside;bed in low position   Safety Promotion/Fall Prevention assistive device/personal item within reach;bed alarm set;nonskid shoes/socks when out of bed;side rails raised x 2;instructed to call staff for mobility;room near unit station;high risk medications identified;Fall Risk reviewed with patient/family   Safety Precautions emergency equipment at bedside   Safety Bands on Patient Fall Risk Band   Activity Assistance Provided assistance, stand-by   Positioning   Body Position position changed independently   Head of Bed (HOB) Positioning HOB elevated   Positioning/Transfer Devices pillows;in use      VN cued into room to complete admit assessment. VIP model introduced; VN working alongside bedside treatment team.  Plan of care reviewed with patient. Patient informed of fall risk, fall precautions, call light within reach, side rails x2 elevated. Patient notified to ask staff for assistance. Patient verbalized complete understanding. Time allowed for questions. Will continue to monitor and intervene as needed.

## 2024-01-30 NOTE — PT/OT/SLP EVAL
Physical Therapy Evaluation    Patient Name:  Leah Minaya   MRN:  [id number]    Recommendations:     Discharge Recommendations: Low Intensity Therapy   Discharge Equipment Recommendations: none   Barriers to discharge: None    Assessment:     Leah Minaya is a 58 y.o. female admitted with a medical diagnosis of <principal problem not specified>.  She presents with the following impairments/functional limitations: impaired endurance, weakness, impaired functional mobility, gait instability, impaired balance, decreased lower extremity function, pain, decreased ROM, orthopedic precautions PPT evaluation completed this date. Overall, pt requires cga for bed mobility, transfers, and gait x 30 ft. Pt complaining of R hip pain at conclusion of ambulation and gait distance limited by BUE muscular fatigue. Anticipate pt will continue to progress towards home with Low intensity therapy services pending continued progress with PT.     Rehab Prognosis: Good; patient would benefit from acute skilled PT services to address these deficits and reach maximum level of function.    Recent Surgery: Procedure(s) (LRB):  ARTHROPLASTY, KNEE, TOTAL monoblock (Left) 1 Day Post-Op    Plan:     During this hospitalization, patient to be seen BID to address the identified rehab impairments via gait training, therapeutic activities, therapeutic exercises, neuromuscular re-education and progress toward the following goals:    Plan of Care Expires:  02/13/24    Subjective     Chief Complaint: Pain  Patient/Family Comments/goals: to walk better   Pain/Comfort:  Pain Rating 1:  (unrated muscle spasms, posterior L knee)    Patients cultural, spiritual, Religion conflicts given the current situation:      Living Environment:  Pt lives in a [facility] with her 26 y/o son. Pt reports she is not sure if her son will be home at all times to assist.   Prior to admission, patients level of function was Mod I with spc.  Equipment used at home: cane,  straight, walker, rolling.  DME owned (not currently used): rolling walker.  Upon discharge, patient will have assistance from son.    Objective:     Communicated with RN prior to session.  Patient found supine with PureWick  upon PT entry to room.    General Precautions: Standard, fall  Orthopedic Precautions:LLE weight bearing as tolerated   Braces: N/A  Respiratory Status: Room air    Exams:  Cognitive Exam:  Patient is oriented to Person, Place, Time, and Situation  RLE ROM: WFL  RLE Strength: WFL  LLE ROM: WFL except L knee, AROM -5 to 85 degrees approx of knee flexion.   LLE Strength: WFL except L knee flex/ext. Flex: 2/5, ext 3-/5    Functional Mobility:  Bed Mobility:     Supine to Sit: contact guard assistance  Sit to Supine: contact guard assistance  Transfers:     Sit to Stand:  contact guard assistance with rolling walker  Gait: x 30 ft with RW cga. Cues for sequencing and correct gait pattern. Pt limited by BUE fatigue  Balance: Good static and dynamic sitting balance. Good static standing balance with RW, fair dynamic standing balance with RW.       AM-PAC 6 CLICK MOBILITY  Total Score:19       Treatment & Education:  Demonstration provided of proper sequencing of transfers and gait with RW. Educated patient on proper positioning to avoid excessive knee flexion when in bed. Pt performed x 10 reps of the following exercises, quad sets, LAQ, seated heel slides. Required tactile cues for proper exercise performance throughout.   Pt utilized fishhook technique to manage LLE when performing sit to supine and otherwise required cga for bed mob, transfers, and gait x 30 ft with RW.    Patient left supine with all lines intact, call button in reach, and RN notified. HOB elevated.     GOALS:   Multidisciplinary Problems       Physical Therapy Goals          Problem: Physical Therapy    Goal Priority Disciplines Outcome Goal Variances Interventions   Physical Therapy Goal     PT, PT/OT Ongoing, Progressing      Description: Goals to be met by: 24     Patient will increase functional independence with mobility by performin. Supine to sit with Dobson  2. Sit to supine with Dobson  3. Sit to stand transfer with Modified Dobson w/RW  4. Gait  x 150 feet with Modified Dobson using Rolling Walker.   5. Ascend/descend 1 stair Modified Dobson using Rolling Walker.                          History:     Past Medical History:   Diagnosis Date    Benign essential HTN     Bilateral carpal tunnel syndrome 2017    History of prediabetes     Mixed hyperlipidemia 2017    Primary osteoarthritis of knees, bilateral     Vitamin D deficiency        Past Surgical History:   Procedure Laterality Date    none      TOTAL KNEE ARTHROPLASTY Left 2024    Procedure: ARTHROPLASTY, KNEE, TOTAL monoblock;  Surgeon: Mayur Rizzo MD;  Location: [facility];  Service: Orthopedics;  Laterality: Left;  cellerate and prineo for bmi>40, smokers, diabetics, and if on blood thinners, per Elida and Dr. Rizzo, this is a LEFT knee       Time Tracking:     PT Received On: 24  PT Start Time: 0848     PT Stop Time: 0915  PT Total Time (min): 27 min     Billable Minutes: Evaluation 10 and Therapeutic Exercise 17      2024

## 2024-01-30 NOTE — PROGRESS NOTES
"[facility] Orthopaedic Surgery Progress Note     In brief, 58 y.o. female status post left TKA, date of surgery 01/29/2024.    S:  No acute events overnight, was drowsy postop so did not work with therapy yesterday.  Reports pain is well controlled.  Eager to go home today     O:    Temp:  [97.3 °F (36.3 °C)-98 °F (36.7 °C)] 97.8 °F (36.6 °C)  Pulse:  [55-77] 61  Resp:  [8-38] 18  SpO2:  [91 %-100 %] 98 %  BP: (101-193)/() 137/63     General: NAD,  Cardio: Regular rate by peripheral pulse   Pulm: Normal WOB on room air, symmetric chest expansion  Abd: Soft, NT, ND  Psych: normal affect/mood  Neuro: A&O    MSK:     Left knee  Dressing: C/D/I  Compartments: soft and compressible   Motor Intact:  TA/GS/EHL/FHL  SILT:  SP/DP/SP/S/T distributions  Pulses:2+     Labs:  No results found for this or any previous visit (from the past 24 hour(s)).    No results for input(s): "WBC", "HGB", "HCT", "PLT" in the last 72 hours.  No results for input(s): "NA", "K", "CL", "CO2", "HCO3C", "BUN", "LABCREA", "GLU" in the last 72 hours.  No results for input(s): "ESR", "CRP" in the last 72 hours.     Imaging:  X-rays obtained reviewed. Components appropriately placed. No evidence of hardware complication.        Assessment/Plan:     58 y.o. female POD1 s/p TKA. Doing well.     WBAT  PT/OT   Dc once cleared by therapy   She is concerned that her son has her DME in his car, this has been communicated with her bedside nurse    Pratibha Cohen MD  [facility] Orthopaedic Surgery, PGY-3  1/30/2024 8:24 AM    "

## 2024-01-30 NOTE — PLAN OF CARE
AVS and educational attachments shared with patient and son via [employer]. Discussed thoroughly. Patient and son verbalized clear understanding using teach back method. Notified bedside nurse of completion of education. At present no distress noted. Patient to be discharged via w/c with escort service and family with all of patient's belonings. Will cont to be available to patient and family and intervene prn.

## 2024-01-30 NOTE — PLAN OF CARE
Meets criteria for discharge from PACU. STARLA swift. Pain  3/10, Released by anesthesia. Handoff Report to Paulina KILGORE 5A  ]

## 2024-01-30 NOTE — PT/OT/SLP PROGRESS
Physical Therapy Treatment    Patient Name:  Leah Minaya   MRN:  [id number]    Recommendations:     Discharge Recommendations: Low Intensity Therapy  Discharge Equipment Recommendations: none  Barriers to discharge: None    Assessment:     Leah Minaya is a 58 y.o. female admitted with a medical diagnosis of <principal problem not specified>.  She presents with the following impairments/functional limitations: weakness, pain, decreased ROM, orthopedic precautions Pt is functionally safe for dc home today and to follow up with OP PT. Pt is now modified independent with bed mobility, transfers, and gait x 100 ft. Pt reports BUE fatigue after 50 ft of ambulation. Cues provided throughout for increased L knee flexion during swing phase of gait and cues for heel to toe gait pattern. Educated pt on performing ankle pumps, ambulation, and limited time in bed for DVT prophylaxis. Pt continuing to progress towards goals, however, safe for dc home.  Son will be available to assist at time of dc as needed.     Rehab Prognosis: Good; patient would benefit from acute skilled PT services to address these deficits and reach maximum level of function.    Recent Surgery: Procedure(s) (LRB):  ARTHROPLASTY, KNEE, TOTAL monoblock (Left) 1 Day Post-Op    Plan:     During this hospitalization, patient to be seen BID to address the identified rehab impairments via gait training, therapeutic activities, therapeutic exercises, neuromuscular re-education and progress toward the following goals:    Plan of Care Expires:  02/13/24    Subjective     Chief Complaint: to go home   Patient/Family Comments/goals: walk normal  Pain/Comfort:  Pain Rating 1: 4/10  Location - Side 1: Left  Location 1: knee      Objective:     Communicated with RN prior to session.  Patient found supine upon PT entry to room.     General Precautions: Standard, fall  Orthopedic Precautions: LLE weight bearing as tolerated  Braces: N/A  Respiratory Status: Room air      Functional Mobility:  Bed Mobility:     Supine to Sit: independence  Sit to Supine: modified independence  Transfers:     Sit to Stand:  modified independence with rolling walker  Gait: x 100 ft with RW mod I.   Balance: Good static and dynamic sitting balance. Pt demonstrates good static and dynamic standing balance with RW.   Stairs:  Pt ascended/descended Discussed with patient sequencing of ascending/descending 1 threshold step with RW.  Discussed with pt up with good and down with bad. Pt able to recall sequencing and explain sequencing to PT.       AM-PAC 6 CLICK MOBILITY  Turning over in bed (including adjusting bedclothes, sheets and blankets)?: 4  Sitting down on and standing up from a chair with arms (e.g., wheelchair, bedside commode, etc.): 4  Moving from lying on back to sitting on the side of the bed?: 4  Moving to and from a bed to a chair (including a wheelchair)?: 4  Need to walk in hospital room?: 4  Climbing 3-5 steps with a railing?: 3  Basic Mobility Total Score: 23       Treatment & Education:  Educated patient on post op positioning of LLE to prevent knee flexion contractures, DVT prophylaxis, importance of functional mobility and ROM exercises at home. Patient modified independent with bed mobility, transfers, and gait x 100 ft with RW. Discussed how patient  should negotiate her 1 threshold step to enter. Pt reporting L posterior calf pain, when assessed PT noted area to be warm to touch as well. RN notified regarding findings during assessment of L posterior calf pain. Pt is safe to dc home once medically cleared. Son brought pt's RW for home and PT adjusted to appropriate height.     Patient left supine with all lines intact, call button in reach, and RN notified..    GOALS:   Multidisciplinary Problems       Physical Therapy Goals          Problem: Physical Therapy    Goal Priority Disciplines Outcome Goal Variances Interventions   Physical Therapy Goal     PT, PT/OT Ongoing,  Progressing     Description: Goals to be met by: 24     Patient will increase functional independence with mobility by performin. Supine to sit with Ramsey  2. Sit to supine with Ramsey  3. Sit to stand transfer with Modified Ramsey w/RW  4. Gait  x 150 feet with Modified Ramsey using Rolling Walker.   5. Ascend/descend 1 stair Modified Ramsey using Rolling Walker.                          Time Tracking:     PT Received On: 24  PT Start Time: 1333     PT Stop Time: 1357  PT Total Time (min): 24 min     Billable Minutes: Gait Training 12 and Therapeutic Activity 12    Treatment Type: Treatment  PT/PTA: PTA     Number of PTA visits since last PT visit: 0     2024

## 2024-01-30 NOTE — PT/OT/SLP EVAL
Occupational Therapy   Evaluation and treatment    Name: Leah Minaya  MRN: [id number]  Admitting Diagnosis: <principal problem not specified>  Recent Surgery: Procedure(s) (LRB):  ARTHROPLASTY, KNEE, TOTAL monoblock (Left) 1 Day Post-Op    Recommendations:     Discharge Recommendations: Low Intensity Therapy (OP PT)  Discharge Equipment Recommendations:  none  Barriers to discharge:  None    Assessment:     Leah Minaya is a 58 y.o. female with a medical diagnosis of <principal problem not specified>.  She presents with ..The primary encounter diagnosis was Primary osteoarthritis of right knee. Diagnoses of Arthritis of knee and Primary osteoarthritis of left knee were also pertinent to this visit. . Performance deficits affecting function: weakness, impaired endurance, impaired self care skills, impaired functional mobility, gait instability, impaired balance, decreased lower extremity function, orthopedic precautions.      Occupational therapy evaluation completed s/p L TKA by Dr. Rizzo. At baseline patient is usually functioning at Modified independent level for ADLs and functional mobility with SPC. Initiated education and instruction on  proper icing/elevation/positioning regimen, adaptive ADL techniques, and safe functional mobility with rolling walker. Skilled acute OT will follow while hospitalized. Patient functionally safe for discharge home with low intensity therapy (OP PT) pending additional acute care PT session. No DME needs.     Rehab Prognosis: Good; patient would benefit from acute skilled OT services to address these deficits and reach maximum level of function.       Plan:     Patient to be seen 5 x/week to address the above listed problems via self-care/home management, therapeutic activities, therapeutic exercises  Plan of Care Expires: 02/13/24  Plan of Care Reviewed with: patient, son    Subjective     Chief Complaint: no current pain but reports recent R shoulder/hip pain, increased  "fatigue, "pressure in calves"  Patient/Family Comments/goals: patient engaged in education/discussion on already-owned DME proper placement and use in home; patient reports no falls within last 3 months    Occupational Profile:  Living Environment: Patient lives with son in [facility], 1 step to enter  Previous level of function: At baseline patient is usually functioning at Modified independent level for ADLs and functional mobility with SPC. Patient reports furniture walking and/or use of SPC within the home; SPC within community. Patient reports bathing seated down in tub in t/s combo  Equipment Used at Home: cane, straight (owns but doesn't use: RW, BSC, SC)  Assistance upon Discharge: son is in and out of home    Pain/Comfort:  Pain Rating 1: 0/10  Pain Addressed 1: Reposition  Pain Rating Post-Intervention 1: 0/10      Objective:     Communicated with: nursing prior to session.  Patient found HOB elevated with bed alarm, PureWick, peripheral IV upon OT entry to room.    General Precautions: Standard, fall  Orthopedic Precautions: LLE weight bearing as tolerated  Braces: N/A  Respiratory Status: Room air    Occupational Performance:    Bed Mobility:    Patient completed Scooting/Bridging with independence  Patient completed Supine to Sit with modified independence with increased time  Patient completed Sit to Supine with modified independence with increased time    Functional Mobility/Transfers:  Patient completed 3 trials Sit <> Stand Transfer with contact guard assistance  with  rolling walker   Patient completed Toilet Transfer Step Transfer technique with minimum assistance progressing to CGA with  rolling walker to toilet in true bathroom  Functional Mobility: Patient performed in room mobility as above with instruction on step sequencing, precautions, proper hand placement, safety with transitional movements.    Activities of Daily Living:  Grooming: modified independence at sink for oral hygiene, hand hygiene " with RW  Lower Body Dressing: stand by assistance seated on toilet w instruction on adaptive dressing techniques for affected extremity   Toileting: stand by assistance with instruction on seated performance, adaptive techniques for safety, already-owned BSC placement/use to maximize performance at home    Cognitive/Visual Perceptual:  Cognitive/Psychosocial Skills:     -       Oriented to: Person, Place, Time, and Situation   -       Follows Commands/attention:Follows multistep  commands  -       Communication: clear/fluent  -       Safety awareness/insight to disability: intact     Physical Exam:  Motor Planning:    -       Intact  Upper Extremity Range of Motion:     -       Right Upper Extremity: WFL  -       Left Upper Extremity: WFL  Upper Extremity Strength:    -       Right Upper Extremity: WFL; 3+/5 shoulder, elbow  -       Left Upper Extremity: WFL; 3+/5 shoulder, elbow   Strength:    -       Right Upper Extremity: WFL  -       Left Upper Extremity: WFL  Gross motor coordination:   WFL    AMPAC 6 Click ADL:  AMPAC Total Score: 24    Treatment & Education:  Patient educated on role of OT/ POC development.   Patient educated on positioning / elevating surgical LLE with correct placement of pillow to encourage knee extension and prevent knee contracture, and use of ice pack with barrier.   ADL  / functional mobility training as above with instruction on step sequencing.  Lower body dressing performed by donning surgical extremity first  following therapist demonstration.   Functional mobility performed as above with instructed mobility sequence and correct hand placement during transitional movements.  Educated on home safety with showers and/or shower transfer via performance of dry run approach prior to true shower with reciprocation verbally.   Educated on placement/use of already-owned DME (shower chair, RW, BSC)  Returned to bed with ice pack over LLE with instructions to wear for 15 minutes and all  needs met.    Patient left HOB elevated with all lines intact, call button in reach, bed alarm on, nursing notified, and son present    GOALS:   Multidisciplinary Problems       Occupational Therapy Goals          Problem: Occupational Therapy    Goal Priority Disciplines Outcome Interventions   Occupational Therapy Goal     OT, PT/OT Ongoing, Progressing    Description: Goals to be met by: 2/13/2024     Patient will increase functional independence with ADLs by performing:    UE Dressing with [place] with item retrieval task.  LE Dressing with [place] with LB step sequencing.  Grooming while standing with [place].  Toileting from raised toilet with [place] for hygiene and clothing management.   Toilet transfer to raised toilet with [place] and least restrictive device.                         History:     Past Medical History:   Diagnosis Date    Benign essential HTN     Bilateral carpal tunnel syndrome 5/8/2017    History of prediabetes     Mixed hyperlipidemia 5/8/2017    Primary osteoarthritis of knees, bilateral     Vitamin D deficiency          Past Surgical History:   Procedure Laterality Date    none      TOTAL KNEE ARTHROPLASTY Left 1/29/2024    Procedure: ARTHROPLASTY, KNEE, TOTAL monoblock;  Surgeon: Mayur Rizzo MD;  Location: [facility];  Service: Orthopedics;  Laterality: Left;  cellerate and prineo for bmi>40, smokers, diabetics, and if on blood thinners, per Elida and Dr. Rizzo, this is a LEFT knee       Time Tracking:     OT Date of Treatment: 01/30/24  OT Start Time: 1022  OT Stop Time: 1104  OT Total Time (min): 42 min    Billable Minutes:Evaluation 15 min  Self Care/Home Management 15 min  Therapeutic Activity 12 min    1/30/2024

## 2024-01-30 NOTE — TELEPHONE ENCOUNTER
Raj study  Study ID#[id number]    Was unable to leave message for patient. I have sent her a message through email and the patient portal.    Reminded patient to complete her study questionnaires.

## 2024-01-30 NOTE — PLAN OF CARE
VN note: VN completed AVS and attachments and notified bedside nurse, Bi. Will cont to be available and intervene prn.

## 2024-01-30 NOTE — ANESTHESIA POSTPROCEDURE EVALUATION
Anesthesia Post Evaluation    Patient: Leah Minaya    Procedure(s) Performed: Procedure(s) (LRB):  ARTHROPLASTY, KNEE, TOTAL monoblock (Left)    Final Anesthesia Type: general  The surgery is a total joint replacement and the reasoning for not using regional anesthesia is   Attempted and Failed  Patient location during evaluation: PACU  Patient participation: Yes- Able to Participate  Level of consciousness: lethargic  Post-procedure vital signs: reviewed and stable  Pain management: adequate  Airway patency: patent    PONV status at discharge: No PONV  Anesthetic complications: yes  Perioperative Events: prolonged PACU stay - patient condition and unplanned hospital admission        Cardiovascular status: blood pressure returned to baseline  Respiratory status: unassisted  Hydration status: euvolemic  Follow-up not needed.              Vitals Value Taken Time   /68 01/29/24 2000   Temp 34.7 °C (94.5 °F) 01/29/24 2000   Pulse 62 01/29/24 2000   Resp 18 01/29/24 2000   SpO2 95 % 01/29/24 2000         Event Time   Out of Recovery 11:49:29         Pain/Samira Score: Pain Rating Prior to Med Admin: 3 (1/29/2024  6:01 PM)  Pain Rating Post Med Admin: 3 (1/29/2024  8:00 PM)  Samira Score: 9 (1/29/2024  8:00 PM)

## 2024-01-30 NOTE — PLAN OF CARE
Occupational therapy evaluation completed s/p L TKA by Dr. Rizzo. At baseline patient is usually functioning at Modified independent level for ADLs and functional mobility with SPC. Initiated education and instruction on  proper icing/elevation/positioning regimen, adaptive ADL techniques, and safe functional mobility with rolling walker. Skilled acute OT will follow while hospitalized. Patient functionally safe for discharge home with low intensity therapy (OP PT) pending additional acute care PT session. No DME needs.    Problem: Occupational Therapy  Goal: Occupational Therapy Goal  Description: Goals to be met by: 2/13/2024     Patient will increase functional independence with ADLs by performing:    UE Dressing with [place] with item retrieval task.  LE Dressing with [place] with LB step sequencing.  Grooming while standing with [place].  Toileting from raised toilet with [place] for hygiene and clothing management.   Toilet transfer to raised toilet with [place] and least restrictive device.    Outcome: Ongoing, Progressing

## 2024-01-30 NOTE — PLAN OF CARE
[facility] - Med Surg  Initial Discharge Assessment       Primary Care Provider: No, Primary Doctor    Admission Diagnosis: Primary osteoarthritis of right knee [M17.11]  Arthritis of knee [M17.10]    Admission Date: 1/29/2024  Expected Discharge Date: 1/29/2024    DCA done with pt. Independent with ADLs. Planned procedure with Dr. Rizzo. OP recovery status. Has DME for RW and straight cane. Has BSC and BB. Son to  at [place]. Reports that there is major construction on her straight.       Payor: [employer] / Plan: [employer] / Product Type: Commercial /     Extended Emergency Contact Information  Primary Emergency Contact: Douglas Spencer  Mobile Phone: [phone]  Relation: Son  Secondary Emergency Contact: Gay Garsia   [facility]  Home Phone: [phone]  Relation: Relative    Discharge Plan A: (P) Home, Home with family         [facility] [address] [facility] [address] [facility] [address]  [address] [facility] [address]  Phone: [phone] Fax: [phone]    [id number] [address] [place] [address] AT [facility] [place] [address]  [address] [facility] [address]  Phone: [phone] Fax: [phone]       01/30/24 0838   Discharge Planning   Assessment Type Discharge Planning Brief Assessment   Resource/Environmental Concerns none   Equipment Currently Used at Home cane, straight;walker, rolling   Current Living Arrangements home   Patient/Family Anticipates Transition to home;home with family   Patient/Family Anticipated Services at Transition outpatient care   DME Needed Upon Discharge  none   Discharge Plan A Home;Home with family       Future Appointments   Date Time Provider Department Center   1/30/2024 10:00 AM Zacarias Estrada, PT [facility] [place] [facility]   2/1/2024 10:00 AM Ivy Fernandez, PT [facility] [place] [facility]   2/2/2024 10:00 AM Patt Wilder, G[facility] [place] [facility]   2/5/2024 10:00 AM Ivy Fernandez, PT [facility]  "[facility]   2/6/2024 10:00 AM Zacarias Estrada, PT [facility] [place] [facility]   2/7/2024 10:00 AM Zacarias Estrada, PT [facility]   2/9/2024 10:00 AM Zacarias Estrada, PT [facility] [place] [facility]   2/12/2024  9:00 AM Patt Wilder, PT [facility] [place] [facility]   2/14/2024 10:00 AM Patt Wilder, PT [facility] [place] [facility]   2/16/2024 10:00 AM Ivy Fernandez, PT [facility] [place] [facility]   2/19/2024 10:00 AM Zacarias Estrada, PT [facility] [place] [facility]   2/20/2024  9:15 AM Mayur Rizzo MD [id number] [place] [facility]   2/21/2024 10:00 AM Zacarias Estrada, PT [facility]   2/23/2024 10:00 AM Zacarias Estrada, PT [facility]   2/26/2024 11:00 AM Zacarias Estrada, PT [facility] [place] [facility]   2/28/2024 10:00 AM Zacarias Estrada, PT [facility]   3/4/2024 10:00 AM Zacarias Estrada, PT [facility] [place] [facility]   3/6/2024 10:00 AM Zacarias Estrada, PT [facility] [place] [facility]     /63 (Patient Position: Lying)   Pulse 61   Temp 97.8 °F (36.6 °C) (Oral)   Resp 18   Ht 5' 4" (1.626 m)   Wt 96 kg (211 lb 10.3 oz)   LMP 10/07/2018   SpO2 98%   Breastfeeding No   BMI 36.33 kg/m²      acetaminophen  650 mg Oral Q6H    aspirin  81 mg Oral BID    ceFAZolin (Ancef) IV (PEDS and ADULTS)  2 g Intravenous Once    celecoxib  200 mg Oral BID    dexAMETHasone sodium phos (PF)  10 mg Intravenous Once    famotidine  20 mg Oral BID    pregabalin  75 mg Oral BID    senna-docusate 8.6-50 mg  1 tablet Oral BID    tranexamic acid (CYKLOKAPRON) infusion  1,000 mg Intravenous Once    tranexamic acid (CYKLOKAPRON) 3,000 mg in sodium chloride 0.9% SolP 100 mL  3,000 mg Irrigation Once    tranexamic acid (CYKLOKAPRON) infusion  1,000 mg Intravenous Q6H     Consults (From admission, onward)          Status Ordering Provider     Inpatient consult to Social Work/Case Management  Once        Provider:  (Not yet assigned)    Completed MOISÉS, " CAM     Inpatient consult to Family Practice  As needed        Provider:  (Not yet assigned)    Acknowledged CAM CURIEL

## 2024-01-30 NOTE — PLAN OF CARE
Problem: Physical Therapy  Goal: Physical Therapy Goal  Description: Goals to be met by: 24     Patient will increase functional independence with mobility by performin. Supine to sit with [place]  2. Sit to supine with [place]  3. Sit to stand transfer with Modified [place] w/RW  4. Gait  x 150 feet with Modified [place] using Rolling Walker.   5. Ascend/descend 1 stair Modified [place] using Rolling Walker.     Outcome: Ongoing, Progressing     PT evaluation completed. Pt overall requiring cga for functional mobility and gait x 30 ft. Pt educated and performed x 10 reps of LE therex and educated patient on post op positioning for TKR. Anticipate patient will progress towards home following a few more therapy sessions. Will continue to follow.

## 2024-01-30 NOTE — PLAN OF CARE
Problem: Adult Inpatient Plan of Care  Goal: Plan of Care Review  Outcome: Ongoing, Progressing     Problem: Adult Inpatient Plan of Care  Goal: Patient-Specific Goal (Individualized)  Outcome: Ongoing, Progressing     Problem: Adult Inpatient Plan of Care  Goal: Absence of Hospital-Acquired Illness or Injury  Outcome: Ongoing, Progressing     Problem: Adult Inpatient Plan of Care  Goal: Optimal Comfort and Wellbeing  Outcome: Ongoing, Progressing     Problem: Adult Inpatient Plan of Care  Goal: Readiness for Transition of Care  Outcome: Ongoing, Progressing     Problem: Fall Injury Risk  Goal: Absence of Fall and Fall-Related Injury  Outcome: Ongoing, Progressing     Problem: Hypertension Comorbidity  Goal: Blood Pressure in Desired Range  Outcome: Ongoing, Progressing     Problem: Bleeding (Knee Arthroplasty)  Goal: Absence of Bleeding  Outcome: Ongoing, Progressing     Problem: Bowel Motility Impaired (Knee Arthroplasty)  Goal: Effective Bowel Elimination  Outcome: Ongoing, Progressing     Problem: Functional Ability Impaired (Knee Arthroplasty)  Goal: Optimal Functional Ability  Outcome: Ongoing, Progressing     Problem: Infection (Knee Arthroplasty)  Goal: Absence of Infection Signs and Symptoms  Outcome: Ongoing, Progressing     Problem: Neurovascular Compromise (Knee Arthroplasty)  Goal: Intact Neurovascular Status  Outcome: Ongoing, Progressing     Problem: Ongoing Anesthesia Effects (Knee Arthroplasty)  Goal: Anesthesia/Sedation Recovery  Outcome: Ongoing, Progressing     Patient is AAOx 4. On  room air satting 100%. Nightly and prn medications administered per MAR.  LEft leg remain elevated.  Purewick utilized, due to patient did not do her pt/ot after s/p surgery. Anticipating discharge for today.Safety precautions secured . Will continue to monitor.

## 2024-01-30 NOTE — PLAN OF CARE
[place] - Med Surg  Discharge Final Note    Primary Care Provider: No, Primary Doctor    Expected Discharge Date: 1/29/2024    Pharmacist will go over home medications and reasons for medications. VN and bedside nurse to reiterate final discharge instructions.     Cleared from CM . Bedside Nurse and VN notified.    Son to pickup at [place]. Has RW in his car.    Final Discharge Note (most recent)       Final Note - 01/30/24 0848          Final Note    Assessment Type Final Discharge Note (P)      Anticipated Discharge Disposition Home or Self Care (P)      Hospital Resources/Appts/Education Provided Post-Acute resouces added to AVS (P)         Post-Acute Status    Post-Acute Authorization Other (P)      Other Status No Post-Acute Service Needs (P)      Discharge Delays None known at this time (P)                    Future Appointments   Date Time Provider Department Center   1/31/2024  1:00 PM Javier Burgess, PT [facility] [place] [facility]   2/1/2024 10:00 AM Ivy Fernandez, PT [facility] [place] [facility]   2/2/2024 10:00 AM Patt Wilder, PT [facility] [place] [facility]   2/5/2024 10:00 AM Ivy Fernandez, PT [facility] [place] [facility]   2/6/2024 10:00 AM Zacarias Estrada, PT [facility] [place] [facility]   2/7/2024 10:00 AM Zacarias Estrada, PT [facility] [place] [facility]   2/9/2024 10:00 AM Zacarias Estrada, PT [facility] [place] [facility]   2/12/2024  9:00 AM Patt Wilder, PT [facility] [place] [facility]   2/14/2024 10:00 AM Patt Wilder, PT [facility] [place] [facility]   2/16/2024 10:00 AM Ivy Fernandez PT [facility] [place] [facility]   2/19/2024 10:00 AM Zacarias Estrada, PT [facility] [place] [facility]   2/20/2024  9:15 AM Mayur Rizzo MD [id number]   2/21/2024 10:00 AM Zacarias Estrada, PT [facility] [place] [facility]   2/23/2024 10:00 AM Zacarias Estrada, PT [facility]   2/26/2024 11:00 AM Zacarias Estrada, PT [facility]   2/28/2024 10:00 AM Zacarias Estrada, PT [facility]  "[facility]   3/4/2024 10:00 AM Zacarias Estrada, PT [facility] [place] [facility]   3/6/2024 10:00 AM Zacarias Estrada, PT [facility] [facility]    /63 (Patient Position: Lying)   Pulse 61   Temp 97.8 °F (36.6 °C) (Oral)   Resp 18   Ht 5' 4" (1.626 m)   Wt 96 kg (211 lb 10.3 oz)   LMP 10/07/2018   SpO2 98%   Breastfeeding No   BMI 36.33 kg/m²        Medication List        START taking these medications      acetaminophen 325 MG tablet  Commonly known as: TYLENOL  Take 1 tablet (325 mg total) by mouth every 4 (four) hours. for 14 days     aspirin 81 MG EC tablet  Commonly known as: ECOTRIN  Take 1 tablet (81 mg total) by mouth 2 (two) times a day.     cephALEXin 500 MG capsule  Commonly known as: KEFLEX  Take 1 capsule (500 mg total) by mouth every 6 (six) hours. for 1 day     diclofenac 75 MG EC tablet  Commonly known as: VOLTAREN  Take 1 tablet (75 mg total) by mouth 2 (two) times daily.     famotidine 20 MG tablet  Commonly known as: PEPCID  Take 1 tablet (20 mg total) by mouth 2 (two) times daily.     tranexamic acid 650 mg tablet  Commonly known as: LYSTEDA  Take 1 tablet (650 mg total) by mouth 2 (two) times daily. for 14 days            CONTINUE taking these medications      albuterol 90 mcg/actuation inhaler  Commonly known as: PROVENTIL/VENTOLIN HFA  Inhale 2 puffs into the lungs every 4 (four) hours as needed for Wheezing or Shortness of Breath.     amLODIPine 5 MG tablet  Commonly known as: NORVASC  Take 1 tablet (5 mg total) by mouth once daily.     cetirizine 10 MG tablet  Commonly known as: ZYRTEC  Take 1 tablet (10 mg total) by mouth once daily.     clindamycin phosphate 1% 1 % gel  Commonly known as: CLINDAGEL  Apply topically 2 (two) times daily.     cyclobenzaprine 5 MG tablet  Commonly known as: FLEXERIL  TAKE ONE TABLET BY MOUTH NIGHTLY AS NEEDED FOR MUSCLE SPASMS     fluticasone propionate 50 mcg/actuation nasal spray  Commonly known as: FLONASE  1 spray (50 mcg total) by " Each Nare route once daily.     hydroCHLOROthiazide 25 MG tablet  Commonly known as: HYDRODIURIL  TAKE 1 TABLET BY MOUTH ONCE DAILY     hydroquinone 2 % Crea  Apply topically as needed.     ibuprofen 200 MG tablet  Commonly known as: ADVIL,MOTRIN     metronidazole 1% 1 % Gel  Commonly known as: METROGEL     mometasone 0.1% 0.1 % cream  Commonly known as: ELOCON  Apply topically once daily. for 10 days     * MYRBETRIQ 50 mg Tb24  Generic drug: mirabegron     * mirabegron 25 mg Tb24 ER tablet  Commonly known as: MYRBETRIQ     triamcinolone acetonide 0.1% 0.1 % cream  Commonly known as: KENALOG  Apply topically 2 (two) times daily.           * This list has 2 medication(s) that are the same as other medications prescribed for you. Read the directions carefully, and ask your doctor or other care provider to review them with you.                   Where to Get Your Medications        These medications were sent to [facility] [address], [place] [address]      Hours: Mon-Fri, 8a-5:30p Phone: [phone]   acetaminophen 325 MG tablet  aspirin 81 MG EC tablet  cephALEXin 500 MG capsule  diclofenac 75 MG EC tablet  famotidine 20 MG tablet  tranexamic acid 650 mg tablet       No orders of the defined types were placed in this encounter.

## 2024-01-31 ENCOUNTER — TELEPHONE (OUTPATIENT)
Dept: RESEARCH | Facility: HOSPITAL | Age: 59
End: 2024-01-31
Payer: COMMERCIAL

## 2024-01-31 ENCOUNTER — CLINICAL SUPPORT (OUTPATIENT)
Dept: REHABILITATION | Facility: HOSPITAL | Age: 59
End: 2024-01-31
Attending: ORTHOPAEDIC SURGERY
Payer: COMMERCIAL

## 2024-01-31 DIAGNOSIS — M17.12 PRIMARY OSTEOARTHRITIS OF LEFT KNEE: ICD-10-CM

## 2024-01-31 DIAGNOSIS — M25.662 DECREASED RANGE OF MOTION (ROM) OF LEFT KNEE: Primary | ICD-10-CM

## 2024-01-31 DIAGNOSIS — Z74.09 DECREASED FUNCTIONAL MOBILITY AND ENDURANCE: ICD-10-CM

## 2024-01-31 DIAGNOSIS — M62.81 QUADRICEPS WEAKNESS: ICD-10-CM

## 2024-01-31 DIAGNOSIS — M25.562 CHRONIC PAIN OF LEFT KNEE: ICD-10-CM

## 2024-01-31 DIAGNOSIS — G89.29 CHRONIC PAIN OF LEFT KNEE: ICD-10-CM

## 2024-01-31 PROCEDURE — 97140 MANUAL THERAPY 1/> REGIONS: CPT | Mod: PN | Performed by: PHYSICAL THERAPIST

## 2024-01-31 PROCEDURE — 97161 PT EVAL LOW COMPLEX 20 MIN: CPT | Mod: PN | Performed by: PHYSICAL THERAPIST

## 2024-01-31 PROCEDURE — 97110 THERAPEUTIC EXERCISES: CPT | Mod: PN | Performed by: PHYSICAL THERAPIST

## 2024-01-31 NOTE — PLAN OF CARE
[facility]  Physical Therapy Initial Evaluation    Date: 1/31/2024   Name: Leah Minaya  Clinic Number: [id number]    Therapy Diagnosis:   Encounter Diagnoses   Name Primary?    Chronic pain of left knee     Primary osteoarthritis of left knee     Quadriceps weakness     Decreased range of motion (ROM) of left knee Yes    Decreased functional mobility and endurance      Physician: Mayur Rizzo MD    Physician Orders: PT Eval and Treat   Medical Diagnosis from Referral:   M25.562,G89.29 (ICD-10-CM) - Chronic pain of left knee   M17.12 (ICD-10-CM) - Primary osteoarthritis of left knee   M62.81 (ICD-10-CM) - Quadriceps weakness     Evaluation Date: 1/31/2024  Authorization Period Expiration: 12/31/24  Plan of Care Expiration: 3/8/24  Progress Note Due: 3/6/24  Visit # / Visits authorized: 1/ 1   FOTO: 1/ 5   PTA Visit: 0/5    Precautions: Standard; status post left TKA on 1/29/24 by Dr. Rizzo    Time In: 1:15 pm (arrived late)  Time Out: 2:00 pm  Total Appointment Time (timed & untimed codes): 45 minutes (LCE, TE, MT)      SUBJECTIVE   Date of onset: chronic (TKA 1/29/24)    History of current condition - Leah reports: chronic knee pain. X-ray in march showed severe tricompartmental osteoarthritis with genu varum. She tried injections with some relief, but chose to have a left TKA on 1/29/24 by Dr. Rizzo. She had to stay overnight due to trouble waking up. She was referred to [facility] for [facility].     Falls: none    Imaging, see EMR    Prior Therapy: none  Social History: 1 EDUARDA, lives with son  Occupation:  (sitting for most of day)  Prior Level of Function: cane for 1.5 yrs for stability, trouble with stairs, generally independent  Current Level of Function: using ww, difficulty with sleeping, standing, walking, sit <> stand, ADLs    Pain:  Current 0/10, worst 3/10, best 0/10   Location: left knee  Description: Aching  Aggravating Factors: getting leg out of car, bending  knee  Easing Factors: rest in relaxed position    Patients goals: be able to run behind grandson, sit to stand easier     Medical History:   Past Medical History:   Diagnosis Date    Benign essential HTN     Bilateral carpal tunnel syndrome 5/8/2017    History of prediabetes     Mixed hyperlipidemia 5/8/2017    Primary osteoarthritis of knees, bilateral     Vitamin D deficiency        Surgical History:   Leah Minaya  has a past surgical history that includes none and Total knee arthroplasty (Left, 1/29/2024).    Medications:   Leah has a current medication list which includes the following prescription(s): acetaminophen, albuterol, amlodipine, aspirin, cetirizine, clindamycin phosphate 1%, cyclobenzaprine, diclofenac, famotidine, fluticasone propionate, hydrochlorothiazide, hydroquinone, ibuprofen, metronidazole 1%, mirabegron, mometasone 0.1%, myrbetriq, tranexamic acid, and triamcinolone acetonide 0.1%, and the following Facility-Administered Medications: acetaminophen, aspirin, bisacodyl, cefazolin 2 g/50ml dextrose ivpb, celecoxib, dexamethasone sodium phos (pf), diphenhydramine, famotidine, melatonin, ondansetron, ondansetron, pregabalin, senna-docusate 8.6-50 mg, tranexamic acid (CYKLOKAPRON) 1,000 mg in sodium chloride 0.9 % 100 mL IVPB (MB+), and tranexamic acid (CYKLOKAPRON) 3,000 mg in sodium chloride 0.9% SolP 100 mL.    Allergies:   Review of patient's allergies indicates:  No Known Allergies       OBJECTIVE       Gross Movement Analysis:  - Gait:  ww, antalgic, dec'd stance on left lower extremity, dec'd knee flexion during swing, circumduction  - sit <> stand: Bilateral upper extremity, right weight shift, left knee extended    Range of Motion:   Knee AROM (supine) 0-10-50 degree  Seated knee flexion AROM: 65 degree   Knee PROM: (supine) 0-6-50 deg    L/E Strength w/ MicroFET Muscle Leeroy Dynamometer Right Left Pain/Dysfunction with Movement   (approx 4 sec hold w/ max contraction)   Hip  "Flexion 7.8 kg  4 kg     Hip Abduction 8.6 kg  5.9 kg     Quadriceps 15.6 kg  3.4 kg     Hamstrings 9.8 kg  3.5 kg         TU seconds (ww)  TUG Cutoff Scores: 13.5         30 second sit-to-stand test (without U/E support): 5x (unable without upper extremity)  30" sit to stand Cutoff Scores:15  MDC = 3.5             CMS Impairment/Limitation/Restriction for FOTO Knee Survey    Therapist reviewed FOTO scores for Leah Minaya on 2024.   FOTO documents entered into [employer] - see Media section.    Limitation Score: 22%  Predicted Limitation Score: 56%         TREATMENT     Total Treatment time (time-based codes) separate from Evaluation: 24 minutes (TE, MT)    Leah received therapeutic exercises to develop strength, endurance, ROM, flexibility, posture, and core stabilization for 16 minutes including:    Home exercise program:  Knee extension stretch on bolster: 3 minutes   Quad set (with towel roll): 2 minutes with 5" holds  Seated hamstring stretch: 3x20" holds left   Strap gastroc stretch: 3x20" holds left   Long arc quad: 2x10 left     Leah received the following manual therapy techniques: Joint mobilizations, Myofacial release, Soft tissue Mobilization, and knee PROM were applied to the: left knee for 8 minutes, including:    Grade I-II left tibiofemoral A<> P mobs  Patellar mobs     Leah participated in neuromuscular re-education activities to improve: Balance, Kinesthetic, Proprioception, and Posture for 0 minutes. The following activities were included:   Not today    Leah participated in therapeutic activities to improve functional mobility and safety for 0  minutes, including:  Not today    PATIENT EDUCATION AND HOME EXERCISES     Education provided:   - anatomy  - importance of home exercise program compliance  - attendance policy  - expectations after TKA    Written Home Exercises Provided: yes. Exercises were reviewed and Leah was able to demonstrate them prior to the end of the " session.  Leah demonstrated good  understanding of the education provided. See EMR under Patient Instructions for exercises provided during therapy sessions.    ASSESSMENT     Leah is a 58 y.o. female referred to outpatient Physical Therapy with a medical diagnosis of left knee osteoarthritis & quadriceps weakness who had a left TKA by Dr. Rizzo on 1/29/24. Pt presents with left knee edema, poor range of motion and weakness of left lower extremity consistent with POD 2 TKA. She reports having knee flexion and extension range of motion deficits in the past and had failed conservative PT prior to TKA. Knee range of motion is 0-10-65 degree today. Pain under control. She arrives using ww, but had been using cane for about 1.5 yrs prior. She also has a hx of right knee osteoarthritis limiting her functional mobility. She's limited in all ADLs and IADLs at this time and requires her upper extremity to assist lifting her leg in and out of the car and bed. She is appropriate for skilled PT to help her reach her goals.    Pt prognosis is Good.   Pt will benefit from skilled outpatient Physical Therapy to address the deficits stated above and in the chart below, provide pt/family education, and to maximize pt's level of independence.     Plan of care discussed with patient: Yes  Pt's spiritual, cultural and educational needs considered and patient is agreeable to the plan of care and goals as stated below:     Anticipated Barriers for therapy: right knee osteoarthritis, range of motion limitations prior to surgery    Medical Necessity is demonstrated by the following  History  Co-morbidities and personal factors that may impact the plan of care Co-morbidities:   Vitamin D deficiency, prediabetes, HTN, hyperlipidemia    Personal Factors:   no deficits     high   Examination  Body Structures and Functions, activity limitations and participation restrictions that may impact the plan of care Body Regions:   lower  extremities    Body Systems:    gross symmetry  ROM  strength  balance  gait  transfers  transitions  motor control  motor learning  edema  scar formation    Participation Restrictions:   See above    Activity limitations:   Learning and applying knowledge  No deficits    General Tasks and Commands  No deficits    Communication  No deficits    Mobility  lifting and carrying objects  walking  driving (bike, car, motorcycle)    Self care  washing oneself (bathing, drying, washing hands)  caring for body parts (brushing teeth, shaving, grooming)  toileting  dressing  looking after one's health    Domestic Life  shopping  cooking  doing house work (cleaning house, washing dishes, laundry)    Interactions/Relationships  family relationships    Life Areas  employment    Community and Social Life  community life  recreation and leisure         high     Clinical Presentation stable and uncomplicated low   Decision Making/ Complexity Score: low   Goals:  Short Term Goals: 3 weeks   1.  Patient will demonstrate left knee ROM at least 0-5-90 degree.  2.  Patient will demonstrate left quad strength via MicroFET of > 11 kg to improve transfers, gait and ADL performance.  3.  Patient will report ability to sit with knee in symmetrical and comfortable knee flexed position.     Long Term Goals: 6 weeks   1.  Patient will demonstrate ability to perform > 9 sit <> stand transfers w/ UE use for improved ability to stand from low surfaces.  2.  Patient will demonstrate ability to negotiate a flight of stairs w/ 1 HR and reciprocal pattern.  3.  Patient will perform TUG in < 14 seconds for improved negra and safety with ambulation.  4.  Patient will demonstrate to PT comprehensive understanding of each HEP component without verbal cueing.   5.  Patient will improve FOTO to > 55% to improve ADL performance.    PLAN   Plan of care Certification: 1/31/2024 to 3/8/24.    Outpatient Physical Therapy 3-4 times weekly for 6 weeks to include  the following interventions: Cervical/Lumbar Traction, Electrical Stimulation TENS, IFC NMES, Manual Therapy, Moist Heat/ Ice, Neuromuscular Re-ed, Patient Education, Self Care, Therapeutic Activities, Therapeutic Exercise, and dry needling.     Javier Burgess, PT      I CERTIFY THE NEED FOR THESE SERVICES FURNISHED UNDER THIS PLAN OF TREATMENT AND WHILE UNDER MY CARE   Physician's comments:     Physician's Signature: ___________________________________________________

## 2024-01-31 NOTE — TELEPHONE ENCOUNTER
Raj Study   Study ID#[id number]    Called to remind patient to complete her Day 1 and Day 2 questionnaires post-tka. Patient understood and stated that she will get on it.

## 2024-01-31 NOTE — DISCHARGE SUMMARY
[facility] Surg  Brief Operative Note    Surgery Date: 1/29/2024     Surgeon(s) and Role:     * Mayur Rizzo MD - Primary    Assisting Surgeon: None    Pre-op Diagnosis:  Primary osteoarthritis of right knee [M17.11]    Post-op Diagnosis:  Post-Op Diagnosis Codes:     * Primary osteoarthritis of right knee [M17.11]    Procedure(s) (LRB):  ARTHROPLASTY, KNEE, TOTAL monoblock (Left)    Anesthesia: Spinal/Epidural    Operative Findings: see op    Estimated Blood Loss: 300 mL         Specimens:   Specimen (24h ago, onward)      None              Discharge Note    OUTCOME: Patient tolerated treatment/procedure well without complication and is now ready for discharge.    DISPOSITION: Home or Self Care    FINAL DIAGNOSIS:  <principal problem not specified>    FOLLOWUP: In clinic    DISCHARGE INSTRUCTIONS:    Discharge Procedure Orders   Diet Adult Regular     Notify your health care provider if you experience any of the following:  temperature >100.4     Notify your health care provider if you experience any of the following:  redness, tenderness, or signs of infection (pain, swelling, redness, odor or green/yellow discharge around incision site)     Change dressing (specify)   Order Comments: See discharge instructions     Activity as tolerated        Clinical Reference Documents Added to Patient Instructions         Document    CEPHALEXIN, ADULT (ENGLISH)    DICLOFENAC (SYSTEMIC), ADULT (ENGLISH)    FAMOTIDINE, ADULT (ENGLISH)    HEART HEALTHY DIET (ENGLISH)    HIGH BLOOD PRESSURE DISCHARGE INSTRUCTIONS (ENGLISH)    TOTAL KNEE REPLACEMENT DISCHARGE INSTRUCTIONS  (ENGLISH)    TRANEXAMIC ACID, ADULT (ENGLISH)

## 2024-01-31 NOTE — PROGRESS NOTES
[facility]  Physical Therapy Initial Evaluation    Date: 1/31/2024   Name: Leah Minaya  Clinic Number: [id number]    Therapy Diagnosis:   Encounter Diagnoses   Name Primary?    Chronic pain of left knee     Primary osteoarthritis of left knee     Quadriceps weakness     Decreased range of motion (ROM) of left knee Yes    Decreased functional mobility and endurance      Physician: Mayur Rizzo MD    Physician Orders: PT Eval and Treat   Medical Diagnosis from Referral:   M25.562,G89.29 (ICD-10-CM) - Chronic pain of left knee   M17.12 (ICD-10-CM) - Primary osteoarthritis of left knee   M62.81 (ICD-10-CM) - Quadriceps weakness     Evaluation Date: 1/31/2024  Authorization Period Expiration: 12/31/24  Plan of Care Expiration: 3/8/24  Progress Note Due: 3/6/24  Visit # / Visits authorized: 1/ 1   FOTO: 1/ 5   PTA Visit: 0/5    Precautions: Standard; status post left TKA on 1/29/24 by Dr. Rizzo    Time In: 1:15 pm (arrived late)  Time Out: 2:00 pm  Total Appointment Time (timed & untimed codes): 45 minutes (LCE, TE, MT)      SUBJECTIVE   Date of onset: chronic (TKA 1/29/24)    History of current condition - Leah reports: chronic knee pain. X-ray in march showed severe tricompartmental osteoarthritis with genu varum. She tried injections with some relief, but chose to have a left TKA on 1/29/24 by Dr. Rizzo. She had to stay overnight due to trouble waking up. She was referred to [facility] for [facility].     Falls: none    Imaging, see EMR    Prior Therapy: none  Social History: 1 EDUARDA, lives with son  Occupation:  (sitting for most of day)  Prior Level of Function: cane for 1.5 yrs for stability, trouble with stairs, generally independent  Current Level of Function: using ww, difficulty with sleeping, standing, walking, sit <> stand, ADLs    Pain:  Current 0/10, worst 3/10, best 0/10   Location: left knee  Description: Aching  Aggravating Factors: getting leg out of car, bending  knee  Easing Factors: rest in relaxed position    Patients goals: be able to run behind grandson, sit to stand easier     Medical History:   Past Medical History:   Diagnosis Date    Benign essential HTN     Bilateral carpal tunnel syndrome 5/8/2017    History of prediabetes     Mixed hyperlipidemia 5/8/2017    Primary osteoarthritis of knees, bilateral     Vitamin D deficiency        Surgical History:   Leah Minaya  has a past surgical history that includes none and Total knee arthroplasty (Left, 1/29/2024).    Medications:   Leah has a current medication list which includes the following prescription(s): acetaminophen, albuterol, amlodipine, aspirin, cetirizine, clindamycin phosphate 1%, cyclobenzaprine, diclofenac, famotidine, fluticasone propionate, hydrochlorothiazide, hydroquinone, ibuprofen, metronidazole 1%, mirabegron, mometasone 0.1%, myrbetriq, tranexamic acid, and triamcinolone acetonide 0.1%, and the following Facility-Administered Medications: acetaminophen, aspirin, bisacodyl, cefazolin 2 g/50ml dextrose ivpb, celecoxib, dexamethasone sodium phos (pf), diphenhydramine, famotidine, melatonin, ondansetron, ondansetron, pregabalin, senna-docusate 8.6-50 mg, tranexamic acid (CYKLOKAPRON) 1,000 mg in sodium chloride 0.9 % 100 mL IVPB (MB+), and tranexamic acid (CYKLOKAPRON) 3,000 mg in sodium chloride 0.9% SolP 100 mL.    Allergies:   Review of patient's allergies indicates:  No Known Allergies       OBJECTIVE       Gross Movement Analysis:  - Gait:  ww, antalgic, dec'd stance on left lower extremity, dec'd knee flexion during swing, circumduction  - sit <> stand: Bilateral upper extremity, right weight shift, left knee extended    Range of Motion:   Knee AROM (supine) 0-10-50 degree  Seated knee flexion AROM: 65 degree   Knee PROM: (supine) 0-6-50 deg    L/E Strength w/ MicroFET Muscle Leeroy Dynamometer Right Left Pain/Dysfunction with Movement   (approx 4 sec hold w/ max contraction)   Hip  "Flexion 7.8 kg  4 kg     Hip Abduction 8.6 kg  5.9 kg     Quadriceps 15.6 kg  3.4 kg     Hamstrings 9.8 kg  3.5 kg         TU seconds (ww)  TUG Cutoff Scores: 13.5         30 second sit-to-stand test (without U/E support): 5x (unable without upper extremity)  30" sit to stand Cutoff Scores:15  MDC = 3.5             CMS Impairment/Limitation/Restriction for FOTO Knee Survey    Therapist reviewed FOTO scores for Leah Minaya on 2024.   FOTO documents entered into [employer] - see Media section.    Limitation Score: 22%  Predicted Limitation Score: 56%         TREATMENT     Total Treatment time (time-based codes) separate from Evaluation: 24 minutes (TE, MT)    Leah received therapeutic exercises to develop strength, endurance, ROM, flexibility, posture, and core stabilization for 16 minutes including:    Home exercise program:  Knee extension stretch on bolster: 3 minutes   Quad set (with towel roll): 2 minutes with 5" holds  Seated hamstring stretch: 3x20" holds left   Strap gastroc stretch: 3x20" holds left   Long arc quad: 2x10 left     Leah received the following manual therapy techniques: Joint mobilizations, Myofacial release, Soft tissue Mobilization, and knee PROM were applied to the: left knee for 8 minutes, including:    Grade I-II left tibiofemoral A<> P mobs  Patellar mobs     Leah participated in neuromuscular re-education activities to improve: Balance, Kinesthetic, Proprioception, and Posture for 0 minutes. The following activities were included:   Not today    Leah participated in therapeutic activities to improve functional mobility and safety for 0  minutes, including:  Not today    PATIENT EDUCATION AND HOME EXERCISES     Education provided:   - anatomy  - importance of home exercise program compliance  - attendance policy  - expectations after TKA    Written Home Exercises Provided: yes. Exercises were reviewed and Leah was able to demonstrate them prior to the end of the " session.  Leah demonstrated good  understanding of the education provided. See EMR under Patient Instructions for exercises provided during therapy sessions.    ASSESSMENT     Leah is a 58 y.o. female referred to outpatient Physical Therapy with a medical diagnosis of left knee osteoarthritis & quadriceps weakness who had a left TKA by Dr. Rizzo on 1/29/24. Pt presents with left knee edema, poor range of motion and weakness of left lower extremity consistent with POD 2 TKA. She reports having knee flexion and extension range of motion deficits in the past and had failed conservative PT prior to TKA. Knee range of motion is 0-10-65 degree today. Pain under control. She arrives using ww, but had been using cane for about 1.5 yrs prior. She also has a hx of right knee osteoarthritis limiting her functional mobility. She's limited in all ADLs and IADLs at this time and requires her upper extremity to assist lifting her leg in and out of the car and bed. She is appropriate for skilled PT to help her reach her goals.    Pt prognosis is Good.   Pt will benefit from skilled outpatient Physical Therapy to address the deficits stated above and in the chart below, provide pt/family education, and to maximize pt's level of independence.     Plan of care discussed with patient: Yes  Pt's spiritual, cultural and educational needs considered and patient is agreeable to the plan of care and goals as stated below:     Anticipated Barriers for therapy: right knee osteoarthritis, range of motion limitations prior to surgery    Medical Necessity is demonstrated by the following  History  Co-morbidities and personal factors that may impact the plan of care Co-morbidities:   Vitamin D deficiency, prediabetes, HTN, hyperlipidemia    Personal Factors:   no deficits     high   Examination  Body Structures and Functions, activity limitations and participation restrictions that may impact the plan of care Body Regions:   lower  extremities    Body Systems:    gross symmetry  ROM  strength  balance  gait  transfers  transitions  motor control  motor learning  edema  scar formation    Participation Restrictions:   See above    Activity limitations:   Learning and applying knowledge  No deficits    General Tasks and Commands  No deficits    Communication  No deficits    Mobility  lifting and carrying objects  walking  driving (bike, car, motorcycle)    Self care  washing oneself (bathing, drying, washing hands)  caring for body parts (brushing teeth, shaving, grooming)  toileting  dressing  looking after one's health    Domestic Life  shopping  cooking  doing house work (cleaning house, washing dishes, laundry)    Interactions/Relationships  family relationships    Life Areas  employment    Community and Social Life  community life  recreation and leisure         high     Clinical Presentation stable and uncomplicated low   Decision Making/ Complexity Score: low   Goals:  Short Term Goals: 3 weeks   1.  Patient will demonstrate left knee ROM at least 0-5-90 degree.  2.  Patient will demonstrate left quad strength via MicroFET of > 11 kg to improve transfers, gait and ADL performance.  3.  Patient will report ability to sit with knee in symmetrical and comfortable knee flexed position.     Long Term Goals: 6 weeks   1.  Patient will demonstrate ability to perform > 9 sit <> stand transfers w/ UE use for improved ability to stand from low surfaces.  2.  Patient will demonstrate ability to negotiate a flight of stairs w/ 1 HR and reciprocal pattern.  3.  Patient will perform TUG in < 14 seconds for improved negra and safety with ambulation.  4.  Patient will demonstrate to PT comprehensive understanding of each HEP component without verbal cueing.   5.  Patient will improve FOTO to > 55% to improve ADL performance.    PLAN   Plan of care Certification: 1/31/2024 to 3/8/24.    Outpatient Physical Therapy 3-4 times weekly for 6 weeks to include  the following interventions: Cervical/Lumbar Traction, Electrical Stimulation TENS, IFC NMES, Manual Therapy, Moist Heat/ Ice, Neuromuscular Re-ed, Patient Education, Self Care, Therapeutic Activities, Therapeutic Exercise, and dry needling.     Javier Burgess, PT      I CERTIFY THE NEED FOR THESE SERVICES FURNISHED UNDER THIS PLAN OF TREATMENT AND WHILE UNDER MY CARE   Physician's comments:     Physician's Signature: ___________________________________________________

## 2024-02-01 ENCOUNTER — CLINICAL SUPPORT (OUTPATIENT)
Dept: REHABILITATION | Facility: HOSPITAL | Age: 59
End: 2024-02-01
Payer: COMMERCIAL

## 2024-02-01 DIAGNOSIS — Z74.09 DECREASED FUNCTIONAL MOBILITY AND ENDURANCE: ICD-10-CM

## 2024-02-01 DIAGNOSIS — M25.662 DECREASED RANGE OF MOTION (ROM) OF LEFT KNEE: Primary | ICD-10-CM

## 2024-02-01 PROCEDURE — 97140 MANUAL THERAPY 1/> REGIONS: CPT | Mod: PN

## 2024-02-01 PROCEDURE — 97110 THERAPEUTIC EXERCISES: CPT | Mod: PN

## 2024-02-01 NOTE — PROGRESS NOTES
"[facility]   Physical Therapy Treatment Note      Name: Leah Minaya  Clinic Number: [id number]    Therapy Diagnosis:   Encounter Diagnoses   Name Primary?    Decreased range of motion (ROM) of left knee Yes    Decreased functional mobility and endurance      Physician: Mayur Rizzo MD    Visit Date: 2/1/2024    Evaluation Date: 1/31/2024  Authorization Period Expiration: 12/31/24  Plan of Care Expiration: 3/8/2024  Progress Note Due: 3/8/2024  Visit # / Visits authorized: 1/20 + 1   FOTO: 2/5  PTA Visit #: 0/5      Precautions: Standard; status post left TKA on 1/29/24 by Dr. Rizzo     Time In: 1010  Time Out: 1120  Total Appointment Time (timed & untimed codes): 50 minutes    Subjective     Patient reports: increased back and buttock pain this morning.  She was compliant with home exercise program.  Response to previous treatment: fatigue  Functional change: difficulty getting comfortable at night - increased back pain upon waking up this AM     Pain: 7/10 bilateral low back and superolateral buttock    Objective      2/1/2023:  Knee active range of motion: 0-7-91 degrees after manual/heel slides   Passive range of motion: 0-6-93 degrees after manual/heel slides    Treatment     Leah received the treatments listed below:      therapeutic exercises to develop strength, endurance, ROM, and flexibility for 38 minutes including:    Supine quad sets: 5"x20 bilateral with towel under left knee; simultaneous performance to improve left sided contraction  Long sitting short arc quads: 3" 2x10 left *  Long sitting straight leg raise: 2x5 left with moderate assistance *  Long sitting heel prop: 5' left with strap to prevent toe out  Long sitting hamstring and gastroc stretch: 3x30" left with strap  Long sitting heel slides: 5"x10 left   Long arc quads: 2x10 left    Seated marching: 2x10 left. Verbal cues to increase height     manual therapy techniques: were applied to the: left knee for 12 " minutes, including:  Grade II-III patellar mobilizations  Passive knee extension  Adhesive bandage removal (planned for removal on 2/2. Early secondary to pain limiting flexion range     *hot pack applied to low back to decrease pain.     cold pack for 20 minutes at end of visit to mitigate knee pain.    Patient Education and Home Exercises       Education provided:   - Continue home exercise program. Perform heel prop and hamstring/calf stretch hourly.  - Importance of regaining knee extension early.    Written Home Exercises Provided: 1/31/2024. Exercises were reviewed and Leah was able to demonstrate them prior to the end of the session.  Leah demonstrated good  understanding of the education provided. See Electronic Medical Record under Patient Instructions for exercises provided during therapy sessions    Assessment     Leah is a 58 y.o. female that presents to outpatient Physical Therapy post-op day 3 left total knee arthroplasty. Left glute co-contraction during quad sets that diminishes with simultaneous contralateral performance. Good range of arc quad exercises. Difficulty lifting left lower extremity during mat transfers and straight leg raise. Improvement in knee flexion active assisted range of motion after bandage removed. No issues with scar covered with steri strips.    Leah Is progressing well towards her goals.   Patient prognosis is Good.   Patient will continue to benefit from skilled outpatient physical therapy to address the deficits listed in the problem list box on initial evaluation, provide pt/family education and to maximize pt's level of independence in the home and community environment.     Patient's spiritual, cultural and educational needs considered and pt agreeable to plan of care and goals.  Anticipated barriers to physical therapy: Right knee osteoarthritis, range of motion limitations prior to surgery     Short Term Goals: 3 weeks   1.  Patient will demonstrate left  knee ROM at least 0-5-90 degree. Progressing, not met   2.  Patient will demonstrate left quad strength via MicroFET of > 11 kg to improve transfers, gait and ADL performance. Progressing, not met   3.  Patient will report ability to sit with knee in symmetrical and comfortable knee flexed position. Progressing, not met      Long Term Goals: 6 weeks   1.  Patient will demonstrate ability to perform > 9 sit <> stand transfers w/ UE use for improved ability to stand from low surfaces. Progressing, not met   2.  Patient will demonstrate ability to negotiate a flight of stairs w/ 1 HR and reciprocal pattern. Progressing, not met   3.  Patient will perform TUG in < 14 seconds for improved negra and safety with ambulation. Progressing, not met   4.  Patient will demonstrate to PT comprehensive understanding of each HEP component without verbal cueing. Progressing, not met   5.  Patient will improve FOTO to > 55% to improve ADL performance. Progressing, not met     Plan     Regain extension. Attempt gait with less restrictive assistive device and standing exercises next.     Ivy Fernandez, PT, DPT, OCS

## 2024-02-02 ENCOUNTER — CLINICAL SUPPORT (OUTPATIENT)
Dept: REHABILITATION | Facility: HOSPITAL | Age: 59
End: 2024-02-02
Payer: COMMERCIAL

## 2024-02-02 DIAGNOSIS — Z74.09 DECREASED FUNCTIONAL MOBILITY AND ENDURANCE: ICD-10-CM

## 2024-02-02 DIAGNOSIS — M25.662 DECREASED RANGE OF MOTION (ROM) OF LEFT KNEE: Primary | ICD-10-CM

## 2024-02-02 PROCEDURE — 97116 GAIT TRAINING THERAPY: CPT | Mod: PN

## 2024-02-02 PROCEDURE — 97110 THERAPEUTIC EXERCISES: CPT | Mod: PN

## 2024-02-02 PROCEDURE — 97140 MANUAL THERAPY 1/> REGIONS: CPT | Mod: PN

## 2024-02-02 NOTE — PROGRESS NOTES
"[facility]   Physical Therapy Treatment Note      Name: Leah Minaya  Clinic Number: [id number]    Therapy Diagnosis:   Encounter Diagnoses   Name Primary?    Decreased range of motion (ROM) of left knee Yes    Decreased functional mobility and endurance      Physician: Mayur Rizzo MD    Visit Date: 2/2/2024    Evaluation Date: 1/31/2024  Authorization Period Expiration: 12/31/24  Plan of Care Expiration: 3/8/2024  Progress Note Due: 3/8/2024  Visit # / Visits authorized: 2/20 + 1   FOTO: 2/5  PTA Visit #: 0/5      Precautions: Standard; status post left TKA on 1/29/24 by Dr. Rizzo     Time In: 10:00 am   Time Out: 11:15 am  Total Appointment Time (timed & untimed codes): 75 minutes (3 TE; 1 MT; 1 GT)    Subjective     Patient reports: Improved back pain today since she slept on sofa instead of bed. Notes pain at distal end of femur at lateral condyle and medial tibial plateau. Anterior shin very numb.    She was compliant with home exercise program.  Response to previous treatment: fatigue  Functional change: more comfort on sofa versus bed; no longer having low back pain     Pain: 0/10 bilateral low back and superolateral buttock; 2-3/10 left knee      Objective      2/2/2024  Knee active range of motion: 0-5-95 degrees after manual/heel slides  Knee flexion AAROM with straps: 96 degrees    Treatment     Leah received the treatments listed below:      therapeutic exercises to develop strength, endurance, ROM, and flexibility for 50 minutes including:    Endurance training with reciprocal motion of upper and lower limbs on sci-fit x 5' at level 2 at > or equal to 50 spm without rest - for knee range of motion and tissue extensibility     Long sitting heel prop: 5' left with strap to prevent toe out  Supine quad sets: 5"x20 bilateral with towel under left knee; simultaneous performance to improve left sided contraction  Long sitting hamstring and gastroc stretch: 3x30" left with " "strap  Long sitting straight leg raise: 3x5 left with min assist x3 reps then independently  Long sitting heel slides: 5"x10 left   Long sitting short arc quads: 3" 3x10 left   Long arc quads: 2x10 left    Gastroc fitter stretch: 3x30" left   Heel raises: 2x10   Seated marching: 2x10 left. Verbal cues to increase height       manual therapy techniques: were applied to the: left knee for 15 minutes, including:  Grade II-III patellar mobilizations  Passive knee extension    gait training to improve functional mobility and safety for 10 minutes, including:  Gait training with SPC: 160 feet. 2 rounds. Verbal cueing for heel-toe contact for extension.      cold pack for 00 minutes at end of visit to mitigate knee pain.    Patient Education and Home Exercises       Education provided:   - Continue home exercise program. Perform heel prop and hamstring/calf stretch hourly.  - Importance of regaining knee extension early.    Written Home Exercises Provided: 1/31/2024. Exercises were reviewed and Leah was able to demonstrate them prior to the end of the session.  Leah demonstrated good  understanding of the education provided. See Electronic Medical Record under Patient Instructions for exercises provided during therapy sessions    Assessment     Leah is a 58 y.o. female that presents to outpatient Physical Therapy post-op day 4 right total knee arthroplasty. Improved quadriceps activation and neuromuscular control with ability to perform straight leg raises independently. Only slight quad lad at end of third set. Independent lifting lower extremity for mat transfers and exercise set up. Progressions made in active knee flexion and extension range of motion. Initiated gait training with single point cane with good performance and tolerance. Verbal cueing to promote heel-toe contact for knee extension. Provided updated HEP to include with initial program. Focused on tissue extensibility and knee range of motion to " avoid stiffness over the weekend.      Leah Is progressing well towards her goals.   Patient prognosis is Good.   Patient will continue to benefit from skilled outpatient physical therapy to address the deficits listed in the problem list box on initial evaluation, provide pt/family education and to maximize pt's level of independence in the home and community environment.     Patient's spiritual, cultural and educational needs considered and pt agreeable to plan of care and goals.  Anticipated barriers to physical therapy: Right knee osteoarthritis, range of motion limitations prior to surgery     Short Term Goals: 3 weeks   1.  Patient will demonstrate left knee ROM at least 0-5-90 degree. Progressing, not met   2.  Patient will demonstrate left quad strength via MicroFET of > 11 kg to improve transfers, gait and ADL performance. Progressing, not met   3.  Patient will report ability to sit with knee in symmetrical and comfortable knee flexed position. Progressing, not met      Long Term Goals: 6 weeks   1.  Patient will demonstrate ability to perform > 9 sit <> stand transfers w/ UE use for improved ability to stand from low surfaces. Progressing, not met   2.  Patient will demonstrate ability to negotiate a flight of stairs w/ 1 HR and reciprocal pattern. Progressing, not met   3.  Patient will perform TUG in < 14 seconds for improved negra and safety with ambulation. Progressing, not met   4.  Patient will demonstrate to PT comprehensive understanding of each HEP component without verbal cueing. Progressing, not met   5.  Patient will improve FOTO to > 55% to improve ADL performance. Progressing, not met     Plan     Regain extension. Attempt gait with less restrictive assistive device and standing exercises next.     Patt Wilder, PT, DPT

## 2024-02-05 ENCOUNTER — CLINICAL SUPPORT (OUTPATIENT)
Dept: REHABILITATION | Facility: HOSPITAL | Age: 59
End: 2024-02-05
Payer: COMMERCIAL

## 2024-02-05 DIAGNOSIS — M25.662 DECREASED RANGE OF MOTION (ROM) OF LEFT KNEE: Primary | ICD-10-CM

## 2024-02-05 DIAGNOSIS — Z74.09 DECREASED FUNCTIONAL MOBILITY AND ENDURANCE: ICD-10-CM

## 2024-02-05 PROCEDURE — 97140 MANUAL THERAPY 1/> REGIONS: CPT | Mod: PN

## 2024-02-05 PROCEDURE — 97110 THERAPEUTIC EXERCISES: CPT | Mod: PN

## 2024-02-06 ENCOUNTER — CLINICAL SUPPORT (OUTPATIENT)
Dept: REHABILITATION | Facility: HOSPITAL | Age: 59
End: 2024-02-06
Payer: COMMERCIAL

## 2024-02-06 DIAGNOSIS — Z74.09 DECREASED FUNCTIONAL MOBILITY AND ENDURANCE: ICD-10-CM

## 2024-02-06 DIAGNOSIS — M25.662 DECREASED RANGE OF MOTION (ROM) OF LEFT KNEE: Primary | ICD-10-CM

## 2024-02-06 PROCEDURE — 97110 THERAPEUTIC EXERCISES: CPT | Mod: PN

## 2024-02-06 PROCEDURE — 97112 NEUROMUSCULAR REEDUCATION: CPT | Mod: PN

## 2024-02-06 PROCEDURE — 97140 MANUAL THERAPY 1/> REGIONS: CPT | Mod: PN

## 2024-02-06 PROCEDURE — 97530 THERAPEUTIC ACTIVITIES: CPT | Mod: PN

## 2024-02-06 NOTE — PROGRESS NOTES
"[facility]   Physical Therapy Treatment Note      Name: Leah Minaya  Clinic Number: [id number]    Therapy Diagnosis:   Encounter Diagnoses   Name Primary?    Decreased range of motion (ROM) of left knee Yes    Decreased functional mobility and endurance      Physician: Mayur Rizzo MD    Visit Date: 2/6/2024    Evaluation Date: 1/31/2024  Authorization Period Expiration: 12/31/24  Plan of Care Expiration: 3/8/2024  Progress Note Due: 3/8/2024  Visit # / Visits authorized: 4/20 + 1   FOTO: 5/6  PTA Visit #: 0/5      Precautions: Standard; status post left TKA on 1/29/24 by Dr. Rizzo     Time In: 1003  Time Out: 1100  Total Appointment Time (timed & untimed codes): 57 minutes     Subjective     Patient reports: some increased knee pain today, particularly tightness on the medial aspect of her knee. Patient notes pain occasionally with contact of her sheets to her knee.     She was compliant with home exercise program.  Response to previous treatment: less pain in therapy than at home  Functional change: pain with home exercise program performance at home - performing heel prop 15' each hour  Ambulates with SPC    Pain: 0/10 left knee     Objective      2/6/2024    Knee active range of motion: 0-5-105 degrees left   Knee passive range of motion: 0-4-109 degrees left     Treatment     Leah received the treatments listed below:      Patient ambulates without assistive device throughout session**. Independent with increased left knee flexion in stance.    therapeutic exercises to develop strength, endurance, ROM, and flexibility for 26 minutes including objective:    Recumbent bike: Level 3, 5 minutes with partial to full revolutions for knee flexion active assisted range of motion     Long sitting heel slides: 15" -> 5"x20 left   Supine heel prop: 3' left   Supine straight leg raise: 2x10 left.     Heel raises: 2x10     neuromuscular re-education activities to improve: Kinesthetic for 10 " "minutes. The following activities were included:    Long sitting short arc quads: 5"x15 bilateral. Visual cues for quad contraction.  Long sitting quad sets: 5"x20 bilateral. Verbal and visual cues for isolated quad contraction.    manual therapy techniques: were applied to the: left knee for 12 minutes, including:    Grade II-III patellar mobilizations  Passive knee extension  Soft tissue mobilization around knee and to global thigh and knee - emphasis on hamstring and gastrocnemius     therapeutic activities to improve functional performance for 9 minutes, including:    Sit<>stands: 2x10 with bilateral upper extremity support on seat  Forward step ups: 4" step x10 left with unilateral upper extremity support    Patient Education and Home Exercises       Education provided:   - Continue home exercise program. Reduce heel prop time to 5' an hour. Perform this and hamstring/calf stretch (3x30" left) once an hour.   - Desensitization techniques with soft and course textures to decrease pain with contact against sheets.   - Working on challenging surgical knee in therapy. May continue stair navigation leading with contralateral knee if more comfortable.     Written Home Exercises Provided: 1/31/2024. Exercises were reviewed and Leah was able to demonstrate them prior to the end of the session.  Leah demonstrated good  understanding of the education provided. See Electronic Medical Record under Patient Instructions for exercises provided during therapy sessions    Assessment     Leah is a 58 y.o. female that presents to outpatient Physical Therapy >1 week post-op left total knee arthroplasty. Range of motion is steadily improving. Patient experiences more pain with extension and guards into flexion during gait and mat positioning. Added functional activities with definite need for upper extremity support. One or two breaks taken during each set of straight leg raise.     Leah Is progressing well towards her " goals.   Patient prognosis is Good.   Patient will continue to benefit from skilled outpatient physical therapy to address the deficits listed in the problem list box on initial evaluation, provide pt/family education and to maximize pt's level of independence in the home and community environment.     Patient's spiritual, cultural and educational needs considered and pt agreeable to plan of care and goals.  Anticipated barriers to physical therapy: Right knee osteoarthritis, range of motion limitations prior to surgery     Short Term Goals: 3 weeks   1.  Patient will demonstrate left knee ROM at least 0-5-90 degree. Progressing, not met   2.  Patient will demonstrate left quad strength via MicroFET of > 11 kg to improve transfers, gait and ADL performance. Progressing, not met   3.  Patient will report ability to sit with knee in symmetrical and comfortable knee flexed position. Progressing, not met      Long Term Goals: 6 weeks   1.  Patient will demonstrate ability to perform > 9 sit <> stand transfers w/ UE use for improved ability to stand from low surfaces. Progressing, not met   2.  Patient will demonstrate ability to negotiate a flight of stairs w/ 1 HR and reciprocal pattern. Progressing, not met   3.  Patient will perform TUG in < 14 seconds for improved negra and safety with ambulation. Progressing, not met   4.  Patient will demonstrate to PT comprehensive understanding of each HEP component without verbal cueing. Progressing, not met   5.  Patient will improve FOTO to > 55% to improve ADL performance. Progressing, not met     Plan     Regain extension. Increase flexion towards 110.   Attempt gait with less restrictive assistive device and standing exercises next.     Ivy Fernandez, PT, DPT, OCS

## 2024-02-07 ENCOUNTER — CLINICAL SUPPORT (OUTPATIENT)
Dept: REHABILITATION | Facility: HOSPITAL | Age: 59
End: 2024-02-07
Payer: COMMERCIAL

## 2024-02-07 DIAGNOSIS — Z74.09 DECREASED FUNCTIONAL MOBILITY AND ENDURANCE: ICD-10-CM

## 2024-02-07 DIAGNOSIS — M25.662 DECREASED RANGE OF MOTION (ROM) OF LEFT KNEE: Primary | ICD-10-CM

## 2024-02-07 PROCEDURE — 97110 THERAPEUTIC EXERCISES: CPT | Mod: PN

## 2024-02-07 PROCEDURE — 97112 NEUROMUSCULAR REEDUCATION: CPT | Mod: PN

## 2024-02-07 PROCEDURE — 97140 MANUAL THERAPY 1/> REGIONS: CPT | Mod: PN

## 2024-02-07 NOTE — PROGRESS NOTES
"[facility]   Physical Therapy Treatment Note      Name: Leah Minaya  Clinic Number: [id number]    Therapy Diagnosis:   Encounter Diagnoses   Name Primary?    Decreased range of motion (ROM) of left knee Yes    Decreased functional mobility and endurance      Physician: Mayur Rizzo MD    Visit Date: 2/7/2024    Evaluation Date: 1/31/2024  Authorization Period Expiration: 12/31/24  Plan of Care Expiration: 3/8/2024  Progress Note Due: 3/8/2024  Visit # / Visits authorized: 5/20 + 1   FOTO: 5/6  PTA Visit #: 0/5      Precautions: Standard; status post left TKA on 1/29/24 by Dr. Rizzo     Time In: 1100  Time Out: 1200  Total Appointment Time (timed & untimed codes): 60 minutes     Subjective     Patient reports: continued medial tightness in her knee. Patient notes lateral tightness at the end.     She was compliant with home exercise program.  Response to previous treatment: initiated light touch desensitization to knee  Functional change: reduced heel prop to 5' as instructed with decreased pain    Pain: 0/10 left knee     Objective      2/7/2024:     Knee active range of motion: 0-4-104 degrees left   Knee passive range of motion: 0-3-106 degrees left     Treatment     Leah received the treatments listed below:      Patient ambulates without assistive device throughout session**. Independent with increased left knee flexion in stance.    therapeutic exercises to develop strength, endurance, ROM, and flexibility for 31 minutes including objective:    Recumbent bike: Level 3, 5 minutes with partial to full revolutions for knee flexion active assisted range of motion     Long arc quads: 5 -> 3 lbs 5"x10 left. Verbal cues to stop backward trunk lean  Supine heel slides: 5" 2x10 left   Supine heel prop: 3' left     Heel raises: 3x10     neuromuscular re-education activities to improve: Kinesthetic for 11 minutes. The following activities were included:    Supine quad sets: 5"x20 left with " towel under distal thigh. Verbal and tactile cues for quad isolation   Supine straight leg raise: 2x10 left. Verbal and tactile cues for maintenance of quad contraction    manual therapy techniques: were applied to the: left knee for 18 minutes, including:  Grade II-III patellar mobilizations  Passive knee extension  Soft tissue mobilization to medial aspect of knee, hamstrings, and gastrocnemius     Patient Education and Home Exercises       Education provided:   - Continue home exercise program  - Desensitization techniques - add course textures to decrease pain with contact against sheets.     Written Home Exercises Provided: 1/31/2024. Exercises were reviewed and Leah was able to demonstrate them prior to the end of the session.  Leah demonstrated good  understanding of the education provided. See Electronic Medical Record under Patient Instructions for exercises provided during therapy sessions    Assessment     Leah is a 58 y.o. female that presents to outpatient Physical Therapy 1-2 weeks post-op left total knee arthroplasty. Range of motion is similar to last visit. Decreased medial knee pain after manual therapy; increased time devoted to this affecting therapeutic activities from last visit. Increased fatigue with long arc quad performance. Able to perform straight leg raises without break during set.     Leah Is progressing well towards her goals.   Patient prognosis is Good.   Patient will continue to benefit from skilled outpatient physical therapy to address the deficits listed in the problem list box on initial evaluation, provide pt/family education and to maximize pt's level of independence in the home and community environment.     Patient's spiritual, cultural and educational needs considered and pt agreeable to plan of care and goals.  Anticipated barriers to physical therapy: Right knee osteoarthritis, range of motion limitations prior to surgery     Short Term Goals: 3 weeks   1.   Patient will demonstrate left knee ROM at least 0-5-90 degree. Progressing, not met   2.  Patient will demonstrate left quad strength via MicroFET of > 11 kg to improve transfers, gait and ADL performance. Progressing, not met   3.  Patient will report ability to sit with knee in symmetrical and comfortable knee flexed position. Progressing, not met      Long Term Goals: 6 weeks   1.  Patient will demonstrate ability to perform > 9 sit <> stand transfers w/ UE use for improved ability to stand from low surfaces. Progressing, not met   2.  Patient will demonstrate ability to negotiate a flight of stairs w/ 1 HR and reciprocal pattern. Progressing, not met   3.  Patient will perform TUG in < 14 seconds for improved negra and safety with ambulation. Progressing, not met   4.  Patient will demonstrate to PT comprehensive understanding of each HEP component without verbal cueing. Progressing, not met   5.  Patient will improve FOTO to > 55% to improve ADL performance. Progressing, not met     Plan     Regain extension. Increase flexion to 110.   Progress closed chain activities and exercises as tolerated.     Ivy Fernandez, PT, DPT, OCS

## 2024-02-09 ENCOUNTER — CLINICAL SUPPORT (OUTPATIENT)
Dept: REHABILITATION | Facility: HOSPITAL | Age: 59
End: 2024-02-09
Payer: COMMERCIAL

## 2024-02-09 DIAGNOSIS — Z74.09 DECREASED FUNCTIONAL MOBILITY AND ENDURANCE: ICD-10-CM

## 2024-02-09 DIAGNOSIS — M25.662 DECREASED RANGE OF MOTION (ROM) OF LEFT KNEE: Primary | ICD-10-CM

## 2024-02-09 PROCEDURE — 97112 NEUROMUSCULAR REEDUCATION: CPT | Mod: PN

## 2024-02-09 PROCEDURE — 97110 THERAPEUTIC EXERCISES: CPT | Mod: PN

## 2024-02-09 PROCEDURE — 97140 MANUAL THERAPY 1/> REGIONS: CPT | Mod: PN
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
## 2024-02-12 ENCOUNTER — CLINICAL SUPPORT (OUTPATIENT)
Dept: REHABILITATION | Facility: HOSPITAL | Age: 59
End: 2024-02-12
Payer: COMMERCIAL

## 2024-02-12 DIAGNOSIS — Z74.09 DECREASED FUNCTIONAL MOBILITY AND ENDURANCE: ICD-10-CM

## 2024-02-12 DIAGNOSIS — M25.662 DECREASED RANGE OF MOTION (ROM) OF LEFT KNEE: Primary | ICD-10-CM

## 2024-02-12 PROCEDURE — 97140 MANUAL THERAPY 1/> REGIONS: CPT | Mod: PN

## 2024-02-12 PROCEDURE — 97110 THERAPEUTIC EXERCISES: CPT | Mod: PN

## 2024-02-12 PROCEDURE — 97112 NEUROMUSCULAR REEDUCATION: CPT | Mod: PN

## 2024-02-12 NOTE — PROGRESS NOTES
"[facility]   Physical Therapy Treatment Note      Name: Leah Minaya  Clinic Number: [id number]    Therapy Diagnosis:   Encounter Diagnoses   Name Primary?    Decreased range of motion (ROM) of left knee Yes    Decreased functional mobility and endurance      Physician: Mayur Rizzo MD    Visit Date: 2/12/2024    Evaluation Date: 1/31/2024  Authorization Period Expiration: 12/31/24  Plan of Care Expiration: 3/8/2024  Progress Note Due: 3/8/2024  Visit # / Visits authorized: 7/20 + 1   FOTO: 7/10  PTA Visit #: 0/5      Precautions: Standard; status post left TKA on 1/29/24 by Dr. Rizzo     Time In: 10:00 am  Time Out: 11:00 am   Total Appointment Time (timed & untimed codes): 60 minutes     Subjective     Patient reports: improved pain levels from previous session but continued knee pain at medial aspect of tibia. Propped hourly over the weekend, but flexion not so much.    She was compliant with home exercise program.  Response to previous treatment: improved pain levels  Functional change: improved incision sensitivity.    Pain: 3/10 left knee     Objective      2/12/2024  Knee active range of motion: 0-6-100 degrees (beginning of session)  Knee active range of motion: 0-3-105 degrees (after heel slide/prop)      Treatment     Leah received the treatments listed below:      therapeutic exercises to develop strength, endurance, ROM, and flexibility for 35 minutes including objective:    Recumbent bike: Level 3, 5 minutes with full revolutions for knee flexion active assisted range of motion   Supine heel slides: 1st round - 5" 2x10; 2nd round - 10x5"  Supine heel prop: 3' with quad sets with 5" hold - left   Mini squats: 10x   Heel raises: 3x10   Step lunge at 2nd step: 10x10"  Standing hip abduction: 2x10    Seated propped hamstring + gastroc stretch with strap: 10x10"    NEXT: step ups    neuromuscular re-education activities to improve: Kinesthetic for 10 minutes. The following " "activities were included:  Supine straight leg raise: 2x10 left. Verbal and tactile cues for maintenance of quad contraction  Long arc quads: 4 lbs 5"x15 left. Verbal cues to stop backward trunk lean    manual therapy techniques: were applied to the: left knee for 15 minutes, including:  Grade II-III patellar mobilizations  Passive knee extension  Soft tissue mobilization to quadriceps (emphasis on superior aspect of incision)    Patient Education and Home Exercises       Education provided:   - Continue home exercise program  - Desensitization techniques - add course textures to decrease pain with contact against sheets.     Written Home Exercises Provided: 1/31/2024. Exercises were reviewed and Leah was able to demonstrate them prior to the end of the session.  Leah demonstrated good  understanding of the education provided. See Electronic Medical Record under Patient Instructions for exercises provided during therapy sessions    Assessment     Leah is a 58 y.o. female that presents to outpatient Physical Therapy 2 weeks post-op left total knee arthroplasty. Scar tissue build up at superior aspect of incision that improved following soft tissue mobilizations. Progressed to more closed chain knee flexion loading to compare to open chain flexion tolerance. Tibia pain continues to be limiting factor. Suspect lack of compliance with knee range of motion exercises at home despite subjective report of good standing. Better tolerance to straight leg raises.    Leah Is progressing well towards her goals.   Patient prognosis is Good.   Patient will continue to benefit from skilled outpatient physical therapy to address the deficits listed in the problem list box on initial evaluation, provide pt/family education and to maximize pt's level of independence in the home and community environment.     Patient's spiritual, cultural and educational needs considered and pt agreeable to plan of care and " goals.  Anticipated barriers to physical therapy: Right knee osteoarthritis, range of motion limitations prior to surgery     Short Term Goals: 3 weeks   1.  Patient will demonstrate left knee ROM at least 0-5-90 degree. Progressing, not met   2.  Patient will demonstrate left quad strength via MicroFET of > 11 kg to improve transfers, gait and ADL performance. Progressing, not met   3.  Patient will report ability to sit with knee in symmetrical and comfortable knee flexed position. Progressing, not met      Long Term Goals: 6 weeks   1.  Patient will demonstrate ability to perform > 9 sit <> stand transfers w/ UE use for improved ability to stand from low surfaces. Progressing, not met   2.  Patient will demonstrate ability to negotiate a flight of stairs w/ 1 HR and reciprocal pattern. Progressing, not met   3.  Patient will perform TUG in < 14 seconds for improved negra and safety with ambulation. Progressing, not met   4.  Patient will demonstrate to PT comprehensive understanding of each HEP component without verbal cueing. Progressing, not met   5.  Patient will improve FOTO to > 55% to improve ADL performance. Progressing, not met     Plan     Regain extension. Increase flexion to 110.   Progress closed chain activities and exercises as tolerated.     Patt Wilder, PT, DPT, OCS

## 2024-02-14 ENCOUNTER — CLINICAL SUPPORT (OUTPATIENT)
Dept: REHABILITATION | Facility: HOSPITAL | Age: 59
End: 2024-02-14
Payer: COMMERCIAL

## 2024-02-14 DIAGNOSIS — Z74.09 DECREASED FUNCTIONAL MOBILITY AND ENDURANCE: ICD-10-CM

## 2024-02-14 DIAGNOSIS — M25.662 DECREASED RANGE OF MOTION (ROM) OF LEFT KNEE: Primary | ICD-10-CM

## 2024-02-14 PROCEDURE — 97110 THERAPEUTIC EXERCISES: CPT | Mod: PN

## 2024-02-14 PROCEDURE — 97140 MANUAL THERAPY 1/> REGIONS: CPT | Mod: PN

## 2024-02-14 NOTE — PROGRESS NOTES
"[facility]   Physical Therapy Treatment Note      Name: Leah Minaya  Clinic Number: [id number]    Therapy Diagnosis:   Encounter Diagnoses   Name Primary?    Decreased range of motion (ROM) of left knee Yes    Decreased functional mobility and endurance      Physician: Mayur Rizzo MD    Visit Date: 2024    Evaluation Date: 2024  Authorization Period Expiration: 24  Plan of Care Expiration: 3/8/2024  Progress Note Due: 3/8/2024  Visit # / Visits authorized:  + 1   FOTO: 7/10  PTA Visit #: 0/5      Precautions: Standard; status post left TKA on 24 by Dr. Rizzo     Time In: 10:00 am  Time Out: 11:00 am   Total Appointment Time (timed & untimed codes): 60 minutes     Subjective     Patient reports: continues with slight pain medially and posterior knee. Feels "an obstruction"  in back of the left knee with knee flexion exercise.     She was compliant with home exercise program.  Response to previous treatment: improved pain levels  Functional change: improved incision sensitivity.    Pain: 3/10 left knee     Objective      2024  Knee active range of motion: 0- degrees (after heel slide/prop)    TU seconds (ww), update 24:  13 seconds, no assistive device.  TUG Cutoff Scores: 13.5         30 second sit-to-stand test (without U/E support): 5x (unable without upper extremity), update 24: 7 reps with upper extremity support  30" sit to stand Cutoff Scores:15  MDC = 3.5       L/E Strength w/ MicroFET Muscle Leeroy Dynamometer Right Left Pain/Dysfunction with Movement   (approx 4 sec hold w/ max contraction) 24   Hip Flexion 7.8 kg  4 kg    18 R- 16 L  89%   Hip Abduction 8.6 kg  5.9 kg    15.1 R-  12 L   79%   Quadriceps 15.6 kg  3.4 kg    16.5 R- 11L    67%   Hamstrings 9.8 kg  3.5 kg   12.7 R- 7.5 L    59%     Treatment     Leah received the treatments listed below:      therapeutic exercises to develop strength, endurance, ROM, and " "flexibility for 45 minutes including objective:  Reassessment testing as above.  Recumbent bike: Level 3, 5 minutes with full revolutions for knee flexion active assisted range of motion   Supine heel slides: 1st round - 5" 1x10; 2nd round - 10x5"  Supine heel prop: 3' with quad sets with 5" hold - left ; second round: 3' with 5# above knee      NEXT:   Seated propped hamstring + gastroc stretch with strap: 10x10"  Heel raises: 3x10   Standing hip abduction: 2x10    step ups  Mini squats: 10x   Step lunge at 2nd step: 10x10"    manual therapy techniques: were applied to the: left knee for 15 minutes, including:  Grade II-III patellar mobilizations  Passive knee extension x 5'' hold x 5 reps  Passive hs stretch x 3 trials, 5-10'' hold  Soft tissue mobilization to quadriceps (medial), hamstrings, dorsiflexors      neuromuscular re-education activities to improve: Kinesthetic for 00 minutes. The following activities were included:  Supine straight leg raise: 2x10 left. Verbal and tactile cues for maintenance of quad contraction  Long arc quads: 4 lbs 5"x15 left. Verbal cues to stop backward trunk lean  Patient Education and Home Exercises       Education provided:   - Continue home exercise program  - Desensitization techniques - add course textures to decrease pain with contact against sheets.   - importance of regaining terminal extension in early stages of rehab, perform heel prop 1x/hr.    Written Home Exercises Provided: 1/31/2024. Exercises were reviewed and Leah was able to demonstrate them prior to the end of the session.  Leah demonstrated good  understanding of the education provided. See Electronic Medical Record under Patient Instructions for exercises provided during therapy sessions    Assessment     Leah is a 58 y.o. female that presents to outpatient Physical Therapy 2 weeks + 1 day post-op left total knee arthroplasty. Pt demonstrates functional improvement via improved Timed up and go, 30 " second sit<>stand scores and improved left knee flexion active range of motion. Strength is improving with left quad at 67% of right. Still lacking terminal knee extension showing regression today with active left knee extension range of motion. Asked pt to perform extension stretching (low-load, long duration) once an hour until we reach full extension. Educated pt on the importance of obtaining full extension in the early stages of recovery.  Continue plan of care to regain full active range of motion and strength in left knee.       Leah Is progressing well towards her goals.   Patient prognosis is Good.   Patient will continue to benefit from skilled outpatient physical therapy to address the deficits listed in the problem list box on initial evaluation, provide pt/family education and to maximize pt's level of independence in the home and community environment.     Patient's spiritual, cultural and educational needs considered and pt agreeable to plan of care and goals.  Anticipated barriers to physical therapy: Right knee osteoarthritis, range of motion limitations prior to surgery     Short Term Goals: 3 weeks   1.  Patient will demonstrate left knee ROM at least 0-5-90 degree. Progressing, not met   2.  Patient will demonstrate left quad strength via MicroFET of > 11 kg to improve transfers, gait and ADL performance. Progressing, not met   3.  Patient will report ability to sit with knee in symmetrical and comfortable knee flexed position. Progressing, not met      Long Term Goals: 6 weeks   1.  Patient will demonstrate ability to perform > 9 sit <> stand transfers w/ UE use for improved ability to stand from low surfaces. Progressing, not met   2.  Patient will demonstrate ability to negotiate a flight of stairs w/ 1 HR and reciprocal pattern. Progressing, not met   3.  Patient will perform TUG in < 14 seconds for improved negra and safety with ambulation. Met, 2/14   4.  Patient will demonstrate to PT  comprehensive understanding of each HEP component without verbal cueing. Progressing, not met   5.  Patient will improve FOTO to > 55% to improve ADL performance. Progressing, not met     Plan     Regain extension. Increase flexion to 110.   Progress closed chain activities and exercises as tolerated.     Stephanie Borrero, PT, DPT

## 2024-02-16 ENCOUNTER — CLINICAL SUPPORT (OUTPATIENT)
Dept: REHABILITATION | Facility: HOSPITAL | Age: 59
End: 2024-02-16
Payer: COMMERCIAL

## 2024-02-16 DIAGNOSIS — M25.662 DECREASED RANGE OF MOTION (ROM) OF LEFT KNEE: Primary | ICD-10-CM

## 2024-02-16 DIAGNOSIS — Z74.09 DECREASED FUNCTIONAL MOBILITY AND ENDURANCE: ICD-10-CM

## 2024-02-16 PROCEDURE — 97140 MANUAL THERAPY 1/> REGIONS: CPT | Mod: PN

## 2024-02-16 PROCEDURE — 97112 NEUROMUSCULAR REEDUCATION: CPT | Mod: PN

## 2024-02-16 PROCEDURE — 97110 THERAPEUTIC EXERCISES: CPT | Mod: PN

## 2024-02-16 PROCEDURE — 97530 THERAPEUTIC ACTIVITIES: CPT | Mod: PN

## 2024-02-16 NOTE — PROGRESS NOTES
"[facility]   Physical Therapy Treatment Note      Name: Leah Minaya  Clinic Number: [id number]    Therapy Diagnosis:   Encounter Diagnoses   Name Primary?    Decreased range of motion (ROM) of left knee Yes    Decreased functional mobility and endurance      Physician: Mayur Rizzo MD    Visit Date: 2/16/2024    Evaluation Date: 1/31/2024  Authorization Period Expiration: 12/31/24  Plan of Care Expiration: 3/8/2024  Progress Note Due: 3/8/2024  Visit # / Visits authorized: 9/20 + 1   FOTO: 10/11  PTA Visit #: 0/5      Precautions: Standard; status post left TKA on 1/29/24 by Dr. Rizzo     Time In: 1008  Time Out: 1102  Total Appointment Time (timed & untimed codes): 54 minutes     Subjective     Patient reports: feels a knot on the proximal lateral aspect of her incision. Patient thinks she worked it out in the waiting room. Patient feels like she needs her hands to perform sit<>stands    She was compliant with home exercise program.  Response to previous treatment: gradual improvement in pain  Functional change: improving motion    Pain: 2-3/10 left knee     Objective      2/16/2024:  Knee active range of motion: 0-4-110 degrees  Passive: 0-3-112 degrees     Treatment     Leah received the treatments listed below:      therapeutic exercises to develop strength, endurance, ROM, and flexibility for 18 minutes including objective:    Supine heel slides: 4' left   Supine heel prop: 10" 2x10 left with straps and slide board  Long sitting quad sets: 10"x10 left with towel under knee    Cybex hamstring curls - unilateral: 2.0 plates 2x10 left increase next  Cybex knee extension: 20 lbs 5" 2x10 double leg    manual therapy techniques: were applied to the: left knee for 14 minutes, including:    Soft tissue mobilization to medial and lateral knee, gastrocnemius and hamstrings  Grade II-III patellar mobilizations. Superior glide with passive knee extension    neuromuscular re-education " "activities to improve: Kinesthetic for 13 minutes. The following activities were included:    Long sitting quad sets: 10"x10 left with towel under knee  Supine quad set + straight leg raise: 3x10 left with 1/2 foam under knee  Terminal knee extension: Orange -> pink Cook band, 5"x10 left     therapeutic activities to improve functional performance for 9 minutes, including:    Forward step ups: 6" step 2x10 left with unilateral upper extremity support  Sit<>stands: x5 with bilateral upper extremity support   x5 with hands on therapist support (moderate -> minimal assistance)    Patient Education and Home Exercises       Education provided:   - Continue home exercise program    Written Home Exercises Provided: 1/31/2024. Exercises were reviewed and Leah was able to demonstrate them prior to the end of the session.  Leah demonstrated good  understanding of the education provided. See Electronic Medical Record under Patient Instructions for exercises provided during therapy sessions    Assessment     Leah is a 58 y.o. female that presents to outpatient Physical Therapy > 2 weeks post-op left total knee arthroplasty. Motion improved from last visit; unsure extension will improve much more considering bilateral knee flexion contracture prior to surgery. Holt with sit<>stands improved over reps although hesitant to perform without arm rests.      Leah Is progressing well towards her goals.   Patient prognosis is Good.   Patient will continue to benefit from skilled outpatient physical therapy to address the deficits listed in the problem list box on initial evaluation, provide pt/family education and to maximize pt's level of independence in the home and community environment.     Patient's spiritual, cultural and educational needs considered and pt agreeable to plan of care and goals.  Anticipated barriers to physical therapy: Right knee osteoarthritis, range of motion limitations prior to surgery "     Short Term Goals: 3 weeks   1.  Patient will demonstrate left knee ROM at least 0-5-90 degree. Progressing, not met   2.  Patient will demonstrate left quad strength via MicroFET of > 11 kg to improve transfers, gait and ADL performance. Progressing, not met   3.  Patient will report ability to sit with knee in symmetrical and comfortable knee flexed position. Progressing, not met      Long Term Goals: 6 weeks   1.  Patient will demonstrate ability to perform > 9 sit <> stand transfers w/ UE use for improved ability to stand from low surfaces. Progressing, not met   2.  Patient will demonstrate ability to negotiate a flight of stairs w/ 1 HR and reciprocal pattern. Progressing, not met   3.  Patient will perform TUG in < 14 seconds for improved negra and safety with ambulation. Met, 2/14   4.  Patient will demonstrate to PT comprehensive understanding of each HEP component without verbal cueing. Progressing, not met   5.  Patient will improve FOTO to > 55% to improve ADL performance. Progressing, not met     Plan     Maintain extension within 5 degrees. Increase flexion to 120.   Progress closed chain activities and exercises as tolerated.     Ivy Fernandez, PT, DPT, OCS

## 2024-02-19 ENCOUNTER — CLINICAL SUPPORT (OUTPATIENT)
Dept: REHABILITATION | Facility: HOSPITAL | Age: 59
End: 2024-02-19
Payer: COMMERCIAL

## 2024-02-19 DIAGNOSIS — M25.662 DECREASED RANGE OF MOTION (ROM) OF LEFT KNEE: Primary | ICD-10-CM

## 2024-02-19 DIAGNOSIS — Z74.09 DECREASED FUNCTIONAL MOBILITY AND ENDURANCE: ICD-10-CM

## 2024-02-19 PROCEDURE — 97530 THERAPEUTIC ACTIVITIES: CPT | Mod: PN

## 2024-02-19 PROCEDURE — 97110 THERAPEUTIC EXERCISES: CPT | Mod: PN

## 2024-02-19 PROCEDURE — 97112 NEUROMUSCULAR REEDUCATION: CPT | Mod: PN

## 2024-02-19 PROCEDURE — 97140 MANUAL THERAPY 1/> REGIONS: CPT | Mod: PN

## 2024-02-20 ENCOUNTER — OFFICE VISIT (OUTPATIENT)
Dept: ORTHOPEDICS | Facility: CLINIC | Age: 59
End: 2024-02-20
Payer: COMMERCIAL

## 2024-02-20 ENCOUNTER — RESEARCH ENCOUNTER (OUTPATIENT)
Dept: RESEARCH | Facility: HOSPITAL | Age: 59
End: 2024-02-20
Payer: COMMERCIAL

## 2024-02-20 VITALS
HEART RATE: 78 BPM | DIASTOLIC BLOOD PRESSURE: 88 MMHG | SYSTOLIC BLOOD PRESSURE: 175 MMHG | WEIGHT: 198.63 LBS | HEIGHT: 64 IN | BODY MASS INDEX: 33.91 KG/M2

## 2024-02-20 DIAGNOSIS — Z96.652 AFTERCARE FOLLOWING LEFT KNEE JOINT REPLACEMENT SURGERY: Primary | ICD-10-CM

## 2024-02-20 DIAGNOSIS — Z47.1 AFTERCARE FOLLOWING LEFT KNEE JOINT REPLACEMENT SURGERY: Primary | ICD-10-CM

## 2024-02-20 PROCEDURE — 99024 POSTOP FOLLOW-UP VISIT: CPT | Mod: S$GLB,,, | Performed by: ORTHOPAEDIC SURGERY

## 2024-02-20 PROCEDURE — 99999 PR PBB SHADOW E&M-EST. PATIENT-LVL III: CPT | Mod: PBBFAC,,, | Performed by: ORTHOPAEDIC SURGERY

## 2024-02-20 RX ORDER — CYCLOBENZAPRINE HCL 5 MG
5 TABLET ORAL NIGHTLY
Qty: 14 TABLET | Refills: 0 | Status: SHIPPED | OUTPATIENT
Start: 2024-02-20 | End: 2024-03-05

## 2024-02-20 RX ORDER — GABAPENTIN 300 MG/1
600 CAPSULE ORAL 2 TIMES DAILY
Qty: 56 CAPSULE | Refills: 0 | Status: SHIPPED | OUTPATIENT
Start: 2024-02-20 | End: 2024-03-03 | Stop reason: SDUPTHER

## 2024-02-20 NOTE — PROGRESS NOTES
[facility] [address]          Subjective:     Patient ID: Leah Minaya is a 58 y.o. female.    Chief Complaint: No chief complaint on file.    Patient is 3 weeks s/p  left primary total knee replacement  Anterior knee pain: No  Has improved pain  Is in physical therapy  Yes  Problems w incision  No  Is  happy with result  Yes  Opiod free: Yes    Doing well, still ambulating with a cane.    Past Medical History:   Diagnosis Date    Benign essential HTN     Bilateral carpal tunnel syndrome 5/8/2017    History of prediabetes     Mixed hyperlipidemia 5/8/2017    Primary osteoarthritis of knees, bilateral     Vitamin D deficiency         Past Surgical History:   Procedure Laterality Date    none      TOTAL KNEE ARTHROPLASTY Left 1/29/2024    Procedure: ARTHROPLASTY, KNEE, TOTAL monoblock;  Surgeon: Mayur Rizzo MD;  Location: [facility] OR;  Service: Orthopedics;  Laterality: Left;  celldawson and prinannetteo for bmi>40, smokers, diabetics, and if on blood thinners, per Elida and Dr. Rizzo, this is a LEFT knee        Current Outpatient Medications   Medication Instructions    acetaminophen (TYLENOL) 325 mg, Oral, Every 4 hours    albuterol 90 mcg/actuation inhaler 2 puffs, Inhalation, Every 4 hours PRN    amLODIPine (NORVASC) 5 mg, Oral, Daily    aspirin (ECOTRIN) 81 mg, Oral, 2 times daily    cetirizine (ZYRTEC) 10 mg, Oral, Daily    clindamycin phosphate 1% (CLINDAGEL) 1 % gel Topical (Top), 2 times daily    cyclobenzaprine (FLEXERIL) 5 MG tablet  TAKE ONE TABLET BY MOUTH NIGHTLY AS NEEDED FOR MUSCLE SPASMS    diclofenac (VOLTAREN) 75 mg, Oral, 2 times daily    famotidine (PEPCID) 20 mg, Oral, 2 times daily    fluticasone propionate (FLONASE) 50 mcg, Each Nostril, Daily    hydroCHLOROthiazide (HYDRODIURIL) 25 MG tablet TAKE 1 TABLET BY MOUTH ONCE DAILY    hydroquinone 2 % Crea Apply topically as needed.    ibuprofen (ADVIL,MOTRIN) 200 mg, Oral    metronidazole 1% (METROGEL) 1 % Gel Topical (Top), Daily    mirabegron  (MYRBETRIQ) 25 mg Tb24 ER tablet Oral    mometasone 0.1% (ELOCON) 0.1 % cream Topical (Top), Daily    MYRBETRIQ 50 mg Tb24 1 tablet, Oral, Daily    tranexamic acid (LYSTEDA) 650 mg, Oral, 2 times daily    triamcinolone acetonide 0.1% (KENALOG) 0.1 % cream Topical (Top), 2 times daily        Review of patient's allergies indicates:  No Known Allergies    Social History     Socioeconomic History    Marital status: Single   Tobacco Use    Smoking status: Never    Smokeless tobacco: Never   Substance and Sexual Activity    Alcohol use: No    Drug use: No    Sexual activity: Yes   Social History Narrative    Works in 's Office at [employer], also works at [employer] part-time.       Family History   Problem Relation Age of Onset    Breast cancer Mother         dx at 52    Hepatitis Father         autoimmune    Hypertension Father     Alcohol abuse Brother     Hypertension Brother            Objective:   Physical Exam:     Skin atraumatic   Surgical incision healing well  ROM 0 to 95  Stable anterior/posterior   Stable varus/valgus  Motor intact TA/GS/EHL/FHL  SILT SP/DP/Sa/Damon/T  Toes WWP    Imaging: No new     Assessment:        Leah Minaya is a 58 y.o. female who is now 3 weeks status post left TKA doing well.  No diagnosis found.    Plan :        In therapy  Follow up 3 months        No orders of the defined types were placed in this encounter.       Pratibha Cohen MD   02/20/2024

## 2024-02-20 NOTE — PROGRESS NOTES
Protocol: [employer] (Raj)  Protocol#:Raj  IRB# 2021.156  Version Date: 10-Clement-2023  PI: Mayur Rizzo MD  Patient Initials: [email or web address]. (Study ID# [id number])      Clinic Visit     Patient was met in clinic by research personnel and orthopedics provider. Patient previously had TKA to target knee (left). No injections or other interventions were given at this appointment. Patient's pain score was a 4. She is doing well post LT TKA but has some issues with muscle stiffness and trouble sleeping due to knee. Dr. Rizzo prescribed Flexeril and Gabapentin. Patient continues to take diclofenac and aspirin. She will follow up with Dr. Rizzo in 3 months.      Patient instructed to continue study questionnaires and to call research personnel if they have any questions. Currently in the weekly phase of study.

## 2024-02-21 ENCOUNTER — CLINICAL SUPPORT (OUTPATIENT)
Dept: REHABILITATION | Facility: HOSPITAL | Age: 59
End: 2024-02-21
Payer: COMMERCIAL

## 2024-02-21 DIAGNOSIS — M25.662 DECREASED RANGE OF MOTION (ROM) OF LEFT KNEE: Primary | ICD-10-CM

## 2024-02-21 DIAGNOSIS — Z74.09 DECREASED FUNCTIONAL MOBILITY AND ENDURANCE: ICD-10-CM

## 2024-02-21 PROCEDURE — 97110 THERAPEUTIC EXERCISES: CPT | Mod: PN

## 2024-02-21 PROCEDURE — 97140 MANUAL THERAPY 1/> REGIONS: CPT | Mod: PN

## 2024-02-21 PROCEDURE — 97530 THERAPEUTIC ACTIVITIES: CPT | Mod: PN

## 2024-02-21 NOTE — PROGRESS NOTES
"[facility]   Physical Therapy Treatment Note      Name: Leah Minaya  Clinic Number: [id number]    Therapy Diagnosis:   Encounter Diagnoses   Name Primary?    Decreased range of motion (ROM) of left knee Yes    Decreased functional mobility and endurance      Physician: Mayur Rizzo MD    Visit Date: 2/21/2024    Evaluation Date: 1/31/2024  Authorization Period Expiration: 12/31/24  Plan of Care Expiration: 3/8/2024  Progress Note Due: 3/8/2024  Visit # / Visits authorized: 11/20 + 1   FOTO: 2nd completed 2/19/2024. Third to be administered at discharge visit.   PTA Visit #: 0/5      Precautions: Standard; status post left TKA on 1/29/24 by Dr. Rizzo     Time In: 1006  Time Out: 1104  Total Appointment Time (timed & untimed codes): 58 minutes     Subjective     Patient reports: She visited Dr. Rizzo yesterday and reports he did not have any complaints about current progress with knee. Patient plans to return to work in mid-March. Patient received 2 prescriptions to help with pain and sleep and will pick them up after today's visit    She was compliant with home exercise program.  Response to previous treatment: decreased pain  Functional change: improved sleep last night    Pain: 0/10 left knee     Objective       2/21:  Knee active range of motion: 0-5-115 degrees left   Passive: 0-4-116 degrees left !     Treatment     Leah received the treatments listed below:      therapeutic exercises to develop strength, endurance, ROM, and flexibility for 36 minutes including objective:    Recumbent bike: 5' at level 5 and 40+ RPM for knee flexion active assisted range of motion and lower extremity endurance      Supine heel slides: 5"x10 left with straps and slide board  Long sitting hamstring and gastrocnemius stretch: 5x30" left with strap. Additional 3 reps before measurement     Cybex knee extension - unilateral: 10 lbs 5" 2x10 left   Cybex hamstring curls - unilateral: 3.0 plates 2x15 left " "    manual therapy techniques: were applied to the: left knee for 6 minutes, including:    Passive knee flexion/extension  Grade II-III patellar mobilizations    neuromuscular re-education activities to improve: Kinesthetic for 8 minutes. The following activities were included:    Straight leg raise: 3x15 left   Cybex leg press: 5.0 plates, 5" extension hold, 2x15    therapeutic activities to improve functional performance for 8 minutes, including:    Forward step ups: 6" step 2x10 left without upper extremity support  Sit<>stands -> chair squats: x10     Patient Education and Home Exercises       Education provided:   - Continue home exercise program. Flexion is progressing well. Continue to work on extension.   - Appropriate to discharge assistive device.     Written Home Exercises Provided: 1/31/2024. Exercises were reviewed and Leah was able to demonstrate them prior to the end of the session.  Leah demonstrated good  understanding of the education provided. See Electronic Medical Record under Patient Instructions for exercises provided during therapy sessions    Assessment     Leah is a 58 y.o. female that presents to outpatient Physical Therapy 3 weeks post-op left total knee arthroplasty. Knee flexion nearing full. Extension range 1 degree improved from last visit, meeting functional range. Progressed machine exercises and functional activities with increased fatigue.    Leah Is progressing well towards her goals.   Patient prognosis is Good.   Patient will continue to benefit from skilled outpatient physical therapy to address the deficits listed in the problem list box on initial evaluation, provide pt/family education and to maximize pt's level of independence in the home and community environment.     Patient's spiritual, cultural and educational needs considered and pt agreeable to plan of care and goals.  Anticipated barriers to physical therapy: Right knee osteoarthritis, range of motion " limitations prior to surgery     Short Term Goals: 3 weeks   1.  Patient will demonstrate left knee ROM at least 0-5-90 degree. Progressing, not met   2.  Patient will demonstrate left quad strength via MicroFET of > 11 kg to improve transfers, gait and ADL performance. Progressing, not met   3.  Patient will report ability to sit with knee in symmetrical and comfortable knee flexed position. Progressing, not met      Long Term Goals: 6 weeks   1.  Patient will demonstrate ability to perform > 9 sit <> stand transfers w/ UE use for improved ability to stand from low surfaces. Progressing, not met   2.  Patient will demonstrate ability to negotiate a flight of stairs w/ 1 HR and reciprocal pattern. Progressing, not met   3.  Patient will perform TUG in < 14 seconds for improved negra and safety with ambulation. Met, 2/14   4.  Patient will demonstrate to PT comprehensive understanding of each HEP component without verbal cueing. Progressing, not met   5.  Patient will improve FOTO to > 55% to improve ADL performance. Progressing, not met     Plan     Maintain extension within 5 degrees. Increase flexion to 120.   Progress closed chain activities and exercises as tolerated.     Ivy Fernandez, PT, DPT, OCS

## 2024-02-23 ENCOUNTER — CLINICAL SUPPORT (OUTPATIENT)
Dept: REHABILITATION | Facility: HOSPITAL | Age: 59
End: 2024-02-23
Payer: COMMERCIAL

## 2024-02-23 DIAGNOSIS — M25.662 DECREASED RANGE OF MOTION (ROM) OF LEFT KNEE: Primary | ICD-10-CM

## 2024-02-23 DIAGNOSIS — Z74.09 DECREASED FUNCTIONAL MOBILITY AND ENDURANCE: ICD-10-CM

## 2024-02-23 PROCEDURE — 97110 THERAPEUTIC EXERCISES: CPT | Mod: PN

## 2024-02-23 PROCEDURE — 97530 THERAPEUTIC ACTIVITIES: CPT | Mod: PN

## 2024-02-23 PROCEDURE — 97112 NEUROMUSCULAR REEDUCATION: CPT | Mod: PN

## 2024-02-23 PROCEDURE — 97140 MANUAL THERAPY 1/> REGIONS: CPT | Mod: PN

## 2024-02-23 NOTE — PROGRESS NOTES
"[facility]   Physical Therapy Treatment Note      Name: Leah Minaya  Clinic Number: [id number]    Therapy Diagnosis:   Encounter Diagnoses   Name Primary?    Decreased range of motion (ROM) of left knee Yes    Decreased functional mobility and endurance      Physician: Mayur Rizzo MD    Visit Date: 2/23/2024    Evaluation Date: 1/31/2024  Authorization Period Expiration: 12/31/24  Plan of Care Expiration: 3/8/2024  Progress Note Due: 3/8/2024  Visit # / Visits authorized: 12/20 + 1   FOTO: 2nd completed 2/19/2024. Third to be administered at discharge visit.   PTA Visit #: 0/5      Precautions: Standard; status post left TKA on 1/29/24 by Dr. Rizzo     Time In: 1014 (late arrival)  Time Out: 1104  Total Appointment Time (timed & untimed codes): 50 minutes     Subjective     Patient reports: she started new medicines which make her sleepy. Patient reports discrepancy between what pharmacist and Dr. Rizzo said regarding how many to take.     She was compliant with home exercise program.  Response to previous treatment: no issues  Functional change: less use of single point cane     Pain: 0/10 left knee     Objective       2/23:  Knee active range of motion: 0-4-109 degrees left    Passive: 0-3-113 degrees left !      Treatment     Leah received the treatments listed below:      therapeutic exercises to develop strength, endurance, ROM, and flexibility for 13 minutes including objective:    Recumbent bike: Out of time (resume next at 5' at level 5 and 50+ RPM for knee flexion active assisted range of motion and lower extremity endurance)    Long sitting hamstring and gastrocnemius stretch: Out of time, next before manual therapy  Supine heel slides: 5"x10 left with straps and slide board    Cybex knee extension - unilateral: 10 lbs 5" 2x10 left   Cybex hamstring curls - unilateral: 3.0 plates 2x15 left     manual therapy techniques: were applied to the: left knee for 16 minutes, " "including:    Passive knee flexion/extension  Grade II-III patellar mobilizations  Soft tissue mobilization to hamstring and gastrocnemius     neuromuscular re-education activities to improve: Kinesthetic and balance for 8 minutes. The following activities were included:    Cybex leg press: 5.0 plates, 5" extension hold, 2x15  Single leg balance: 5"x5 left     therapeutic activities to improve functional performance for 13 minutes, including:    Forward step ups: 6" step x20 left without upper extremity support  Lateral step downs: 4" step 2x10 left with bilateral upper extremity support  Chair squats: x20     Patient Education and Home Exercises       Education provided:   - Continue home exercise program  - Contact Dr. Rizzo's office for medication clarification    Written Home Exercises Provided: 1/31/2024. Exercises were reviewed and Leah was able to demonstrate them prior to the end of the session.  Leah demonstrated good  understanding of the education provided. See Electronic Medical Record under Patient Instructions for exercises provided during therapy sessions    Assessment     Leah is a 58 y.o. female that presents to outpatient Physical Therapy 3 weeks post-op left total knee arthroplasty. Mild improvement  flexion nearing full. Extension range 1 degree improved from last visit which is functional. Mild regression in flexion range, but is still progressing appropriately. Improved functional activity performance.    Leah Is progressing well towards her goals.   Patient prognosis is Good.   Patient will continue to benefit from skilled outpatient physical therapy to address the deficits listed in the problem list box on initial evaluation, provide pt/family education and to maximize pt's level of independence in the home and community environment.     Patient's spiritual, cultural and educational needs considered and pt agreeable to plan of care and goals.  Anticipated barriers to physical " therapy: Right knee osteoarthritis, range of motion limitations prior to surgery     Short Term Goals: 3 weeks   1.  Patient will demonstrate left knee ROM at least 0-5-90 degree. Progressing, not met   2.  Patient will demonstrate left quad strength via MicroFET of > 11 kg to improve transfers, gait and ADL performance. Progressing, not met   3.  Patient will report ability to sit with knee in symmetrical and comfortable knee flexed position. Progressing, not met      Long Term Goals: 6 weeks   1.  Patient will demonstrate ability to perform > 9 sit <> stand transfers w/ UE use for improved ability to stand from low surfaces. Progressing, not met   2.  Patient will demonstrate ability to negotiate a flight of stairs w/ 1 HR and reciprocal pattern. Progressing, not met   3.  Patient will perform TUG in < 14 seconds for improved negra and safety with ambulation. Met, 2/14   4.  Patient will demonstrate to PT comprehensive understanding of each HEP component without verbal cueing. Progressing, not met   5.  Patient will improve FOTO to > 55% to improve ADL performance. Progressing, not met     Plan     Maintain extension within 5 degrees. Increase flexion to 120.   Progress closed chain activities and exercises as tolerated.     Ivy Fernandez, PT, DPT, OCS

## 2024-02-23 NOTE — PROGRESS NOTES
I assume primary medical responsibility for this patient. I have reviewed the history, physical, and assessement & treatment plan with the resident and agree that the care is reasonable and necessary. This service has been performed by a resident without the presence of a teaching physician under the primary care exception. If necessary, an addendum of additional findings or evaluation beyond the resident documentation will be noted below.     Mirtha Levi MD

## 2024-02-26 ENCOUNTER — CLINICAL SUPPORT (OUTPATIENT)
Dept: REHABILITATION | Facility: HOSPITAL | Age: 59
End: 2024-02-26
Payer: COMMERCIAL

## 2024-02-26 DIAGNOSIS — M25.662 DECREASED RANGE OF MOTION (ROM) OF LEFT KNEE: Primary | ICD-10-CM

## 2024-02-26 DIAGNOSIS — Z74.09 DECREASED FUNCTIONAL MOBILITY AND ENDURANCE: ICD-10-CM

## 2024-02-26 PROCEDURE — 97530 THERAPEUTIC ACTIVITIES: CPT | Mod: PN

## 2024-02-26 PROCEDURE — 97112 NEUROMUSCULAR REEDUCATION: CPT | Mod: PN

## 2024-02-26 PROCEDURE — 97140 MANUAL THERAPY 1/> REGIONS: CPT | Mod: PN

## 2024-02-26 PROCEDURE — 97110 THERAPEUTIC EXERCISES: CPT | Mod: PN

## 2024-02-26 NOTE — PROGRESS NOTES
"[facility]   Physical Therapy Treatment Note      Name: Leah Minaya  Clinic Number: [id number]    Therapy Diagnosis:   Encounter Diagnoses   Name Primary?    Decreased range of motion (ROM) of left knee Yes    Decreased functional mobility and endurance        Physician: Mayur Rizzo MD    Visit Date: 2/26/2024    Evaluation Date: 1/31/2024  Authorization Period Expiration: 12/31/24  Plan of Care Expiration: 3/8/2024  Progress Note Due: 3/8/2024  Visit # / Visits authorized: 13/20 + 1   FOTO: 2nd completed 2/19/2024. Third to be administered at discharge visit.   PTA Visit #: 0/5      Precautions: Standard; status post left TKA on 1/29/24 by Dr. Rizzo     Time In: 11:03 am   Time Out: 12:00 pm  Total Appointment Time (timed & untimed codes): 57 minutes     Subjective     Patient reports: walking without SPC at home with no falls. Continued medial knee pain with some instances of sharp, nerve-like pain    She was compliant with home exercise program.  Response to previous treatment: no issues  Functional change: ambulation without SPC in home; continuing to use in community    Pain: 0/10 left knee     Objective         2/26/2024  Knee active range of motion: 0-3-100 degrees    Passive: 0-2-105 degrees     Treatment     Leah received the treatments listed below:      therapeutic exercises to develop strength, endurance, ROM, and flexibility for 27 minutes including objective:    Recumbent bike: Out of time (resume next at 5' at level 5 and 50+ RPM for knee flexion active assisted range of motion and lower extremity endurance)    Long sitting hamstring and gastrocnemius stretch: 10x10"  Supine heel slides: 5"x10 left with straps and slide board    Cybex knee extension - unilateral: 10 lbs 5" 2x10 left (unable to complete last 5 reps)   *perform TB knee extensions prior next visit  Cybex hamstring curls - unilateral: 3.0 plates 2x15 left     manual therapy techniques: were applied to the: " "left knee for 10 minutes, including:    Passive knee flexion/extension  Grade II-III patellar mobilizations    neuromuscular re-education activities to improve: Kinesthetic and balance for 10 minutes. The following activities were included:    Cybex leg press: 6.0 plates, 5" extension hold, 2x15  Single leg balance: 10"x5 left     therapeutic activities to improve functional performance for 10 minutes, including:    Forward step ups: 6" step x20 left without upper extremity support  Lateral step downs: 4" step 2x10 left with bilateral upper extremity support  Chair squats: x20 regressed from no upper extremity to finger tip support    Patient Education and Home Exercises       Education provided:   - Continue home exercise program  - Contact Dr. Rizzo's office for medication clarification    Written Home Exercises Provided: 1/31/2024. Exercises were reviewed and Leah was able to demonstrate them prior to the end of the session.  Leah demonstrated good  understanding of the education provided. See Electronic Medical Record under Patient Instructions for exercises provided during therapy sessions    Assessment     Leah is a 58 y.o. female that presents to outpatient Physical Therapy 4 weeks post-op left total knee arthroplasty. Regression in knee flexion by 4 degrees secondary to pain and discomfort. Extension maintained. Increased discomfort with single leg lower extremity strengthening compared to previous visit. Despite this, Patient able to ambulate throughout clinic without single point cane.      Leah Is progressing well towards her goals.   Patient prognosis is Good.   Patient will continue to benefit from skilled outpatient physical therapy to address the deficits listed in the problem list box on initial evaluation, provide pt/family education and to maximize pt's level of independence in the home and community environment.     Patient's spiritual, cultural and educational needs considered and pt " agreeable to plan of care and goals.  Anticipated barriers to physical therapy: Right knee osteoarthritis, range of motion limitations prior to surgery     Short Term Goals: 3 weeks   1.  Patient will demonstrate left knee ROM at least 0-5-90 degree. Progressing, not met   2.  Patient will demonstrate left quad strength via MicroFET of > 11 kg to improve transfers, gait and ADL performance. Progressing, not met   3.  Patient will report ability to sit with knee in symmetrical and comfortable knee flexed position. Progressing, not met      Long Term Goals: 6 weeks   1.  Patient will demonstrate ability to perform > 9 sit <> stand transfers w/ UE use for improved ability to stand from low surfaces. Progressing, not met   2.  Patient will demonstrate ability to negotiate a flight of stairs w/ 1 HR and reciprocal pattern. Progressing, not met   3.  Patient will perform TUG in < 14 seconds for improved negra and safety with ambulation. Met, 2/14   4.  Patient will demonstrate to PT comprehensive understanding of each HEP component without verbal cueing. Progressing, not met   5.  Patient will improve FOTO to > 55% to improve ADL performance. Progressing, not met     Plan     Maintain extension within 5 degrees. Increase flexion to 120.   Progress closed chain activities and exercises as tolerated.     Patt Wilder, PT, DPT

## 2024-02-28 ENCOUNTER — CLINICAL SUPPORT (OUTPATIENT)
Dept: REHABILITATION | Facility: HOSPITAL | Age: 59
End: 2024-02-28
Payer: COMMERCIAL

## 2024-02-28 DIAGNOSIS — M25.662 DECREASED RANGE OF MOTION (ROM) OF LEFT KNEE: Primary | ICD-10-CM

## 2024-02-28 DIAGNOSIS — Z74.09 DECREASED FUNCTIONAL MOBILITY AND ENDURANCE: ICD-10-CM

## 2024-02-28 PROCEDURE — 97110 THERAPEUTIC EXERCISES: CPT | Mod: PN

## 2024-02-28 PROCEDURE — 97530 THERAPEUTIC ACTIVITIES: CPT | Mod: PN

## 2024-02-28 PROCEDURE — 97140 MANUAL THERAPY 1/> REGIONS: CPT | Mod: PN

## 2024-02-28 NOTE — PROGRESS NOTES
"[facility]   Physical Therapy Treatment Note      Name: eLah Minaya  Clinic Number: [id number]    Therapy Diagnosis:   Encounter Diagnoses   Name Primary?    Decreased range of motion (ROM) of left knee Yes    Decreased functional mobility and endurance      Physician: Mayur Rizzo MD    Visit Date: 2/28/2024    Evaluation Date: 1/31/2024  Authorization Period Expiration: 12/31/24  Plan of Care Expiration: 3/8/2024  Progress Note Due: 3/8/2024  Visit # / Visits authorized: 14/20 + 1   FOTO: 2nd completed 2/19/2024. Third to be administered at discharge visit.   PTA Visit #: 0/5      Precautions: Standard; status post left TKA on 1/29/24 by Dr. Rizzo     Time In: 10:10 am   Time Out: 11:00 am  Total Appointment Time (timed & untimed codes): 50 minutes ( arrived late)    Subjective     Patient reports: improved medial knee pain. Was in the hospital all night with her son having kidney stones, so she is really tired and doesn't know if she will be able to give 100%.    She was compliant with home exercise program.  Response to previous treatment: no issues  Functional change: ambulation without SPC in home; continuing to use in community    Pain: 3/10 left knee     Objective       2/28/2024  Passive range of motion: 0-5-108 degrees   After manual: 0-5-111 degrees  AAROM: 0-3-110 degrees with heel slides and propping  AROM: 0-3-108 degrees     Treatment     Leah received the treatments listed below:      therapeutic exercises to develop strength, endurance, ROM, and flexibility for 20 minutes including objective:    Recumbent bike: 5' at level 5 and 50+ RPM for knee flexion active assisted range of motion and lower extremity endurance  Supine heel slides: 5"x15; 5"x10 left with straps and slide board  Heel prop: 3 minutes with 2# ankle weight. Unable to tolerate full time with ankle weight due to !  Knee extensions: blue - 3x15 with 5" hold. Regressed from Cybex due to !. Unable to " "tolerate black theraband due to !    Out of time:  Cybex knee extension - unilateral: 10 lbs 5" 2x10 left   Cybex hamstring curls - unilateral: 3.0 plates 2x15 left     manual therapy techniques: were applied to the: left knee for 20 minutes, including:  Passive knee flexion/extension  Grade III-IV patellar mobilizations - emphasis on inferior   Inferior patellar glides with posterior tibial mobilizations in knee flexion in 90 degrees     therapeutic activities to improve functional performance for 10 minutes, including:    Cybex leg press: 6.0 plates, 5" extension hold, 2x15  Staggered sit to stands: 10x from teal chair with upper extremity support. Unable with no upper extremity support.    Lack of time:  Forward step ups: 6" step x20 left without upper extremity support  Lateral step downs: 4" step 2x10 left with bilateral upper extremity support  Chair squats: x20. Regressed from no upper extremity to finger tip support    Patient Education and Home Exercises       Education provided:   - Continue home exercise program  - Contact Dr. Rizzo's office for medication clarification    Written Home Exercises Provided: 1/31/2024. Exercises were reviewed and Leah was able to demonstrate them prior to the end of the session.  Leah demonstrated good  understanding of the education provided. See Electronic Medical Record under Patient Instructions for exercises provided during therapy sessions    Assessment     Leah is a 58 y.o. female that presents to outpatient Physical Therapy 4 weeks post-op left total knee arthroplasty. Session limited to patient arriving late and fatigued from lack of sleep night prior. Improvements noted in knee flexion passive range of motion by 6 degrees from last visit. Extension stable at 3 degrees. Maximal encouragement necessary for performance throughout. Trouble distinguishing if patient is experiencing pain with exercise performance or just difficulty secondary to facial grimacing. " Claims to have trouble weight-bearing today despite ambulating without single point cane during last session. Knee flexion range of motion remains most limiting.      Laeh Is progressing well towards her goals.   Patient prognosis is Good.   Patient will continue to benefit from skilled outpatient physical therapy to address the deficits listed in the problem list box on initial evaluation, provide pt/family education and to maximize pt's level of independence in the home and community environment.     Patient's spiritual, cultural and educational needs considered and pt agreeable to plan of care and goals.  Anticipated barriers to physical therapy: Right knee osteoarthritis, range of motion limitations prior to surgery     Short Term Goals: 3 weeks   1.  Patient will demonstrate left knee ROM at least 0-5-90 degree. Progressing, not met   2.  Patient will demonstrate left quad strength via MicroFET of > 11 kg to improve transfers, gait and ADL performance. Progressing, not met   3.  Patient will report ability to sit with knee in symmetrical and comfortable knee flexed position. Progressing, not met      Long Term Goals: 6 weeks   1.  Patient will demonstrate ability to perform > 9 sit <> stand transfers w/ UE use for improved ability to stand from low surfaces. Progressing, not met   2.  Patient will demonstrate ability to negotiate a flight of stairs w/ 1 HR and reciprocal pattern. Progressing, not met   3.  Patient will perform TUG in < 14 seconds for improved negra and safety with ambulation. Met, 2/14   4.  Patient will demonstrate to PT comprehensive understanding of each HEP component without verbal cueing. Progressing, not met   5.  Patient will improve FOTO to > 55% to improve ADL performance. Progressing, not met     Plan     Maintain extension within 5 degrees. Increase flexion to 120.   Progress closed chain activities and exercises as tolerated.     Patt Wilder, PT, DPT

## 2024-03-04 ENCOUNTER — CLINICAL SUPPORT (OUTPATIENT)
Dept: REHABILITATION | Facility: HOSPITAL | Age: 59
End: 2024-03-04
Payer: COMMERCIAL

## 2024-03-04 DIAGNOSIS — M25.662 DECREASED RANGE OF MOTION (ROM) OF LEFT KNEE: Primary | ICD-10-CM

## 2024-03-04 DIAGNOSIS — Z74.09 DECREASED FUNCTIONAL MOBILITY AND ENDURANCE: ICD-10-CM

## 2024-03-04 PROCEDURE — 97140 MANUAL THERAPY 1/> REGIONS: CPT | Mod: PN

## 2024-03-04 PROCEDURE — 97530 THERAPEUTIC ACTIVITIES: CPT | Mod: PN

## 2024-03-04 PROCEDURE — 97110 THERAPEUTIC EXERCISES: CPT | Mod: PN

## 2024-03-04 RX ORDER — GABAPENTIN 300 MG/1
600 CAPSULE ORAL 2 TIMES DAILY
Qty: 56 CAPSULE | Refills: 0 | Status: SHIPPED | OUTPATIENT
Start: 2024-03-04 | End: 2024-03-18

## 2024-03-04 NOTE — PROGRESS NOTES
"[facility]   Physical Therapy Treatment Note      Name: Leah Minaya  Clinic Number: [id number]    Therapy Diagnosis:   Encounter Diagnoses   Name Primary?    Decreased range of motion (ROM) of left knee Yes    Decreased functional mobility and endurance      Physician: Mayur Rizzo MD    Visit Date: 3/4/2024    Evaluation Date: 1/31/2024  Authorization Period Expiration: 12/31/24  Plan of Care Expiration: 3/8/2024  Progress Note Due: 3/8/2024  Visit # / Visits authorized: 15/20 + 1   FOTO: 2nd completed 2/19/2024. Third to be administered at discharge visit.   PTA Visit #: 0/5      Precautions: Standard; status post left TKA on 1/29/24 by Dr. Rizzo     Time In: 1013 (late arrival)  Time Out: 1102  Total Appointment Time (timed & untimed codes): 49 minutes    Subjective     Patient reports: she is late from warding another dog out of her backyard.     She was compliant with home exercise program.  Response to previous treatment: soreness  Functional change: has performed independent community ambulation a few times    Pain: 3/10 left medial knee     Objective       3/4/2024:  Left knee range of motion: 0-4-110 degrees active, 0-3-111 degrees passive     Treatment     Leah received the treatments listed below:      therapeutic exercises to develop strength, endurance, ROM, and flexibility for 14 minutes including objective:    Recumbent bike: 5' at level 5 and 50+ RPM for knee flexion active assisted range of motion and lower extremity endurance    Cybex knee extension - unilateral: 10 lbs 3"x12 left   Cybex hamstring curls - unilateral: 3.0 plates 2x15 left    Knee extension stretch: 3x30" left with therapist overpressure    manual therapy techniques: were applied to the: left knee for 11 minutes, including:    Grade III-IV patellar mobilizations - emphasis on superior   Removal of 2 loose stitches with sterile tweezers at proximal scar. Sprayed with wound cleanser, dried with sterile " "gauze, and covered with Band-Aids    therapeutic activities to improve functional performance for 24 minutes, including:    Cybex leg press: 6.0 plates, 3" extension hold x20    Forward lunge: 2x10 bilateral with unilateral upper extremity support  Unsupported sit<>stands: 3x10  Forward step ups: 6" step 2x10 left without upper extremity support. Verbal cues to prevent contralateral lower extremity push off  Lateral step ups: 6" step 2x10 left without upper extremity support. Verbal cues to prevent contralateral lower extremity push off    Patient Education and Home Exercises       Education provided:   - Continue home exercise program    Written Home Exercises Provided: 1/31/2024. Exercises were reviewed and Leah was able to demonstrate them prior to the end of the session.  Leah demonstrated good  understanding of the education provided. See Electronic Medical Record under Patient Instructions for exercises provided during therapy sessions    Assessment     Leah is a 58 y.o. female that presents to outpatient Physical Therapy 5 weeks post-op left total knee arthroplasty. Motion is functional; stiffer flexion today. Difficulty and limited flexion during lunges. Good loading of left lower extremity during step exercises after cues. Session slightly limited to late arrival.     Leah Is progressing well towards her goals.   Patient prognosis is Good.   Patient will continue to benefit from skilled outpatient physical therapy to address the deficits listed in the problem list box on initial evaluation, provide pt/family education and to maximize pt's level of independence in the home and community environment.     Patient's spiritual, cultural and educational needs considered and pt agreeable to plan of care and goals.  Anticipated barriers to physical therapy: Right knee osteoarthritis, range of motion limitations prior to surgery     Short Term Goals: 3 weeks   1.  Patient will demonstrate left knee ROM " at least 0-5-90 degree. Progressing, not met   2.  Patient will demonstrate left quad strength via MicroFET of > 11 kg to improve transfers, gait and ADL performance. Progressing, not met   3.  Patient will report ability to sit with knee in symmetrical and comfortable knee flexed position. Progressing, not met      Long Term Goals: 6 weeks   1.  Patient will demonstrate ability to perform > 9 sit <> stand transfers w/ UE use for improved ability to stand from low surfaces. Progressing, not met   2.  Patient will demonstrate ability to negotiate a flight of stairs w/ 1 HR and reciprocal pattern. Progressing, not met   3.  Patient will perform TUG in < 14 seconds for improved negra and safety with ambulation. Met, 2/14   4.  Patient will demonstrate to PT comprehensive understanding of each HEP component without verbal cueing. Progressing, not met   5.  Patient will improve FOTO to > 55% to improve ADL performance. Progressing, not met     Plan     Maintain extension within 5 degrees. Increase flexion towards 120.   Progress closed chain activities and exercises as tolerated.   Discharge planning over the next 2 weeks.     Ivy Fernandez, PT, DPT, OCS

## 2024-03-06 ENCOUNTER — CLINICAL SUPPORT (OUTPATIENT)
Dept: REHABILITATION | Facility: HOSPITAL | Age: 59
End: 2024-03-06
Payer: COMMERCIAL

## 2024-03-06 DIAGNOSIS — Z74.09 DECREASED FUNCTIONAL MOBILITY AND ENDURANCE: ICD-10-CM

## 2024-03-06 DIAGNOSIS — M25.662 DECREASED RANGE OF MOTION (ROM) OF LEFT KNEE: Primary | ICD-10-CM

## 2024-03-06 PROCEDURE — 97530 THERAPEUTIC ACTIVITIES: CPT | Mod: PN

## 2024-03-06 PROCEDURE — 97110 THERAPEUTIC EXERCISES: CPT | Mod: PN

## 2024-03-06 NOTE — PROGRESS NOTES
"[facility]   Physical Therapy Treatment Note      Name: Leah Minaya  Clinic Number: [id number]    Therapy Diagnosis:   Encounter Diagnoses   Name Primary?    Decreased range of motion (ROM) of left knee Yes    Decreased functional mobility and endurance      Physician: Mayur Rizzo MD    Visit Date: 3/6/2024    Evaluation Date: 2024  Authorization Period Expiration: 24  Plan of Care Expiration: 3/8/2024  Progress Note Due: 3/8/2024  Visit # / Visits authorized:    FOTO: 2nd completed 2024. Third to be administered at discharge visit.   PTA Visit #: 0/5      Precautions: Standard; status post left TKA on 24 by Dr. Rizzo     Time In: 1007 (late arrival)  Time Out: 1100  Total Appointment Time (timed & untimed codes): 53 minutes    Subjective     Patient reports: she has to remind herself to use each leg equally.     She was compliant with home exercise program.  Response to previous treatment: fatigue  Functional change: none compared to last visit (using cane less in community)    Pain: 0/10 left medial knee     Objective       3/6/2024:  Left knee range of motion: 0-3-110 degrees active, 0-2-111 degrees passive. Empty flexion end feel    Treatment     Leah received the treatments listed below:      therapeutic exercises to develop strength, endurance, ROM, and flexibility for 15 minutes including objective:    Recumbent bike: 5' at level 5 and 50+ RPM for knee flexion active assisted range of motion and lower extremity endurance    Heel slides: 5"x10 left with straps and slide board  Prone han lbs, 3' left     Cybex hamstring curls - unilateral: 3.0 plates, 3x15 left     therapeutic activities to improve functional performance for 38 minutes, including:    Cybex leg press: 7.0 plates, 5" extension hold x20    Forward lunge: 2x10 bilateral with unilateral upper extremity support. Verbal cues to increase depth  Reciprocal 6" stair navigation - no upper " "extremity support on ascent, unilateral upper extremity support on descent: 3 rounds  Forward step ups: 6" step 2x10 left without upper extremity support. Verbal cues to prevent contralateral lower extremity push off  Lateral step downs: 6" step -> 4" step, 2x10 left with bilateral upper extremity support. Verbal cues to tap vs fully weight shift to right lower extremity   Single leg balance: 5"x5. Attempts < 5" not counted    Patient Education and Home Exercises       Education provided:   - Continue home exercise program    Written Home Exercises Provided: 1/31/2024. Exercises were reviewed and Leah was able to demonstrate them prior to the end of the session.  Leah demonstrated good  understanding of the education provided. See Electronic Medical Record under Patient Instructions for exercises provided during therapy sessions    Assessment     Leah is a 58 y.o. female that presents to outpatient Physical Therapy 5 weeks post-op left total knee arthroplasty. Stiff knee extension at arrival that improved 1 degree compared to last visit after prone hang. Knee flexion slow to improve over the last 2+ weeks. Need for repeated cues to promote full weightbearing or flexion depth during functional activities.    Leah Is progressing well towards her goals.   Patient prognosis is Good.   Patient will continue to benefit from skilled outpatient physical therapy to address the deficits listed in the problem list box on initial evaluation, provide pt/family education and to maximize pt's level of independence in the home and community environment.     Patient's spiritual, cultural and educational needs considered and pt agreeable to plan of care and goals.  Anticipated barriers to physical therapy: Right knee osteoarthritis, range of motion limitations prior to surgery     Short Term Goals: 3 weeks   1.  Patient will demonstrate left knee ROM at least 0-5-90 degree. Progressing, not met   2.  Patient will " demonstrate left quad strength via MicroFET of > 11 kg to improve transfers, gait and ADL performance. Progressing, not met   3.  Patient will report ability to sit with knee in symmetrical and comfortable knee flexed position. Progressing, not met      Long Term Goals: 6 weeks   1.  Patient will demonstrate ability to perform > 9 sit <> stand transfers w/ UE use for improved ability to stand from low surfaces. Progressing, not met   2.  Patient will demonstrate ability to negotiate a flight of stairs w/ 1 HR and reciprocal pattern. Progressing, not met   3.  Patient will perform TUG in < 14 seconds for improved negra and safety with ambulation. Met, 2/14   4.  Patient will demonstrate to PT comprehensive understanding of each HEP component without verbal cueing. Progressing, not met   5.  Patient will improve FOTO to > 55% to improve ADL performance. Progressing, not met     Plan     Maintain extension within 5 degrees. Increase flexion to 115+  Progress closed chain activities and exercises as tolerated.   Discharge planning over the next 2 weeks.     Ivy Fernandez, PT, DPT, OCS

## 2024-03-08 ENCOUNTER — TELEPHONE (OUTPATIENT)
Dept: ORTHOPEDICS | Facility: CLINIC | Age: 59
End: 2024-03-08
Payer: COMMERCIAL

## 2024-03-08 NOTE — TELEPHONE ENCOUNTER
----- Message from Christie Garibay sent at 3/8/2024  8:45 AM CST -----  Type:  Patient Returning Call    Who Called:Pt   Would the patient rather a call back or a response via [facility]? Call back   Best Call Back Number:[phone]  Additional Information: would like to speak with Leah in ref to her return to work paperwork .. Need it to be worded a certain way

## 2024-03-08 NOTE — TELEPHONE ENCOUNTER
Spoke with patient regarding a doctor's note to go back to work on Tuesday 3/12/24 with no restriction. Patient will  note from office on Monday.

## 2024-03-11 ENCOUNTER — CLINICAL SUPPORT (OUTPATIENT)
Dept: REHABILITATION | Facility: HOSPITAL | Age: 59
End: 2024-03-11
Payer: COMMERCIAL

## 2024-03-11 DIAGNOSIS — M25.662 DECREASED RANGE OF MOTION (ROM) OF LEFT KNEE: Primary | ICD-10-CM

## 2024-03-11 DIAGNOSIS — Z74.09 DECREASED FUNCTIONAL MOBILITY AND ENDURANCE: ICD-10-CM

## 2024-03-11 PROCEDURE — 97530 THERAPEUTIC ACTIVITIES: CPT | Mod: PN

## 2024-03-11 PROCEDURE — 97110 THERAPEUTIC EXERCISES: CPT | Mod: PN

## 2024-03-11 NOTE — PROGRESS NOTES
[facility]   Physical Therapy Treatment Note      Name: Leah Minaya  Clinic Number: [id number]    Therapy Diagnosis:   Encounter Diagnoses   Name Primary?    Decreased range of motion (ROM) of left knee Yes    Decreased functional mobility and endurance      Physician: No ref. provider found    Visit Date: 3/11/2024    Evaluation Date: 1/31/2024  Authorization Period Expiration: 12/31/24  Plan of Care Expiration: 3/8/2024; 3/29/2024  Progress Note Due: 3/8/2024  Visit # / Visits authorized: 17/20 + 1   FOTO: 2nd completed 2/19/2024. Third to be administered at discharge visit.   PTA Visit #: 0/5      Precautions: Standard; status post left TKA on 1/29/24 by Dr. Rizzo     Time In: 11:05 am  Time Out: 12:05 am   Total Appointment Time (timed & untimed codes): 60 minutes    Subjective     Patient reports: planning on returning to work tomorrow potentially. She is going to [employer] membership after to discuss joining gym. Thigh starts burning when standing in line for a long time.     She was compliant with home exercise program.  Response to previous treatment: fatigued  Functional change: potential return to work; walking improving weekly; more mobile     Pain: 0/10 left medial knee     Objective       3/11/2024:  Left knee range of motion: 0-4-111 degrees active, 0-2-112 degrees passive. Empty flexion end feel.    L/E Strength w/ MicroFET Muscle Leeroy Dynamometer Right Left Pain/Dysfunction with Movement   (approx 4 sec hold w/ max contraction)   Hip Flexion 7.8-- 11.0 kg  4 -- 8.9 kg      Hip Abduction 8.6-- 12.1 kg  5.9 -- 12.1 kg      Quadriceps 15.6--16.9 kg  3.4 -- 11.4 kg      Hamstrings 9.8 -- 11.4 kg  3.5-- 7.0 kg           Treatment     Leah received the treatments listed below:      therapeutic exercises to develop strength, endurance, ROM, and flexibility for 20 minutes including objective:    Recumbent bike: 5' at level 5 and 50+ RPM for knee flexion active assisted  "range of motion and lower extremity endurance. - Not Today     Prone han lbs, 3' left   Heel slides: 10"x10 left with straps and slide board    Cybex hamstring curls - unilateral: 3.0 plates, 3x15 left   Cybex knee extension - unilateral: 10#, 2x15 left     therapeutic activities to improve functional performance for 40 minutes, including:    Objective tests and measures    Cybex leg press: 7.0 plates, 5" extension hold x20    Single leg balance: 9-10"x5. Attempts < 5" not counted  Forward step ups: 6" step 2x10 left without upper extremity support. Verbal cues to prevent contralateral lower extremity push off  Reciprocal 6" stair navigation - no upper extremity support on ascent, unilateral/no upper extremity support on descent: 6 rounds  Lateral step downs: 6" step  15x left with bilateral upper extremity support. Verbal cues to tap vs fully weight shift to right lower extremity   Forward lunge: 2x10 bilateral with unilateral upper extremity support. Verbal cues to increase depth      Patient Education and Home Exercises       Education provided:   - Continue home exercise program    Written Home Exercises Provided: 2024. Exercises were reviewed and Leah was able to demonstrate them prior to the end of the session.  Leah demonstrated good  understanding of the education provided. See Electronic Medical Record under Patient Instructions for exercises provided during therapy sessions    Assessment     Leah is a 58 y.o. female that presents to outpatient Physical Therapy 6 weeks post-op left total knee arthroplasty. Left quadriceps and hamstring <75% strength of right lower extremity. Continued difficulty with eccentric quadriceps control and single lower extremity closed chain loading. Continued poor tolerance to full left lower extremity weight-bearing. Suspect knee flexion is nearing full potential at 110 degrees secondary to empty end feel. However, range has improved over the past two weeks. " Able to perform reciprocal stair negotiation without upper extremity support. Will continue therapy to focus on left quadriceps/hamstring strength and tolerance to loading for functional activity performance.     Leah Is progressing well towards her goals.   Patient prognosis is Good.   Patient will continue to benefit from skilled outpatient physical therapy to address the deficits listed in the problem list box on initial evaluation, provide pt/family education and to maximize pt's level of independence in the home and community environment.     Patient's spiritual, cultural and educational needs considered and pt agreeable to plan of care and goals.  Anticipated barriers to physical therapy: Right knee osteoarthritis, range of motion limitations prior to surgery     Short Term Goals: 3 weeks   1.  Patient will demonstrate left knee ROM at least 0-5-90 degree. Met prior to 3/11/2024  2.  Patient will demonstrate left quad strength via MicroFET of > 11 kg to improve transfers, gait and ADL performance. Met 3/11/2024  3.  Patient will report ability to sit with knee in symmetrical and comfortable knee flexed position. Met 3/11/2024      Long Term Goals: 6 weeks   1.  Patient will demonstrate ability to perform > 9 sit <> stand transfers w/ UE use for improved ability to stand from low surfaces. Progressing, not met   2.  Patient will demonstrate ability to negotiate a flight of stairs w/ 1 HR and reciprocal pattern. Met 3/11/2024  3.  Patient will perform TUG in < 14 seconds for improved negra and safety with ambulation. Met, 2/14   4.  Patient will demonstrate to PT comprehensive understanding of each HEP component without verbal cueing. Progressing, not met   5.  Patient will improve FOTO to > 55% to improve ADL performance. Progressing, not met  6. Patient will be able to perform lunge pain-free to improve tolerance to squatting activities. Updated on 3/11/2024     Plan     Maintain extension within 5  degrees. Increase flexion to 115+  Progress closed chain activities and exercises as tolerated.   Discharge planning over the next 2 weeks.     Patt Wilder, PT, DPT

## 2024-03-11 NOTE — PLAN OF CARE
[facility]  Physical Therapy Plan of Care Note     Name: Leah Minaya  Clinic Number: [id number]    Therapy Diagnosis:   Encounter Diagnoses   Name Primary?    Decreased range of motion (ROM) of left knee Yes    Decreased functional mobility and endurance      Physician: No ref. provider found    Visit Date: 3/11/2024    Physician Orders: Eval and Treat   Medical Diagnosis from Referral:   M25.562,G89.29 (ICD-10-CM) - Chronic pain of left knee   M17.12 (ICD-10-CM) - Primary osteoarthritis of left knee   M62.81 (ICD-10-CM) - Quadriceps weakness     Evaluation Date: 1/31/2024  Authorization Period Expiration: 12/31/24  Plan of Care Expiration: 3/8/2024; 3/29/2024  Progress Note Due: 3/8/2024  Visit # / Visits authorized: 17/20 + 1   FOTO: 2nd completed 2/19/2024. Third to be administered at discharge visit.   PTA Visit #: 0/5      Precautions: Standard; status post left TKA on 1/29/24 by Dr. Rizzo  Functional Level Prior to Evaluation:  independent     SUBJECTIVE     Update: planning on returning to work tomorrow potentially. She is going to [employer] membership after to discuss joining gym. Thigh starts burning when standing in line for a long time.      OBJECTIVE     Update:   3/11/2024:  Left knee range of motion: 0-4-111 degrees active, 0-2-112 degrees passive. Empty flexion end feel.     L/E Strength w/ MicroFET Muscle Leeroy Dynamometer Right Left Pain/Dysfunction with Movement   (approx 4 sec hold w/ max contraction)   Hip Flexion 7.8-- 11.0 kg  4 -- 8.9 kg      Hip Abduction 8.6-- 12.1 kg  5.9 -- 12.1 kg      Quadriceps 15.6--16.9 kg  3.4 -- 11.4 kg      Hamstrings 9.8 -- 11.4 kg  3.5-- 7.0 kg          ASSESSMENT     Update:   Leah is a 58 y.o. female that presents to outpatient Physical Therapy 6 weeks post-op left total knee arthroplasty. Left quadriceps and hamstring <75% strength of right lower extremity. Continued difficulty with eccentric quadriceps control and single  lower extremity closed chain loading. Continued poor tolerance to full left lower extremity weight-bearing. Suspect knee flexion is nearing full potential at 110 degrees secondary to empty end feel. However, range has improved over the past two weeks. Able to perform reciprocal stair negotiation without upper extremity support. Will continue therapy to focus on left quadriceps/hamstring strength and tolerance to loading for functional activity performance.     Previous Short Term Goals Status:   progressing, not met   New Short Term Goals Status:   Met all 3/11/2024   Long Term Goal Status: continue per initial plan of care  Reasons for Recertification of Therapy:   see assessment     GOALS  Short Term Goals: 3 weeks   1.  Patient will demonstrate left knee ROM at least 0-5-90 degree. Met prior to 3/11/2024  2.  Patient will demonstrate left quad strength via MicroFET of > 11 kg to improve transfers, gait and ADL performance. Met 3/11/2024  3.  Patient will report ability to sit with knee in symmetrical and comfortable knee flexed position. Met 3/11/2024      Long Term Goals: 6 weeks   1.  Patient will demonstrate ability to perform > 9 sit <> stand transfers w/ UE use for improved ability to stand from low surfaces. Progressing, not met   2.  Patient will demonstrate ability to negotiate a flight of stairs w/ 1 HR and reciprocal pattern. Met 3/11/2024  3.  Patient will perform TUG in < 14 seconds for improved negra and safety with ambulation. Met, 2/14   4.  Patient will demonstrate to PT comprehensive understanding of each HEP component without verbal cueing. Progressing, not met   5.  Patient will improve FOTO to > 55% to improve ADL performance. Progressing, not met  6. Patient will be able to perform lunge pain-free to improve tolerance to squatting activities. Updated on 3/11/2024     PLAN     Updated Certification Period: 3/11/2024 to 3/29/2024   Recommended Treatment Plan: 2 times per week for 2-3 weeks:   Gait Training, Manual Therapy, Moist Heat/ Ice, Neuromuscular Re-ed, Patient Education, Self Care, Therapeutic Activities, and Therapeutic Exercise  Other Recommendations: compliance with HEP. Quadriceps strengthening.    Patt Wilder, PT

## 2024-03-13 ENCOUNTER — CLINICAL SUPPORT (OUTPATIENT)
Dept: REHABILITATION | Facility: HOSPITAL | Age: 59
End: 2024-03-13
Payer: COMMERCIAL

## 2024-03-13 DIAGNOSIS — Z74.09 DECREASED FUNCTIONAL MOBILITY AND ENDURANCE: ICD-10-CM

## 2024-03-13 DIAGNOSIS — M25.662 DECREASED RANGE OF MOTION (ROM) OF LEFT KNEE: Primary | ICD-10-CM

## 2024-03-13 PROCEDURE — 97112 NEUROMUSCULAR REEDUCATION: CPT | Mod: PN

## 2024-03-13 PROCEDURE — 97530 THERAPEUTIC ACTIVITIES: CPT | Mod: PN

## 2024-03-13 PROCEDURE — 97110 THERAPEUTIC EXERCISES: CPT | Mod: PN

## 2024-03-13 NOTE — PROGRESS NOTES
"[facility]   Physical Therapy Treatment Note      Name: Leah Minaya  Clinic Number: [id number]    Therapy Diagnosis:   Encounter Diagnoses   Name Primary?    Decreased range of motion (ROM) of left knee Yes    Decreased functional mobility and endurance      Physician: Mayur Rizzo MD    Visit Date: 3/13/2024    Evaluation Date: 1/31/2024  Authorization Period Expiration: 12/31/24  Plan of Care Expiration: 3/29/2024  Progress Note Due: 3/8/2024  Visit # / Visits authorized: 18/20 + 1   FOTO: 2nd completed 2/19/2024. Third to be administered at discharge visit.   PTA Visit #: 0/5      Precautions: Standard; status post left TKA on 1/29/24 by Dr. Rizzo     Time In: 1009  Time Out: 1100  Total Appointment Time (timed & untimed codes): 51 minutes    Subjective     Patient reports: left medial knee pain persists. Increased pain at work while twisting right in chair while left heel was propped.    She was compliant with home exercise program.  Response to previous treatment: fatigued  Functional change: returned to work; maintaining compliance with flexion and extension range exercises      Pain: 0/10 left medial knee     Objective       Objective Measures updated at progress report unless specified.      Treatment     Leah received the treatments listed below:      therapeutic exercises to develop strength, endurance, ROM, and flexibility for 10 minutes including:    Recumbent bike: 5' at level 5. Seat pushed forward 2x for knee flexion active assisted range of motion    Supine active heel slides with static cup applied to medial knee: x10. 2 rounds    therapeutic activities to improve functional performance for 33 minutes, including:    Walking loops: 5 laps at beginning of session. Verbal cues for heel to toe pattern with knee extension and flexion respectively     4 laps at end of session. Verbal cues to increase pace    Sit<>stands: Standard chair + Airex foam, x10  Reciprocal 6" stair " "navigation: 4 steps, 4 rounds with unilateral upper extremity support   Forward lunge: 2x10 bilateral with unilateral upper extremity support. Verbal cues for pain free depth    neuromuscular re-education activities to improve: Balance and Proprioception for 9 minutes. The following activities were included:    Lateral lunge: x10 bilateral without upper extremity support  Single leg balance: 5"x5 left  10-12" x 5 left. Attempts < 10" not counted    Patient Education and Home Exercises       Education provided:   - Continue home exercise program    Written Home Exercises Provided: 1/31/2024. Exercises were reviewed and Leah was able to demonstrate them prior to the end of the session.  Leah demonstrated good  understanding of the education provided. See Electronic Medical Record under Patient Instructions for exercises provided during therapy sessions    Assessment     Leah is a 58 y.o. female that presents to outpatient Physical Therapy 6 weeks post-op left total knee arthroplasty. Added support to stair navigation and reduced depth of lunges and sit<>stands to promote left closed chain loading without compensation or pain. Potential scar tissue restrictions to proximal medial scar. More equal negra bilaterally during walking loops at end of session.     Leah Is progressing well towards her goals.   Patient prognosis is Good.   Patient will continue to benefit from skilled outpatient physical therapy to address the deficits listed in the problem list box on initial evaluation, provide pt/family education and to maximize pt's level of independence in the home and community environment.     Patient's spiritual, cultural and educational needs considered and pt agreeable to plan of care and goals.  Anticipated barriers to physical therapy: Right knee osteoarthritis, range of motion limitations prior to surgery     Short Term Goals: 3 weeks   1.  Patient will demonstrate left knee ROM at least 0-5-90 " degree. Met prior to 3/11/2024  2.  Patient will demonstrate left quad strength via MicroFET of > 11 kg to improve transfers, gait and ADL performance. Met 3/11/2024  3.  Patient will report ability to sit with knee in symmetrical and comfortable knee flexed position. Met 3/11/2024      Long Term Goals: 6 weeks   1.  Patient will demonstrate ability to perform > 9 sit <> stand transfers w/ UE use for improved ability to stand from low surfaces. Progressing, not met   2.  Patient will demonstrate ability to negotiate a flight of stairs w/ 1 HR and reciprocal pattern. Met 3/11/2024  3.  Patient will perform TUG in < 14 seconds for improved negra and safety with ambulation. Met, 2/14   4.  Patient will demonstrate to PT comprehensive understanding of each HEP component without verbal cueing. Progressing, not met   5.  Patient will improve FOTO to > 55% to improve ADL performance. Progressing, not met  6. Patient will be able to perform lunge pain-free to improve tolerance to squatting activities. Updated on 3/11/2024     Plan     Maintain extension within 5 degrees. Increase flexion to 115+  Progress closed chain activities and exercises as tolerated.   Discharge planning over the next 2 weeks.     Ivy Fernandez, PT, DPT, OCS

## 2024-03-18 ENCOUNTER — CLINICAL SUPPORT (OUTPATIENT)
Dept: REHABILITATION | Facility: HOSPITAL | Age: 59
End: 2024-03-18
Payer: COMMERCIAL

## 2024-03-18 DIAGNOSIS — Z74.09 DECREASED FUNCTIONAL MOBILITY AND ENDURANCE: ICD-10-CM

## 2024-03-18 DIAGNOSIS — M25.662 DECREASED RANGE OF MOTION (ROM) OF LEFT KNEE: Primary | ICD-10-CM

## 2024-03-18 PROCEDURE — 97140 MANUAL THERAPY 1/> REGIONS: CPT | Mod: PN

## 2024-03-18 PROCEDURE — 97112 NEUROMUSCULAR REEDUCATION: CPT | Mod: PN

## 2024-03-18 PROCEDURE — 97110 THERAPEUTIC EXERCISES: CPT | Mod: PN

## 2024-03-18 PROCEDURE — 97530 THERAPEUTIC ACTIVITIES: CPT | Mod: PN

## 2024-03-18 NOTE — PROGRESS NOTES
"[facility]   Physical Therapy Treatment Note      Name: Leah Minaya  Clinic Number: [id number]    Therapy Diagnosis:   Encounter Diagnoses   Name Primary?    Decreased range of motion (ROM) of left knee Yes    Decreased functional mobility and endurance      Physician: Mayur Rizzo MD    Visit Date: 3/18/2024    Evaluation Date: 1/31/2024  Authorization Period Expiration: 12/31/24  Plan of Care Expiration: 3/29/2024  Progress Note Due: 3/8/2024  Visit # / Visits authorized: 18/20 + 1   FOTO: 2nd completed 2/19/2024. Third to be administered at discharge visit.   PTA Visit #: 0/5      Precautions: Standard; status post left TKA on 1/29/24 by Dr. Rizzo     Time In: 11:05 am  Time Out: 12:05 pm   Total Appointment Time (timed & untimed codes): 60 minutes    Subjective     Patient reports: continued intense stiffness in the mornings. Feels like biking needs to be part of her daily routine. Thinking about getting a bike. Non-operated knee is becoming very painful and the bigger problem.    She was compliant with home exercise program.  Response to previous treatment: fatigued  Functional change: returned to work; having trouble with stiffness due to sitting at work; taking standing breaks     Pain: 2/10 left medial knee pain and stiffness    Objective       Objective Measures updated at progress report unless specified.      3/18/2024  Gait analysis: knee flexion present in stance and swing phase on non-operated lower extremity.     Treatment     Leah received the treatments listed below:      therapeutic exercises to develop strength, endurance, ROM, and flexibility for 20 minutes including:    Recumbent bike: 5' at level 5. Seat 8.    Stair lunge: 5x10" left  Cybex hamstring curls: 3 plates 2x12 left  Cybex knee extension: 15# x10; 10# 2x15 left    Supine active heel slides with static cup applied to medial knee: x10 with therapist overpressure    manual therapy techniques: Joint " "mobilizations and Soft tissue Mobilization were applied for 08 minutes, including:    Soft tissue mobilization to scar tissue, emphasis at proximal aspect of incision    therapeutic activities to improve functional performance for 23 minutes, including:    Forward lunge: 2x10 bilateral with unilateral upper extremity support. Verbal cues for pain free depth  Sit<>stands: Standard chair + Airex foam, x5 with upper extremity support; x15 with thigh support  Split squats: 4" step - x10 with bilateral upper extremity support  Reciprocal 6" stair navigation: 4 steps, 4 rounds with unilateral upper extremity support     neuromuscular re-education activities to improve: Balance and Proprioception for 9 minutes. The following activities were included:    Lateral lunge: x10 bilateral without upper extremity support  Single leg balance: 5"x5 left  10-15" x 5 left. Attempts < 10" not counted.    Patient Education and Home Exercises       Education provided:   - Continue home exercise program    Written Home Exercises Provided: 1/31/2024. Exercises were reviewed and Leah was able to demonstrate them prior to the end of the session.  Leah demonstrated good  understanding of the education provided. See Electronic Medical Record under Patient Instructions for exercises provided during therapy sessions    Assessment     Leah is a 58 y.o. female that presents to outpatient Physical Therapy 7 weeks post-op left total knee arthroplasty. Increased pain levels on non-operated knee affecting tolerance to closed chain interventions throughout session. Better tolerance to split lunges with visual and tactile cue from 4 inch step. Progressed single leg cybex resistance training per patient's tolerance. Progressed knee flexion from 100 degrees to 112 degrees following soft tissue mobilizations around incision and heel slides.    Leah Is progressing well towards her goals.   Patient prognosis is Good.   Patient will continue to " benefit from skilled outpatient physical therapy to address the deficits listed in the problem list box on initial evaluation, provide pt/family education and to maximize pt's level of independence in the home and community environment.     Patient's spiritual, cultural and educational needs considered and pt agreeable to plan of care and goals.  Anticipated barriers to physical therapy: Right knee osteoarthritis, range of motion limitations prior to surgery     Short Term Goals: 3 weeks   1.  Patient will demonstrate left knee ROM at least 0-5-90 degree. Met prior to 3/11/2024  2.  Patient will demonstrate left quad strength via MicroFET of > 11 kg to improve transfers, gait and ADL performance. Met 3/11/2024  3.  Patient will report ability to sit with knee in symmetrical and comfortable knee flexed position. Met 3/11/2024      Long Term Goals: 6 weeks   1.  Patient will demonstrate ability to perform > 9 sit <> stand transfers w/ UE use for improved ability to stand from low surfaces. Progressing, not met   2.  Patient will demonstrate ability to negotiate a flight of stairs w/ 1 HR and reciprocal pattern. Met 3/11/2024  3.  Patient will perform TUG in < 14 seconds for improved negra and safety with ambulation. Met, 2/14   4.  Patient will demonstrate to PT comprehensive understanding of each HEP component without verbal cueing. Progressing, not met   5.  Patient will improve FOTO to > 55% to improve ADL performance. Progressing, not met  6. Patient will be able to perform lunge pain-free to improve tolerance to squatting activities. Updated on 3/11/2024     Plan     Maintain extension within 5 degrees. Increase flexion to 115+  Progress closed chain activities and exercises as tolerated.   Discharge planning over the next 2 weeks.     Patt Wilder, PT, DPT

## 2024-03-20 ENCOUNTER — CLINICAL SUPPORT (OUTPATIENT)
Dept: REHABILITATION | Facility: HOSPITAL | Age: 59
End: 2024-03-20
Payer: COMMERCIAL

## 2024-03-20 DIAGNOSIS — Z74.09 DECREASED FUNCTIONAL MOBILITY AND ENDURANCE: ICD-10-CM

## 2024-03-20 DIAGNOSIS — M25.662 DECREASED RANGE OF MOTION (ROM) OF LEFT KNEE: Primary | ICD-10-CM

## 2024-03-20 PROCEDURE — 97112 NEUROMUSCULAR REEDUCATION: CPT | Mod: PN

## 2024-03-20 PROCEDURE — 97140 MANUAL THERAPY 1/> REGIONS: CPT | Mod: PN

## 2024-03-20 PROCEDURE — 97110 THERAPEUTIC EXERCISES: CPT | Mod: PN

## 2024-03-20 PROCEDURE — 97530 THERAPEUTIC ACTIVITIES: CPT | Mod: PN

## 2024-03-21 NOTE — PROGRESS NOTES
"[facility]   Physical Therapy Treatment Note      Name: Leah Minaya  Clinic Number: [id number]    Therapy Diagnosis:   Encounter Diagnoses   Name Primary?    Decreased range of motion (ROM) of left knee Yes    Decreased functional mobility and endurance      Physician: Mayur Rizzo MD    Visit Date: 3/20/2024    Evaluation Date: 2024  Authorization Period Expiration: 24  Plan of Care Expiration: 3/29/2024  Progress Note Due: 3/8/2024  Visit # / Visits authorized:    FOTO: NEXT  PTA Visit #: 0/5      Precautions: Standard; status post left TKA on 24 by Dr. Rizzo     Time In: 1105  Time Out: 1200  Total Appointment Time (timed & untimed codes): 60 minutes    Subjective     Patient reports: she still battles with stiffness at work    She was compliant with home exercise program.  Response to previous treatment: fatigue and decreased stiffness  Functional change: agrees to join a gym before next visit    Pain: 0/10    Objective       3/21/2024:  Knee active range of motion: 0-5-104 degrees right, 0-4-105 degrees left  Knee passive range of motion: 0-4-106 degrees right, 0-3-108 degrees left     Treatment     Leah received the treatments listed below:      therapeutic exercises to develop strength, endurance, ROM, and flexibility for 16 minutes including objective:    Walking loops for lower extremity endurance: 6 x 160 feet at beginning of session    Prone han lbs, 5' left   Heel slides: 5"x12 left     manual therapy techniques: were applied for 8 minutes, including:    Scar tissue mobilization  Grade III-IV patellar mobilizations    therapeutic activities to improve functional performance for 23 minutes, including:    Forward lunge: 2x10 bilateral with unilateral upper extremity support. Verbal cues for pain free depth  Sit<>stands: Standard teal chair, 3 kg x20  Reciprocal 6" stair navigation: 4 steps, 4 rounds with unilateral upper extremity support " "  Lateral step down: 4" step 2x10 bilateral     neuromuscular re-education activities to improve: Balance and Proprioception for 9 minutes. The following activities were included:    Lateral lunge: x10 bilateral without upper extremity support  Single leg balance: 3x10-15" left. Attempts < 10" not counted.    Patient Education and Home Exercises       Education provided:   - Continue home exercise program  - Join gym and start before the end of this week. Start machine exercises on lower weights and progress to tolerable challenge    Written Home Exercises Provided: 1/31/2024. Exercises were reviewed and Leah was able to demonstrate them prior to the end of the session.  Leah demonstrated good  understanding of the education provided. See Electronic Medical Record under Patient Instructions for exercises provided during therapy sessions    Assessment     Leah is a 58 y.o. female that presents to outpatient Physical Therapy 7 weeks post-op left total knee arthroplasty. Knee range of motion comparable bilaterally. Improved performance of activities with less grimacing throughout session.     Leah Is progressing well towards her goals.   Patient prognosis is Good.   Patient will continue to benefit from skilled outpatient physical therapy to address the deficits listed in the problem list box on initial evaluation, provide pt/family education and to maximize pt's level of independence in the home and community environment.     Patient's spiritual, cultural and educational needs considered and pt agreeable to plan of care and goals.  Anticipated barriers to physical therapy: Right knee osteoarthritis, range of motion limitations prior to surgery     Short Term Goals: 3 weeks   1.  Patient will demonstrate left knee ROM at least 0-5-90 degree. Met prior to 3/11/2024  2.  Patient will demonstrate left quad strength via MicroFET of > 11 kg to improve transfers, gait and ADL performance. Met 3/11/2024  3.  " Patient will report ability to sit with knee in symmetrical and comfortable knee flexed position. Met 3/11/2024      Long Term Goals: 6 weeks   1.  Patient will demonstrate ability to perform > 9 sit <> stand transfers w/ UE use for improved ability to stand from low surfaces. Progressing, not met   2.  Patient will demonstrate ability to negotiate a flight of stairs w/ 1 HR and reciprocal pattern. Met 3/11/2024  3.  Patient will perform TUG in < 14 seconds for improved negra and safety with ambulation. Met, 2/14   4.  Patient will demonstrate to PT comprehensive understanding of each HEP component without verbal cueing. Progressing, not met   5.  Patient will improve FOTO to > 55% to improve ADL performance. Progressing, not met  6. Patient will be able to perform lunge pain-free to improve tolerance to squatting activities. Updated on 3/11/2024     Plan     Potential discharge next week    Ivy Fernandez, PT, DPT, OCS

## 2024-03-28 ENCOUNTER — CLINICAL SUPPORT (OUTPATIENT)
Dept: REHABILITATION | Facility: HOSPITAL | Age: 59
End: 2024-03-28
Payer: COMMERCIAL

## 2024-03-28 DIAGNOSIS — M25.662 DECREASED RANGE OF MOTION (ROM) OF LEFT KNEE: Primary | ICD-10-CM

## 2024-03-28 DIAGNOSIS — Z74.09 DECREASED FUNCTIONAL MOBILITY AND ENDURANCE: ICD-10-CM

## 2024-03-28 PROCEDURE — 97110 THERAPEUTIC EXERCISES: CPT | Mod: PN

## 2024-03-28 PROCEDURE — 97530 THERAPEUTIC ACTIVITIES: CPT | Mod: PN

## 2024-03-28 NOTE — PATIENT INSTRUCTIONS
Heel Slides - Lie down instead of sitting as shown    With towel around left heel, gently pull knee up with towel until stretch is felt. Hold 5-10 seconds.  Repeat 10 times left leg per set. Do 4 sessions per day.      Heel Prop    Sit upright with the heel of the involved leg propped on a towel roll, a pillow, or another chair. Allow gravity to stretch your knee towards a more straightened position.   *Can also assist by placing your hand just above the knee and gently pressing down toward the floor.   Hold 3-5 minutes. Perform 1 session a day

## 2024-03-28 NOTE — PROGRESS NOTES
"[facility]   Physical Therapy Treatment/Discharge Note      Name: Leah Minaya  Clinic Number: [id number]    Therapy Diagnosis:   Encounter Diagnoses   Name Primary?    Decreased range of motion (ROM) of left knee Yes    Decreased functional mobility and endurance      Physician: Mayur Rizzo MD    Visit Date: 3/28/2024    Evaluation Date: 1/31/2024  Authorization Period Expiration: 12/31/24  Plan of Care Expiration: 3/29/2024  Progress Note Due: 3/8/2024  Visit # / Visits authorized: 20/20 + 1   FOTO: Done  PTA Visit #: 0/5      Precautions: Standard; status post left TKA on 1/29/24 by Dr. Rizzo     Time In: 1002  Time Out: 1036  Total Appointment Time (timed & untimed codes): 34 minutes    Subjective     Patient reports: she was hospitalized this week for high blood pressure. Patient states her strength measurements might be affected by this. Patient plans to join a gym but has not yet done so. Patient agreeable to discharge from therapy today.    She was compliant with home exercise program.  Response to previous treatment: similar fatigue and stiffness  Functional change: thinks she is ready to continue on her own vs continued physical therapy    Pain: 0/10    Objective       3/28/2024:    Knee active range of motion: 0-4-100 degrees left   Knee passive range of motion: 0-3-101 degrees left     Lower extremity strength with MicroFET handheld dynamometer Right Left Pain/dysfunction with movement   (approx 4 sec hold w/ max contraction)   Hip flexion 22.6 kg  18.6 kg     Hip abduction 18.6 kg  17.7 kg     Quadriceps 19.2 kg  16.1 kg     Hamstrings 13.5 kg  9.1 kg       Timed Up and Go:  9.68 seconds (without assistive device)  30" Chair Stand: 10 without upper extremity support    FOTO: 70%    Treatment     Leah received the treatments listed below:      therapeutic exercises to develop strength, endurance, ROM, and flexibility for 23 minutes including objective:    Objective  Heel " "slides: 5"x20 left with straps and slide board    therapeutic activities to improve functional performance for 11 minutes, including:    Home exercise program review    Patient Education and Home Exercises       Education provided:   - Continue home exercise program  - Join gym and start before the end of this week. Start machine exercises on lower weights and progress to tolerable challenge    Written Home Exercises Provided: 1/31/2024. Exercises were reviewed and Leah was able to demonstrate them prior to the end of the session.  Leah demonstrated good  understanding of the education provided. See Electronic Medical Record under Patient Instructions for exercises provided during therapy sessions    Assessment     Leah is a 58 y.o. female that presents to outpatient Physical Therapy 8 weeks post-op left total knee arthroplasty. Her strength meets short term goals, and her range of motion remains comparable to the right lower extremity (see last visit for right measurement). Patient does not have any functional barriers, and pain is well controlled. Patient is appropriate to transition to home exercise program rather than continued supervised therapy at this time.    Leah Is progressing well towards her goals.   Patient prognosis is Good.   Patient will continue to benefit from skilled outpatient physical therapy to address the deficits listed in the problem list box on initial evaluation, provide pt/family education and to maximize pt's level of independence in the home and community environment.     Patient's spiritual, cultural and educational needs considered and pt agreeable to plan of care and goals.  Anticipated barriers to physical therapy: Right knee osteoarthritis, range of motion limitations prior to surgery     Short Term Goals: 3 weeks   1.  Patient will demonstrate left knee ROM at least 0-5-90 degree. Met prior to 3/11/2024  2.  Patient will demonstrate left quad strength via MicroFET of > " 11 kg to improve transfers, gait and ADL performance. Met 3/11/2024  3.  Patient will report ability to sit with knee in symmetrical and comfortable knee flexed position. Met 3/11/2024      Long Term Goals: 6 weeks   1.  Patient will demonstrate ability to perform > 9 sit <> stand transfers w/ UE use for improved ability to stand from low surfaces. Met 3/28/2024  2.  Patient will demonstrate ability to negotiate a flight of stairs w/ 1 HR and reciprocal pattern. Met 3/11/2024  3.  Patient will perform TUG in < 14 seconds for improved negra and safety with ambulation. Met, 2/14   4.  Patient will demonstrate to PT comprehensive understanding of each HEP component without verbal cueing. Met 3/28/2024  5.  Patient will improve FOTO to > 55% to improve ADL performance. Met 3/28/2024  6. Patient will be able to perform lunge pain-free to improve tolerance to squatting activities. Partial depth met 3/28/2024    Plan     Discharge from physical therapy.    Ivy Fernandez, PT, DPT, OCS

## 2024-05-09 ENCOUNTER — TELEPHONE (OUTPATIENT)
Dept: RESEARCH | Facility: HOSPITAL | Age: 59
End: 2024-05-09
Payer: COMMERCIAL

## 2024-05-09 NOTE — TELEPHONE ENCOUNTER
Raj Study  Study ID#[id number]    Called patient concerning email she sent about having issues with clincard. Left voicemail instructing patient to call the clincard customer service to resolve issues.

## 2024-05-30 DIAGNOSIS — M17.12 PRIMARY OSTEOARTHRITIS OF LEFT KNEE: ICD-10-CM

## 2024-05-30 DIAGNOSIS — M25.562 LEFT KNEE PAIN, UNSPECIFIED CHRONICITY: Primary | ICD-10-CM

## 2024-06-04 ENCOUNTER — RESEARCH ENCOUNTER (OUTPATIENT)
Dept: RESEARCH | Facility: HOSPITAL | Age: 59
End: 2024-06-04
Payer: COMMERCIAL

## 2024-06-04 ENCOUNTER — HOSPITAL ENCOUNTER (OUTPATIENT)
Dept: RADIOLOGY | Facility: HOSPITAL | Age: 59
Discharge: HOME | End: 2024-06-04
Attending: ORTHOPAEDIC SURGERY
Payer: COMMERCIAL

## 2024-06-04 ENCOUNTER — OFFICE VISIT (OUTPATIENT)
Dept: ORTHOPEDICS | Facility: CLINIC | Age: 59
End: 2024-06-04
Payer: COMMERCIAL

## 2024-06-04 VITALS — BODY MASS INDEX: 33.91 KG/M2 | HEIGHT: 64 IN | WEIGHT: 198.63 LBS

## 2024-06-04 DIAGNOSIS — M17.12 PRIMARY OSTEOARTHRITIS OF LEFT KNEE: ICD-10-CM

## 2024-06-04 DIAGNOSIS — Z96.652 AFTERCARE FOLLOWING LEFT KNEE JOINT REPLACEMENT SURGERY: Primary | ICD-10-CM

## 2024-06-04 DIAGNOSIS — M25.562 LEFT KNEE PAIN, UNSPECIFIED CHRONICITY: ICD-10-CM

## 2024-06-04 DIAGNOSIS — Z47.1 AFTERCARE FOLLOWING LEFT KNEE JOINT REPLACEMENT SURGERY: Primary | ICD-10-CM

## 2024-06-04 PROCEDURE — G2211 COMPLEX E/M VISIT ADD ON: HCPCS | Mod: S$GLB,,, | Performed by: ORTHOPAEDIC SURGERY

## 2024-06-04 PROCEDURE — 77073 BONE LENGTH STUDIES: CPT | Mod: 26,,, | Performed by: INTERNAL MEDICINE

## 2024-06-04 PROCEDURE — 73562 X-RAY EXAM OF KNEE 3: CPT | Mod: TC,PN,LT

## 2024-06-04 PROCEDURE — 99999 PR PBB SHADOW E&M-EST. PATIENT-LVL IV: CPT | Mod: PBBFAC,,, | Performed by: ORTHOPAEDIC SURGERY

## 2024-06-04 PROCEDURE — 99214 OFFICE O/P EST MOD 30 MIN: CPT | Mod: S$GLB,,, | Performed by: ORTHOPAEDIC SURGERY

## 2024-06-04 PROCEDURE — 73562 X-RAY EXAM OF KNEE 3: CPT | Mod: 26,LT,, | Performed by: INTERNAL MEDICINE

## 2024-06-04 PROCEDURE — 77073 BONE LENGTH STUDIES: CPT | Mod: TC,PN

## 2024-06-04 NOTE — PROGRESS NOTES
Subjective:      Patient ID: Leah Minaya is a 59 y.o. female.    Chief Complaint: Pain of the Left Knee    Patient is 4 months s/p  left primary total knee replacement  Anterior knee pain: No  Has improved pain  Is in physical therapy  No  Problems w incision  No  Is  happy with result  Yes  Opiod free: Yes         Social History     Occupational History    Not on file   Tobacco Use    Smoking status: Never    Smokeless tobacco: Never   Substance and Sexual Activity    Alcohol use: No    Drug use: No    Sexual activity: Yes      Social Determinants of Health     Tobacco Use: Low Risk  (6/4/2024)    Patient History     Smoking Tobacco Use: Never     Smokeless Tobacco Use: Never     Passive Exposure: Not on file   Alcohol Use: Not on file   Financial Resource Strain: Not on file   Food Insecurity: Not on file   Transportation Needs: Not on file   Physical Activity: Not on file   Stress: Not on file   Housing Stability: Not on file   Depression: Not on file   Utilities: Not on file   Health Literacy: Not on file   Social Isolation: Not on file      Review of Systems   Constitutional: Negative for diaphoresis.   HENT:  Negative for ear discharge, nosebleeds and stridor.    Eyes:  Negative for photophobia.   Cardiovascular:  Negative for syncope.   Respiratory:  Negative for hemoptysis, shortness of breath and wheezing.    Neurological:  Negative for tremors.   Psychiatric/Behavioral: Negative.           Objective:    General    Constitutional: She is oriented to person, place, and time. She appears well-developed and well-nourished.   HENT:   Head: Normocephalic and atraumatic.   Eyes: EOM are normal.   Pulmonary/Chest: Effort normal.   Neurological: She is alert and oriented to person, place, and time.   Psychiatric: She has a normal mood and affect. Her behavior is normal. Judgment and thought content normal.     General Musculoskeletal Exam   Gait: normal         Left Knee Exam     Inspection   Erythema:  absent  Scars: present  Swelling: absent  Effusion: absent  Deformity: absent  Bruising: absent    Tenderness   The patient is experiencing no tenderness.     Range of Motion   Extension:  0   Flexion:  0 (95)     Tests   Stability   PCL-Posterior Drawer: normal (0 to 2mm)  MCL - Valgus: normal (0 to 2mm)  LCL - Varus: normal (0 to 2mm)  Patella   Passive Patellar Tilt: neutral  Patellar Tracking: normal    Other   Sensation: normal    Comments:  Incision well healed         Assessment:       1. Aftercare following left knee joint replacement surgery          Plan:   Overall patient appears to be doing well and is happy with the result of the knee arthroplasty. They can continue activities as tolerated avoiding high impact activities.  I would like to see the patient back In 2 months with xrays.

## 2024-06-04 NOTE — PROGRESS NOTES
Protocol: Innovations in Genicular Outcomes Registry (Raj)  Protocol#:Raj  IRB# 2021.156  Version Date: 10-Clement-2023  PI: Mayur Rizzo MD  Patient Initials: ALON (Study ID# [id number])      Clinic Visit     Patient was met in clinic by research personnel and orthopedics provider. Patient previously had TKA to target knee (left). No injections or other interventions were given at this appointment. Patient's pain score was a 0. She is doing well and is happy with OA treatment results. She has no issues with study but was reminded about email notifications for when questionnaires are available to complete.       She will follow up with Lina in 2 months with xrays.    Patient instructed to continue study questionnaires and to call research personnel if they have any questions. Currently in the monthly phase of study.

## 2024-08-02 DIAGNOSIS — Z96.652 AFTERCARE FOLLOWING LEFT KNEE JOINT REPLACEMENT SURGERY: Primary | ICD-10-CM

## 2024-08-02 DIAGNOSIS — Z47.1 AFTERCARE FOLLOWING LEFT KNEE JOINT REPLACEMENT SURGERY: Primary | ICD-10-CM

## 2024-08-14 ENCOUNTER — RESEARCH ENCOUNTER (OUTPATIENT)
Dept: RESEARCH | Facility: HOSPITAL | Age: 59
End: 2024-08-14
Payer: COMMERCIAL

## 2024-08-14 ENCOUNTER — OFFICE VISIT (OUTPATIENT)
Dept: ORTHOPEDICS | Facility: CLINIC | Age: 59
End: 2024-08-14
Payer: COMMERCIAL

## 2024-08-14 ENCOUNTER — HOSPITAL ENCOUNTER (OUTPATIENT)
Dept: RADIOLOGY | Facility: HOSPITAL | Age: 59
Discharge: HOME | End: 2024-08-14
Attending: PHYSICIAN ASSISTANT
Payer: COMMERCIAL

## 2024-08-14 VITALS — BODY MASS INDEX: 33.84 KG/M2 | WEIGHT: 198.19 LBS | HEIGHT: 64 IN

## 2024-08-14 DIAGNOSIS — Z96.652 AFTERCARE FOLLOWING LEFT KNEE JOINT REPLACEMENT SURGERY: Primary | ICD-10-CM

## 2024-08-14 DIAGNOSIS — Z47.1 AFTERCARE FOLLOWING LEFT KNEE JOINT REPLACEMENT SURGERY: ICD-10-CM

## 2024-08-14 DIAGNOSIS — Z47.1 AFTERCARE FOLLOWING LEFT KNEE JOINT REPLACEMENT SURGERY: Primary | ICD-10-CM

## 2024-08-14 DIAGNOSIS — Z96.652 AFTERCARE FOLLOWING LEFT KNEE JOINT REPLACEMENT SURGERY: ICD-10-CM

## 2024-08-14 DIAGNOSIS — M62.81 QUADRICEPS WEAKNESS: ICD-10-CM

## 2024-08-14 DIAGNOSIS — M25.562 LEFT KNEE PAIN, UNSPECIFIED CHRONICITY: Primary | ICD-10-CM

## 2024-08-14 PROCEDURE — 77073 BONE LENGTH STUDIES: CPT | Mod: 26,,, | Performed by: RADIOLOGY

## 2024-08-14 PROCEDURE — 99999 PR PBB SHADOW E&M-EST. PATIENT-LVL IV: CPT | Mod: PBBFAC,,, | Performed by: PHYSICIAN ASSISTANT

## 2024-08-14 PROCEDURE — 99213 OFFICE O/P EST LOW 20 MIN: CPT | Mod: S$GLB,,, | Performed by: PHYSICIAN ASSISTANT

## 2024-08-14 PROCEDURE — 73562 X-RAY EXAM OF KNEE 3: CPT | Mod: TC,PN,LT

## 2024-08-14 PROCEDURE — 77073 BONE LENGTH STUDIES: CPT | Mod: TC,PN

## 2024-08-14 PROCEDURE — 73562 X-RAY EXAM OF KNEE 3: CPT | Mod: 26,LT,, | Performed by: RADIOLOGY

## 2024-08-14 NOTE — PROGRESS NOTES
"Subjective:      Chief Complaint: Pain of the Left Knee (L TKA 1/29/24)    Patient ID: Leah Minaya is a 59 y.o. female.  Patient is 7 months s/p  left primary total knee replacement 1/29/24  Anterior knee pain: No  Has improved pain  Is in physical therapy  No  Problems w incision  No  Is  happy with result  Yes  Opiod free: Yes   Having some paresthesias around the proximal portion of the scar. Noting keloid formation.   Also notes ROM is lacking from when she was in PT. Does report to doing her home exercises "sometimes."         Past Medical History:   Diagnosis Date    Benign essential HTN     Bilateral carpal tunnel syndrome 5/8/2017    History of prediabetes     Mixed hyperlipidemia 5/8/2017    Primary osteoarthritis of knees, bilateral     Vitamin D deficiency         Past Surgical History:   Procedure Laterality Date    none      TOTAL KNEE ARTHROPLASTY Left 1/29/2024    Procedure: ARTHROPLASTY, KNEE, TOTAL monoblock;  Surgeon: Mayur Rizzo MD;  Location: [facility];  Service: Orthopedics;  Laterality: Left;  cellerate and prineo for bmi>40, smokers, diabetics, and if on blood thinners, per Elida and Dr. Rizzo, this is a LEFT knee        Current Outpatient Medications   Medication Instructions    albuterol 90 mcg/actuation inhaler 2 puffs, Inhalation, Every 4 hours PRN    amLODIPine (NORVASC) 5 mg, Oral, Daily    aspirin (ECOTRIN) 81 mg, Oral, 2 times daily    cetirizine (ZYRTEC) 10 mg, Oral, Daily    clindamycin phosphate 1% (CLINDAGEL) 1 % gel Topical (Top), 2 times daily    famotidine (PEPCID) 20 mg, Oral, 2 times daily    fluticasone propionate (FLONASE) 50 mcg, Each Nostril, Daily    gabapentin (NEURONTIN) 600 mg, Oral, 2 times daily    hydroCHLOROthiazide (HYDRODIURIL) 25 MG tablet TAKE 1 TABLET BY MOUTH ONCE DAILY    hydroquinone 2 % Crea Apply topically as needed.    ibuprofen (ADVIL,MOTRIN) 200 mg, Oral    metronidazole 1% (METROGEL) 1 % Gel Topical (Top), Daily    mirabegron (MYRBETRIQ) 25 mg Tb24 " "ER tablet Oral    mometasone 0.1% (ELOCON) 0.1 % cream Topical (Top), Daily    MYRBETRIQ 50 mg Tb24 1 tablet, Oral, Daily    triamcinolone acetonide 0.1% (KENALOG) 0.1 % cream Topical (Top), 2 times daily        Review of patient's allergies indicates:  No Known Allergies    Review of Systems   Constitutional: Negative for fever and malaise/fatigue.   Eyes:  Negative for blurred vision.   Cardiovascular:  Negative for chest pain.   Respiratory:  Negative for shortness of breath.    Skin:  Negative for poor wound healing.   Musculoskeletal:  Positive for joint pain and myalgias.   Genitourinary:  Negative for bladder incontinence.   Neurological:  Negative for dizziness, numbness and paresthesias.   Psychiatric/Behavioral:  Negative for altered mental status.        The patient's relevant past medical, surgical, and social history was reviewed in Epic.       Objective:      VITAL SIGNS: Ht 5' 4" (1.626 m)   Wt 89.9 kg (198 lb 3.1 oz)   LMP 10/07/2018   BMI 34.02 kg/m²     General    Nursing note and vitals reviewed.  Constitutional: She is oriented to person, place, and time. She appears well-developed and well-nourished.   Neurological: She is alert and oriented to person, place, and time.     General Musculoskeletal Exam   Gait: normal         Left Knee Exam     Inspection   Scars: present    Range of Motion   Extension:  5   Flexion:  110     Tests   Stability   Lachman: normal (-1 to 2mm)   MCL - Valgus: normal (0 to 2mm)  LCL - Varus: normal (0 to 2mm)    Other   Sensation: normal    Comments:  Keloid scar formation     Muscle Strength   Left Lower Extremity   Quadriceps:  4/5   Hamstrin/5        Imaging  X-Ray Knee 3 View Left  Narrative: EXAMINATION:  XR KNEE 3 VIEW LEFT    CLINICAL HISTORY:  Aftercare following joint replacement surgery    COMPARISON:  2024.    FINDINGS:  The alignment is within normal limits.  No displaced fractures identified.  No evidence of lytic or blastic lesions.Joint " spaces are unremarkable.Soft tissues are unremarkable.  Impression: No evidence of fracture.No significant degenerative changes.    Electronically signed by: Reyes Obrien MD  Date:    08/14/2024  Time:    08:49  Hardware well placed. No evidence of loosening.       I have reviewed the above radiograph and agree with the findings stated by the radiologist.           Assessment:       Leah Minaya is a 59 y.o. female seen in the office today for   1. Aftercare following left knee joint replacement surgery    2. Quadriceps weakness     Patient is noting decreased range of motion since she was in therapy as well as weakness in her left knee.  I recommend she start back at her home exercise program.  If she does not notice relief with this, we can refer her back to physical therapy.  As far as the keloid scar formation, she would have the follow-up with Dermatology.      Plan:       Leah was seen today for pain.    Diagnoses and all orders for this visit:    Aftercare following left knee joint replacement surgery    Quadriceps weakness      Home exercise program. Will refer to PT in the future if needed.  Dermatology for keloid formation  Follow up in January 2025 for one year  PO visit with xrays.     Diagnoses and plan discussed with the patient, as well as the expected course and duration of his symptoms.  All questions and concerns were addressed prior to the end of the visit.   Instructed patient to call office if they have any future questions/concerns or to schedule apt. Patient will return to see me if symptoms worsen or fail to improve    Note dictated with voice recognition software, please excuse any grammatical errors.        Lina Linn PA-C      Department of Orthopedic Surgery  [facility]  Office: [phone]  08/14/2024

## 2024-08-14 NOTE — PROGRESS NOTES
Protocol: Innovations in Genicular Outcomes Registry (Raj)  Protocol#:Raj  IRB# 2021.156  Version Date: 10-Clement-2023  PI: Mayur Rizzo MD  Patient Initials: [email or web address] (Study ID# [id number])      Clinic Visit     Patient was met in clinic by research personnel and orthopedics provider. Patient previously had TKA to target knee (left). No injections or other interventions were given at this appointment. Patient's pain score was a 0. Patient is in for LEFT TKA follow up. She is doing well but states having pain on the incision line with keloid  formation. She also has some muscle weakness which contributes to pain. Patient was recommended by ortho provider to increase home exercise program. She will be l referred to PT in the future if needed. She will talk to dermatologist for keloid formation and follow up with ortho clinic in January 2025 for her 1 year follow up (with xrays).     Patient instructed to continue study questionnaires and to call research personnel if they have any questions. Currently in the quarter monthly phase of study.

## 2024-11-25 ENCOUNTER — TELEPHONE (OUTPATIENT)
Dept: RESEARCH | Facility: HOSPITAL | Age: 59
End: 2024-11-25
Payer: COMMERCIAL

## 2024-11-25 NOTE — TELEPHONE ENCOUNTER
Raj Study  Study ID#[id number]    Patient was called and reminded to complete Month 9 questionnaires. Password reset link was sent to patient.

## 2024-11-26 ENCOUNTER — TELEPHONE (OUTPATIENT)
Dept: RESEARCH | Facility: HOSPITAL | Age: 59
End: 2024-11-26
Payer: COMMERCIAL

## 2025-01-29 ENCOUNTER — OFFICE VISIT (OUTPATIENT)
Dept: ORTHOPEDICS | Facility: CLINIC | Age: 60
End: 2025-01-29
Payer: COMMERCIAL

## 2025-01-29 ENCOUNTER — HOSPITAL ENCOUNTER (OUTPATIENT)
Dept: RADIOLOGY | Facility: HOSPITAL | Age: 60
Discharge: HOME | End: 2025-01-29
Attending: PHYSICIAN ASSISTANT
Payer: COMMERCIAL

## 2025-01-29 ENCOUNTER — RESEARCH ENCOUNTER (OUTPATIENT)
Dept: RESEARCH | Facility: HOSPITAL | Age: 60
End: 2025-01-29
Payer: COMMERCIAL

## 2025-01-29 VITALS — BODY MASS INDEX: 35.16 KG/M2 | HEIGHT: 64 IN | WEIGHT: 205.94 LBS

## 2025-01-29 DIAGNOSIS — Z47.1 AFTERCARE FOLLOWING LEFT KNEE JOINT REPLACEMENT SURGERY: ICD-10-CM

## 2025-01-29 DIAGNOSIS — M25.562 LEFT KNEE PAIN, UNSPECIFIED CHRONICITY: ICD-10-CM

## 2025-01-29 DIAGNOSIS — Z96.652 AFTERCARE FOLLOWING LEFT KNEE JOINT REPLACEMENT SURGERY: ICD-10-CM

## 2025-01-29 DIAGNOSIS — Z96.652 AFTERCARE FOLLOWING LEFT KNEE JOINT REPLACEMENT SURGERY: Primary | ICD-10-CM

## 2025-01-29 DIAGNOSIS — Z47.1 AFTERCARE FOLLOWING LEFT KNEE JOINT REPLACEMENT SURGERY: Primary | ICD-10-CM

## 2025-01-29 PROCEDURE — 73562 X-RAY EXAM OF KNEE 3: CPT | Mod: 26,LT,, | Performed by: INTERNAL MEDICINE

## 2025-01-29 PROCEDURE — 77073 BONE LENGTH STUDIES: CPT | Mod: 26,,, | Performed by: RADIOLOGY

## 2025-01-29 PROCEDURE — 99213 OFFICE O/P EST LOW 20 MIN: CPT | Mod: S$GLB,,, | Performed by: PHYSICIAN ASSISTANT

## 2025-01-29 PROCEDURE — 99999 PR PBB SHADOW E&M-EST. PATIENT-LVL III: CPT | Mod: PBBFAC,,, | Performed by: PHYSICIAN ASSISTANT

## 2025-01-29 PROCEDURE — 73562 X-RAY EXAM OF KNEE 3: CPT | Mod: TC,PN,LT

## 2025-01-29 PROCEDURE — 77073 BONE LENGTH STUDIES: CPT | Mod: TC,PN

## 2025-01-29 NOTE — PROGRESS NOTES
Subjective:      Chief Complaint: Pain of the Left Knee (S/P L TKA 1/29/24) and Follow-up    Patient ID: Leah Minaya is a 59 y.o. female.  Patient is 1 years s/p  left primary total knee replacement  Anterior knee pain: No  Has improved pain  Is in physical therapy  No  Problems w incision  No  Is  happy with result  Yes  Opiod free: Yes    Admits to not keeping up with HEP. Notes lack of motion now compared to when she was in PT.            Past Medical History:   Diagnosis Date    Benign essential HTN     Bilateral carpal tunnel syndrome 5/8/2017    History of prediabetes     Mixed hyperlipidemia 5/8/2017    Primary osteoarthritis of knees, bilateral     Vitamin D deficiency         Past Surgical History:   Procedure Laterality Date    none      TOTAL KNEE ARTHROPLASTY Left 1/29/2024    Procedure: ARTHROPLASTY, KNEE, TOTAL monoblock;  Surgeon: Mayur Rizzo MD;  Location: [facility];  Service: Orthopedics;  Laterality: Left;  celldawson and prinannetteo for bmi>40, smokers, diabetics, and if on blood thinners, per Elida and Dr. Rizzo, this is a LEFT knee        Current Outpatient Medications   Medication Instructions    albuterol 90 mcg/actuation inhaler 2 puffs, Inhalation, Every 4 hours PRN    amLODIPine (NORVASC) 5 mg, Oral, Daily    aspirin (ECOTRIN) 81 mg, Oral, 2 times daily    cetirizine (ZYRTEC) 10 mg, Oral, Daily    clindamycin phosphate 1% (CLINDAGEL) 1 % gel Topical (Top), 2 times daily    famotidine (PEPCID) 20 mg, Oral, 2 times daily    fluticasone propionate (FLONASE) 50 mcg, Each Nostril, Daily    gabapentin (NEURONTIN) 600 mg, Oral, 2 times daily    hydroCHLOROthiazide (HYDRODIURIL) 25 MG tablet TAKE 1 TABLET BY MOUTH ONCE DAILY    hydroquinone 2 % Crea Apply topically as needed.    ibuprofen (ADVIL,MOTRIN) 200 mg    metronidazole 1% (METROGEL) 1 % Gel Daily    mirabegron (MYRBETRIQ) 25 mg Tb24 ER tablet Take by mouth.    mometasone 0.1% (ELOCON) 0.1 % cream Topical (Top), Daily    MYRBETRIQ 50 mg Tb24 1  "tablet, Daily    triamcinolone acetonide 0.1% (KENALOG) 0.1 % cream Topical (Top), 2 times daily        Review of patient's allergies indicates:  No Known Allergies    ROS    The patient's relevant past medical, surgical, and social history was reviewed in Epic.       Objective:      VITAL SIGNS: Ht 5' 4" (1.626 m)   Wt 93.4 kg (205 lb 14.6 oz)   LMP 10/07/2018   BMI 35.34 kg/m²     General    Nursing note and vitals reviewed.  Constitutional: She is oriented to person, place, and time. She appears well-developed and well-nourished.   Neurological: She is alert and oriented to person, place, and time.     General Musculoskeletal Exam   Gait: normal         Left Knee Exam     Inspection   Scars: present    Tenderness   The patient is experiencing no tenderness.     Range of Motion   Extension:  5   Flexion:  100     Tests   Stability   Lachman: normal (-1 to 2mm)   MCL - Valgus: normal (0 to 2mm)  LCL - Varus: normal (0 to 2mm)  Patella   Passive Patellar Tilt: neutral    Other   Sensation: normal    Comments:  Keloid scar formation     Muscle Strength   Left Lower Extremity   Quadriceps:  5/5   Hamstrin/5     Vascular Exam       Left Pulses  Dorsalis Pedis:      2+  Posterior Tibial:      1+           Imaging  X-Ray Hip to Ankle  Narrative: EXAMINATION:  XR HIP TO ANKLE    CLINICAL HISTORY:  Aftercare following joint replacement surgery    TECHNIQUE:  Hip to ankle radiograph series dated 2024    COMPARISON:  2024    FINDINGS:  Prior left knee arthroplasty which projects in appropriate alignment.  Significant right knee femorotibial DJD, similar.    Lower extremity length was obtained from the superior acetabulum to the mid-talar dome using electronic calipers measuring 104.4 cm on the right and 108.1 cm on the left.  Impression: As above.    Electronically signed by: Douglas Tadeo  Date:    2025  Time:    10:31  X-Ray Knee 3 View Left  Narrative: EXAMINATION:  XR KNEE 3 VIEW " LEFT    CLINICAL HISTORY:  Pain in left knee    TECHNIQUE:  AP, lateral, and Merchant views of the left knee were performed.    COMPARISON:  August 14, 2024    FINDINGS:  There are surgical changes of total knee arthroplasty.  The hardware is in satisfactory position and is unchanged when compared to previous imaging.  The surrounding osseous structures are intact.  Impression: As above    Electronically signed by: Ml Mckenzie MD  Date:    01/29/2025  Time:    09:07    I have reviewed the above radiograph and agree with the findings stated by the radiologist.           Assessment:       Leah Minaya is a 59 y.o. female seen in the office today for   1. Aftercare following left knee joint replacement surgery     Overall patient appears to be doing well and is happy with the result of the knee arthroplasty. They can continue activities as tolerated avoiding high impact activities.  I would like to see the patient back In 1 years with xrays.   Continue working on home exercises.      Plan:       Leah was seen today for pain and follow-up.    Diagnoses and all orders for this visit:    Aftercare following left knee joint replacement surgery          Diagnoses and plan discussed with the patient, as well as the expected course and duration of his symptoms.  All questions and concerns were addressed prior to the end of the visit.   Instructed patient to call office if they have any future questions/concerns or to schedule apt. Patient will return to see me if symptoms worsen or fail to improve    Note dictated with voice recognition software, please excuse any grammatical errors.        Lina Linn PA-C      Department of Orthopedic Surgery  [facility]  Office: [phone]  01/29/2025

## 2025-01-29 NOTE — PROGRESS NOTES
Protocol: Innovations in Genicular Outcomes Registry (Raj)  Protocol#:Raj  IRB# 2021.156  Version Date: 10-Clement-2023  PI: Mayur Rizzo MD  Patient Initials: ALON (Study ID# [id number])      Clinic Visit     Patient was met in clinic by research personnel and orthopedics provider. Patient previously had TKA to target knee (left). No injections or other interventions were given at this appointment. Patient's pain score was a 0. She has not taken any medications for knee pain currently, but did take an Advil for back pain. Today's xrays are good and patient is happy with the surgical treatment results. Patient discussed with ortho provider that she hasn't been keeping up with home exercise program. Notes lack of motion now compared to when she was in PT. Patient was recommended to do home exercise program to increase range of motion and quad strength.     She has no issues with study or with her clincard.      Patient instructed to continue study questionnaires and to call research personnel if they have any questions. Currently in quarter monthly phase of study.

## 2025-02-25 ENCOUNTER — TELEPHONE (OUTPATIENT)
Dept: RESEARCH | Facility: HOSPITAL | Age: 60
End: 2025-02-25
Payer: COMMERCIAL

## 2025-02-25 NOTE — TELEPHONE ENCOUNTER
Raj Study  Study ID#[id number]    Left voicemail reminding patient to complete her Month 12 questionnaires which are due on 2/27/25.     Also sent email reminder.

## 2025-05-28 ENCOUNTER — TELEPHONE (OUTPATIENT)
Dept: RESEARCH | Facility: HOSPITAL | Age: 60
End: 2025-05-28
Payer: COMMERCIAL

## 2025-05-28 NOTE — TELEPHONE ENCOUNTER
Raj Study  ID#[id number]    Left voicemail reminding patient to complete Month 15 questionnaires which are due tomorrow. Also sent another email reminder.

## 2025-08-28 ENCOUNTER — TELEPHONE (OUTPATIENT)
Dept: RESEARCH | Facility: HOSPITAL | Age: 60
End: 2025-08-28
Payer: COMMERCIAL

## 2025-09-02 ENCOUNTER — RESEARCH ENCOUNTER (OUTPATIENT)
Dept: RESEARCH | Facility: HOSPITAL | Age: 60
End: 2025-09-02
Payer: COMMERCIAL

## (undated) DEVICE — SOL IRR NACL .9% 3000ML

## (undated) DEVICE — CONTAINER SPECIMEN STRL 3OZ

## (undated) DEVICE — GLOVE BIOGEL ORTHOPEDIC 7.5

## (undated) DEVICE — GLOVE BIOGEL PI MICRO INDIC 8

## (undated) DEVICE — PAD PREP CUFFED NS 24X48IN

## (undated) DEVICE — BATTERY INSTRUMENT

## (undated) DEVICE — DURAPREP SURG SCRUB 26ML

## (undated) DEVICE — BLADE 90X13X1.27MM

## (undated) DEVICE — DRAPE CASSETTE 20 X 40

## (undated) DEVICE — KIT MX BNE CEM REVOLTN W/BRKWY

## (undated) DEVICE — ELECTRODE BLD 1 INCH TEFLON

## (undated) DEVICE — DRAPE TIBURON ORTHOPEDIC SPLIT

## (undated) DEVICE — NDL FILTER MICRON 18G 1 1/2

## (undated) DEVICE — GAUZE SPONGE 4X4 12PLY

## (undated) DEVICE — BLADE RMR PATELLA 35MM

## (undated) DEVICE — CARD UNIV KNEE NAVGTN SW-SCL L

## (undated) DEVICE — NDL 22GA X1 1/2 REG BEVEL

## (undated) DEVICE — DRAPE INCISE IOBAN 2 23X23IN

## (undated) DEVICE — SCRUB 10% POVIDONE IODINE 4OZ

## (undated) DEVICE — DECANTER FLUID TRNSF WHITE 9IN

## (undated) DEVICE — BANDAGE ACE ELASTIC 6"

## (undated) DEVICE — SYR ONLY LUER LOCK 20CC

## (undated) DEVICE — SUT VICRYL 1 OB 36 CTX

## (undated) DEVICE — MANIFOLD 4 PORT

## (undated) DEVICE — STRIP MEDI WND CLSR 1/2X4IN

## (undated) DEVICE — Device

## (undated) DEVICE — TOURNIQUET SB QC DP 34X4IN

## (undated) DEVICE — SYR 10CC LUER LOCK

## (undated) DEVICE — YANKAUER OPEN TIP W/O VENT

## (undated) DEVICE — SUT CTD VICRYL 2.0

## (undated) DEVICE — DRESSING OPTIFOAM AG 4X12IN

## (undated) DEVICE — SPONGE LAP 18X18 PREWASHED

## (undated) DEVICE — ALCOHOL 70% ISOP W/GREEN 16OZ

## (undated) DEVICE — SUT STRATAFIX PDS 1 CTX 18IN

## (undated) DEVICE — BLADE SAW RECIP 76MMX12.5MM

## (undated) DEVICE — ELECTRODE REM PLYHSV RETURN 9

## (undated) DEVICE — CLOSURE SKIN STERI STRIP 1/2X4

## (undated) DEVICE — HOOD T-5 TEAR AWAY STERILE

## (undated) DEVICE — NOZZLE BNE INJ CEM FEM POST 65

## (undated) DEVICE — INTERPULSE SET

## (undated) DEVICE — NDL 18GA X1 1/2 REG BEVEL

## (undated) DEVICE — ADHESIVE DERMABOND ADVANCED

## (undated) DEVICE — ADAPTER DUAL IRRIGATION

## (undated) DEVICE — INJECTION SODIUM CHLORIDE 50ML

## (undated) DEVICE — PIN PINABALL HEADLESS
Type: IMPLANTABLE DEVICE | Site: KNEE | Status: NON-FUNCTIONAL
Removed: 2024-01-29

## (undated) DEVICE — COVER OVERHEAD SURG LT BLUE

## (undated) DEVICE — COVER TABLE HVY DTY 60X90IN